# Patient Record
Sex: FEMALE | Race: WHITE | NOT HISPANIC OR LATINO | Employment: OTHER | URBAN - METROPOLITAN AREA
[De-identification: names, ages, dates, MRNs, and addresses within clinical notes are randomized per-mention and may not be internally consistent; named-entity substitution may affect disease eponyms.]

---

## 2017-06-08 ENCOUNTER — OFFICE VISIT (OUTPATIENT)
Dept: OPHTHALMOLOGY | Facility: CLINIC | Age: 62
End: 2017-06-08
Attending: OPHTHALMOLOGY
Payer: COMMERCIAL

## 2017-06-08 DIAGNOSIS — H16.9 KERATITIS: Primary | ICD-10-CM

## 2017-06-08 DIAGNOSIS — H02.59 FLOPPY EYELID SYNDROME: ICD-10-CM

## 2017-06-08 DIAGNOSIS — H04.129 DRY EYE: ICD-10-CM

## 2017-06-08 PROCEDURE — 99213 OFFICE O/P EST LOW 20 MIN: CPT | Mod: ZF

## 2017-06-08 ASSESSMENT — CONF VISUAL FIELD
OS_NORMAL: 1
OD_NORMAL: 1
METHOD: COUNTING FINGERS

## 2017-06-08 ASSESSMENT — VISUAL ACUITY
METHOD: SNELLEN - LINEAR
OD_PH_CC: 20/25
OS_CC: 20/25
OD_CC: 20/40
CORRECTION_TYPE: GLASSES

## 2017-06-08 ASSESSMENT — REFRACTION_WEARINGRX
OD_AXIS: 175
SPECS_TYPE: PAL
OS_ADD: +2.50
OS_AXIS: 046
OS_CYLINDER: +0.25
OD_CYLINDER: +0.25
OS_SPHERE: +6.25
OD_ADD: +2.50
OD_SPHERE: +6.50

## 2017-06-08 ASSESSMENT — TONOMETRY
IOP_METHOD: ICARE
OD_IOP_MMHG: 11
OS_IOP_MMHG: 14

## 2017-06-08 ASSESSMENT — EXTERNAL EXAM - RIGHT EYE: OD_EXAM: NO VESICULAR LESIONS

## 2017-06-08 ASSESSMENT — EXTERNAL EXAM - LEFT EYE: OS_EXAM: NO VESICULAR LESIONS

## 2017-06-08 NOTE — PROGRESS NOTES
Cc:   Chief Complaints and History of Present Illnesses   Patient presents with     Follow Up For     Keratitis Right eye     HPI: Heather Wilkinson is a 61 year old female who presents for 2 month followup of right eye keratitis. (see initial evaluation on 6/24/16).     Interval hx:   -vision stable  -dryness significantly improved.     POHx:  -contact lens wear   -? Herpetic keratitis OD (6/15/16)    Current Meds:   -acyclovir 400mg PO BID   -dexamethasone drops QOD OD  -erythromycin ointment HS OD  -PF AT PRN    Assessment & Plan   Heather Wilkinson is a 60 year old female with the following diagnoses:     1. Keratitis - Right Eye    2. Floppy eyelid syndrome         1. Keratitis - Right Eye  -likely herpetic keratitis previously  -likely worsened with other confounding issues:   -incomplete blink, floppy eyelids with exposure pattern OU, hx of cold sores with suspect viral keratitis recently and previously weekly CL use (>15 years)      Now resolved with Dry Eye Syndrome appearance and faint scar inferior    -has silicone plug RLL  -continue acyclovir 400mg PO BID for ppx, can consider trial off if patient desires  -dexamethasone 0.1% every other day   -continue PF AT QID and prn  -continue EES or Refresh PM at bedtime    2. Floppy eyelid syndrome  -as per exam with lids and corneal/conjunctival staining  -continue use of ointment at qHS with aggressive use of artificial tears through the day     RTC 6 months.     Kelvin Hannon MD  Ophthalmology resident, PGY-3        ~~~~~~~~~~~~~~~~~~~~~~~~~~~~~~~~~~~~~~~~~~~~~~~~~~~~~~~~~~~~~~~~    Complete documentation of historical and exam elements from today's encounter can be found in the full encounter summary report (not reduplicated in this progress note). I personally obtained the chief complaint(s) and history of present illness.  I confirmed and edited as necessary the review of systems, past medical/surgical history, family history, social history, and examination  findings as documented by others.  I examined the patient myself, and I personally reviewed the relevant tests, images, and reports as documented above. I formulated and edited as necessary the assessment and plan and discussed the findings and management plan with the patient and family.     Ld Burnett MD, MA  Director, Cornea & Anterior Segment  [facility] Department of Ophthalmology & Visual Neuroscience

## 2017-06-08 NOTE — NURSING NOTE
Chief Complaints and History of Present Illnesses   Patient presents with     Keratitis Follow Up     Keratitis      HPI    Affected eye(s):  Both   Symptoms:        Frequency:  Constant       Do you have eye pain now?:  No      Comments:  States va is the same since last visit.  But is noticing changes when it is dark  No red watery or dry  Gail Bell COT 1:29 PM June 8, 2017

## 2017-06-08 NOTE — MR AVS SNAPSHOT
After Visit Summary   6/8/2017    Heather Wilkinson    MRN: [id number]           Patient Information     Date Of Birth          1955        Visit Information        Provider Department      6/8/2017 1:15 PM Ld Burnett MD [facility]        Today's Diagnoses     Keratitis - Right Eye    -  1    Floppy eyelid syndrome - Both Eyes        Dry eye           Follow-ups after your visit        Follow-up notes from your care team     Return in about 6 months (around 12/8/2017) for general followup, cataract check.      Who to contact     Please call your clinic at [phone] to:    Ask questions about your health    Make or cancel appointments    Discuss your medicines    Learn about your test results    Speak to your doctor   If you have compliments or concerns about an experience at your clinic, or if you wish to file a complaint, please contact [facility] Patient Relations at [phone] or email us at [email or web address]         Additional Information About Your Visit        MyChart Information     Open[employer]St gives you secure access to your electronic health record. If you see a primary care provider, you can also send messages to your care team and make appointments. If you have questions, please call your primary care clinic.  If you do not have a primary care provider, please call [phone] and they will assist you.      [employer] is an electronic gateway that provides easy, online access to your medical records. With [employer], you can request a clinic appointment, read your test results, renew a prescription or communicate with your care team.     To access your existing account, please contact your [facility] or call [phone] for assistance.        Care EveryWhere ID     This is your Care EveryWhere ID. This could be used by other organizations to access your [place] medical records  [id number]         Blood  Pressure from Last 3 Encounters:   09/28/16 112/60   07/20/16 110/60   06/03/15 110/68    Weight from Last 3 Encounters:   09/28/16 70.3 kg (155 lb)   07/20/16 72.6 kg (160 lb)   06/03/15 71.2 kg (157 lb)              Today, you had the following     No orders found for display       Primary Care Provider Office Phone # Fax #    Shellie Montoya [phone]        [facility] [address] [place] [address] [address]  [facility] [address]        Thank you!     Thank you for choosing EYE CLINIC  for your care. Our goal is always to provide you with excellent care. Hearing back from our patients is one way we can continue to improve our services. Please take a few minutes to complete the written survey that you may receive in the mail after your visit with us. Thank you!             Your Updated Medication List - Protect others around you: Learn how to safely use, store and throw away your medicines at www.disposemymeds.org.          This list is accurate as of: 6/8/17 11:59 PM.  Always use your most recent med list.                   Brand Name Dispense Instructions for use    acyclovir 400 MG tablet    ZOVIRAX    60 tablet    Take 1 tablet (400 mg) by mouth 2 times daily       albuterol 108 (90 BASE) MCG/ACT Inhaler    PROAIR HFA/PROVENTIL HFA/VENTOLIN HFA     Inhale 2 puffs into the lungs every 6 hours.       ARTIFICIAL TEARS OP      Apply 1 drop to eye as needed Usually every 30 minutes.       budesonide-formoterol 160-4.5 MCG/ACT Inhaler    SYMBICORT     Inhale 2 puffs into the lungs 2 times daily       ciprofloxacin 500 MG tablet    CIPRO    14 tablet    Take 1 tablet (500 mg) by mouth 2 times daily       dexamethasone 0.1 % ophthalmic solution    DECADRON    5 mL    Place 1 drop into the right eye every other day       dexamethasone 0.1 % ophthalmic susp    DECADRON     Place 1 drop into the right eye 2 times daily       * erythromycin ophthalmic ointment    ROMYCIN     Place 1 Application into the right eye  At Bedtime       * erythromycin ophthalmic ointment    ROMYCIN    1 Tube    Place 0.25 inches into the right eye At Bedtime       phenazopyridine 100 MG tablet    PYRIDIUM    20 tablet    Take 2 tablets (200 mg) by mouth 3 times daily as needed       triamterene-hydrochlorothiazide 37.5-25 MG per capsule    DYAZIDE    180 capsule    Take 2 capsules by mouth daily       * Notice:  This list has 2 medication(s) that are the same as other medications prescribed for you. Read the directions carefully, and ask your doctor or other care provider to review them with you.

## 2017-07-12 DIAGNOSIS — R60.9 EDEMA, UNSPECIFIED TYPE: ICD-10-CM

## 2017-07-13 RX ORDER — TRIAMTERENE AND HYDROCHLOROTHIAZIDE 37.5; 25 MG/1; MG/1
CAPSULE ORAL
Qty: 180 CAPSULE | Refills: 0 | OUTPATIENT
Start: 2017-07-13

## 2017-07-13 NOTE — TELEPHONE ENCOUNTER
Pending Prescriptions:                       Disp   Refills    triamterene-hydrochlorothiazide (DYAZIDE) *180 ca*0        Sig: TAKE 2 CAPSULES BY MOUTH DAILY          Last Written Prescription Date: 7/12/17  Last Fill Quantity: 180, # refills: 0  Last Office Visit with G, [facility] or [facility] prescribing provider: 9/28/16 Problem Visit, 7/20/16 Annual    Next 5 appointments (look out 90 days)     Aug 03, 2017 11:00 AM CDT   PHYSICAL with James Hassan MD   [facility]    [address] [place] [address]   [phone]                   Potassium   Date Value Ref Range Status   05/30/2014 3.6 mmol/L Final     Creatinine   Date Value Ref Range Status   05/30/2014 0.74 mg/dL Final     BP Readings from Last 3 Encounters:   09/28/16 112/60   07/20/16 110/60   06/03/15 110/68

## 2017-10-07 DIAGNOSIS — R60.9 EDEMA, UNSPECIFIED TYPE: ICD-10-CM

## 2017-10-10 RX ORDER — TRIAMTERENE AND HYDROCHLOROTHIAZIDE 37.5; 25 MG/1; MG/1
CAPSULE ORAL
Qty: 180 CAPSULE | Refills: 0 | Status: SHIPPED | OUTPATIENT
Start: 2017-10-10 | End: 2017-10-19

## 2017-10-10 NOTE — TELEPHONE ENCOUNTER
DYAZIDE 37.5-25      Last Written Prescription Date:  7/12/17  Last Fill Quantity: 180,   # refills: 0  Last Office Visit with G primary care provider:  7/20/16-annual  Future Office visit: 10/19/17    Pt due for annual, appt scheduled,3 month supply sent for insurance purposes.

## 2017-10-12 ENCOUNTER — OFFICE VISIT (OUTPATIENT)
Dept: OPHTHALMOLOGY | Facility: CLINIC | Age: 62
End: 2017-10-12
Attending: OPHTHALMOLOGY
Payer: COMMERCIAL

## 2017-10-12 DIAGNOSIS — H02.59 FLOPPY EYELID SYNDROME: ICD-10-CM

## 2017-10-12 DIAGNOSIS — H16.9 KERATITIS: Primary | ICD-10-CM

## 2017-10-12 DIAGNOSIS — H04.129 DRY EYE: ICD-10-CM

## 2017-10-12 PROCEDURE — 99213 OFFICE O/P EST LOW 20 MIN: CPT | Mod: ZF

## 2017-10-12 RX ORDER — ACYCLOVIR 400 MG/1
400 TABLET ORAL 2 TIMES DAILY
Qty: 60 TABLET | Refills: 11 | Status: SHIPPED | OUTPATIENT
Start: 2017-10-12 | End: 2018-08-09

## 2017-10-12 ASSESSMENT — VISUAL ACUITY
OS_BAT_LOW: 20/30
OD_BAT_LOW: 20/20
OD_CC: 20/20
OD_BAT_MED: 20/25
METHOD: SNELLEN - LINEAR
OS_CC: 20/30
OD_BAT_HIGH: 20/25
OS_BAT_HIGH: 20/40
OS_CC+: -2
CORRECTION_TYPE: GLASSES
OS_BAT_MED: 20/30

## 2017-10-12 ASSESSMENT — REFRACTION_WEARINGRX
OD_SPHERE: +6.50
OS_CYLINDER: +0.25
SPECS_TYPE: PAL
OD_ADD: +2.50
OS_ADD: +2.50
OD_AXIS: 175
OS_AXIS: 046
OD_CYLINDER: +0.25
OS_SPHERE: +6.25

## 2017-10-12 ASSESSMENT — CONF VISUAL FIELD
OD_NORMAL: 1
METHOD: COUNTING FINGERS
OS_NORMAL: 1

## 2017-10-12 ASSESSMENT — EXTERNAL EXAM - LEFT EYE: OS_EXAM: NO VESICULAR LESIONS

## 2017-10-12 ASSESSMENT — EXTERNAL EXAM - RIGHT EYE: OD_EXAM: NO VESICULAR LESIONS

## 2017-10-12 ASSESSMENT — TONOMETRY
OS_IOP_MMHG: 13
OD_IOP_MMHG: 13
IOP_METHOD: TONOPEN

## 2017-10-12 NOTE — PROGRESS NOTES
Cc:   Chief Complaints and History of Present Illnesses   Patient presents with     Follow Up For     Keratitis Right eye     HPI: Heather Wilkinson is a 61 year old female who presents for 6 month followup of right eye keratitis. (see initial evaluation on 6/24/16).     Interval hx:   -vision stable  -reports difficulty with night driving, glare bothers her more   -denies haloes  -denies new flashes/floaters     POHx:  -contact lens wear   -Herpetic keratitis OD (6/15/16)    Current Meds:   -acyclovir 400mg PO BID   -dexamethasone drops every other day OD  -erythromycin ointment HS OD  -PF AT PRN    Assessment & Plan   Heather Wilkinson is a 60 year old female with the following diagnoses:     1. Keratitis - Right Eye    2. Floppy eyelid syndrome         1. Keratitis - Right Eye  -likely herpetic keratitis previously  -likely worsened with other confounding issues:   -incomplete blink, floppy eyelids with exposure pattern OU, hx of cold sores with suspect viral keratitis recently and previously weekly CL use (>15 years)      Now resolved with Dry Eye Syndrome appearance and faint scar inferior    -has silicone plug RLL  -continue acyclovir 400mg PO BID for ppx, can consider trial off if patient desires  -dexamethasone 0.1% every other day   -continue PF AT QID and prn  -continue EES or Refresh PM at bedtime    2. Floppy eyelid syndrome  -as per exam with lids and corneal/conjunctival staining  -continue use of ointment at qHS with aggressive use of artificial tears through the day     RTC 6 months.     Ld Boudreaux MD  Ophthalmology, PGY-3      ~~~~~~~~~~~~~~~~~~~~~~~~~~~~~~~~~~~~~~~~~~~~~~~~~~~~~~~~~~~~~~~~    Complete documentation of historical and exam elements from today's encounter can be found in the full encounter summary report (not reduplicated in this progress note). I personally obtained the chief complaint(s) and history of present illness.  I confirmed and edited as necessary the review of systems,  past medical/surgical history, family history, social history, and examination findings as documented by others.  I examined the patient myself, and I personally reviewed the relevant tests, images, and reports as documented above. I formulated and edited as necessary the assessment and plan and discussed the findings and management plan with the patient and family.     Ld Burnett MD, MA  Director, Cornea & Anterior Segment  [facility] Department of Ophthalmology & Visual Neuroscience

## 2017-10-12 NOTE — MR AVS SNAPSHOT
"              After Visit Summary   10/12/2017    Heather Wilkinson    MRN: [id number]           Patient Information     Date Of Birth          1955        Visit Information        Provider Department      10/12/2017 7:45 AM Ld Burnett MD [facility]        Today's Diagnoses     Keratitis - Right Eye    -  1    Floppy eyelid syndrome - Both Eyes        Dry eye           Follow-ups after your visit        Follow-up notes from your care team     Return in about 6 months (around 4/12/2018) for Follow Up.      Your next 10 appointments already scheduled     Oct 19, 2017 10:15 AM CDT   MA SCREENING DIGITAL BILATERAL with WEMA1   [facility] ([facility])    [address], [address]  [facility] [address] [phone]           Do not use any powder, lotion or deodorant under your arms or on your breast. If you do, we will ask you to remove it before your exam.  Wear comfortable, two-piece clothing.  If you have any allergies, tell your care team.  Bring any previous mammograms from other facilities or have them mailed to the breast center. Three-dimensional (3D) mammograms are available at [place] locations in [facility], [place], [place], [facility], [place], [place], and [place]. [facility] locations include [facility] in [place]. Benefits of 3D mammograms include: - Improved rate of cancer detection - Decreases your chance of having to go back for more tests, which means fewer: - \"False-positive\" results (This means that there is an abnormal area but it isn't cancer.) - Invasive testing procedures, such as a biopsy or surgery - Can provide clearer images of the breast if you have dense breast tissue. 3D mammography is an optional exam that anyone can have with a 2D mammogram. It doesn't replace or take the place of a 2D mammogram. 2D mammograms remain an effective screening test for all women.  Not all " insurance companies cover the cost of a 3D mammogram. Check with your insurance.            Oct 19, 2017 10:30 AM CDT   PHYSICAL with James Hassan MD   [facility] ([facility])    [address] [place] [id number] [phone]              Who to contact     Please call your clinic at [phone] to:    Ask questions about your health    Make or cancel appointments    Discuss your medicines    Learn about your test results    Speak to your doctor   If you have compliments or concerns about an experience at your clinic, or if you wish to file a complaint, please contact [facility] Patient Relations at [phone] or email us at [email or web address]         Additional Information About Your Visit        W[employer] Information     [email or web address] gives you secure access to your electronic health record. If you see a primary care provider, you can also send messages to your care team and make appointments. If you have questions, please call your primary care clinic.  If you do not have a primary care provider, please call [phone] and they will assist you.      [email or web address] is an electronic gateway that provides easy, online access to your medical records. With [email or web address], you can request a clinic appointment, read your test results, renew a prescription or communicate with your care team.     To access your existing account, please contact your [facility] or call [phone] for assistance.        Care EveryWhere ID     This is your Care EveryWhere ID. This could be used by other organizations to access your [place] medical records  [id number]         Blood Pressure from Last 3 Encounters:   09/28/16 112/60   07/20/16 110/60   06/03/15 110/68    Weight from Last 3 Encounters:   09/28/16 70.3 kg (155 lb)   07/20/16 72.6 kg (160 lb)   06/03/15 71.2 kg (157 lb)              Today, you had  the following     No orders found for display         Where to get your medicines      These medications were sent to [facility] - [place], [place] - [address] [facility] [address] [facility] [address] [facility] [address]  [address], [facility] [address]     Phone:  [phone]     acyclovir 400 MG tablet          Primary Care Provider Office Phone # Fax #    Shellie Montoya [phone]        [facility] [address] [place], [place] [address]  [facility] [address]        Equal Access to Services     [facility]: Hadii aad ku hadasho Soomaali, waaxda luqadaha, qaybta kaalmada adeegyada, waxay margiin hayaan tray miller . So [facility] [phone].    ATENCIÓN: Si habla español, tiene a marques disposición servicios gratuitos de asistencia lingüística. [facility] [phone].    We comply with applicable federal civil rights laws and [place] laws. We do not discriminate on the basis of race, color, national origin, age, disability, sex, sexual orientation, or gender identity.            Thank you!     Thank you for choosing [facility]  for your care. Our goal is always to provide you with excellent care. Hearing back from our patients is one way we can continue to improve our services. Please take a few minutes to complete the written survey that you may receive in the mail after your visit with us. Thank you!             Your Updated Medication List - Protect others around you: Learn how to safely use, store and throw away your medicines at www.disposemymeds.org.          This list is accurate as of: 10/12/17  8:43 AM.  Always use your most recent med list.                   Brand Name Dispense Instructions for use Diagnosis    acyclovir 400 MG tablet    ZOVIRAX    60 tablet    Take 1 tablet (400 mg) by mouth 2 times daily    Keratitis       albuterol 108 (90 BASE) MCG/ACT Inhaler    PROAIR HFA/PROVENTIL HFA/VENTOLIN HFA     Inhale 2 puffs into the lungs every 6 hours.        ARTIFICIAL TEARS OP      Apply 1  drop to eye as needed Usually every 30 minutes.        budesonide-formoterol 160-4.5 MCG/ACT Inhaler    SYMBICORT     Inhale 2 puffs into the lungs 2 times daily        ciprofloxacin 500 MG tablet    CIPRO    14 tablet    Take 1 tablet (500 mg) by mouth 2 times daily    Dysuria       dexamethasone 0.1 % ophthalmic solution    DECADRON    5 mL    Place 1 drop into the right eye every other day    Keratitis       dexamethasone 0.1 % ophthalmic susp    DECADRON     Place 1 drop into the right eye 2 times daily        * erythromycin ophthalmic ointment    ROMYCIN     Place 1 Application into the right eye At Bedtime        * erythromycin ophthalmic ointment    ROMYCIN    1 Tube    Place 0.25 inches into the right eye At Bedtime    Keratitis       phenazopyridine 100 MG tablet    PYRIDIUM    20 tablet    Take 2 tablets (200 mg) by mouth 3 times daily as needed    Dysuria       * triamterene-hydrochlorothiazide 37.5-25 MG per capsule    DYAZIDE    180 capsule    TAKE 2 CAPSULES BY MOUTH DAILY    Edema, unspecified type       * triamterene-hydrochlorothiazide 37.5-25 MG per capsule    DYAZIDE    180 capsule    TAKE 2 CAPSULES BY MOUTH EVERY DAY    Edema, unspecified type       * Notice:  This list has 4 medication(s) that are the same as other medications prescribed for you. Read the directions carefully, and ask your doctor or other care provider to review them with you.

## 2017-10-12 NOTE — NURSING NOTE
Chief Complaints and History of Present Illnesses   Patient presents with     Follow Up For     Keratitis      HPI    Affected eye(s):  Both   Symptoms:        Frequency:  Constant       Do you have eye pain now?:  No      Comments:  States va is the same since last visit  Night va has gotten worse.  No red watery or dry  Gail Bell COT 7:41 AM October 12, 2017

## 2017-10-19 ENCOUNTER — OFFICE VISIT (OUTPATIENT)
Dept: OBGYN | Facility: CLINIC | Age: 62
End: 2017-10-19
Payer: COMMERCIAL

## 2017-10-19 ENCOUNTER — RADIANT APPOINTMENT (OUTPATIENT)
Dept: MAMMOGRAPHY | Facility: CLINIC | Age: 62
End: 2017-10-19
Payer: COMMERCIAL

## 2017-10-19 VITALS
BODY MASS INDEX: 25.39 KG/M2 | DIASTOLIC BLOOD PRESSURE: 74 MMHG | SYSTOLIC BLOOD PRESSURE: 122 MMHG | WEIGHT: 158 LBS | HEIGHT: 66 IN

## 2017-10-19 DIAGNOSIS — R60.9 EDEMA, UNSPECIFIED TYPE: ICD-10-CM

## 2017-10-19 DIAGNOSIS — Z01.419 ENCOUNTER FOR GYNECOLOGICAL EXAMINATION WITHOUT ABNORMAL FINDING: Primary | ICD-10-CM

## 2017-10-19 DIAGNOSIS — Z12.31 VISIT FOR SCREENING MAMMOGRAM: ICD-10-CM

## 2017-10-19 PROCEDURE — 99396 PREV VISIT EST AGE 40-64: CPT | Performed by: OBSTETRICS & GYNECOLOGY

## 2017-10-19 PROCEDURE — G0145 SCR C/V CYTO,THINLAYER,RESCR: HCPCS | Performed by: OBSTETRICS & GYNECOLOGY

## 2017-10-19 PROCEDURE — G0202 SCR MAMMO BI INCL CAD: HCPCS | Mod: TC

## 2017-10-19 PROCEDURE — 77063 BREAST TOMOSYNTHESIS BI: CPT | Mod: TC

## 2017-10-19 PROCEDURE — 87624 HPV HI-RISK TYP POOLED RSLT: CPT | Performed by: OBSTETRICS & GYNECOLOGY

## 2017-10-19 RX ORDER — TRIAMTERENE AND HYDROCHLOROTHIAZIDE 37.5; 25 MG/1; MG/1
CAPSULE ORAL
Qty: 180 CAPSULE | Refills: 3 | Status: SHIPPED | OUTPATIENT
Start: 2017-10-19 | End: 2019-01-06

## 2017-10-19 ASSESSMENT — ANXIETY QUESTIONNAIRES
7. FEELING AFRAID AS IF SOMETHING AWFUL MIGHT HAPPEN: NOT AT ALL
2. NOT BEING ABLE TO STOP OR CONTROL WORRYING: NOT AT ALL
6. BECOMING EASILY ANNOYED OR IRRITABLE: NOT AT ALL
GAD7 TOTAL SCORE: 0
5. BEING SO RESTLESS THAT IT IS HARD TO SIT STILL: NOT AT ALL
IF YOU CHECKED OFF ANY PROBLEMS ON THIS QUESTIONNAIRE, HOW DIFFICULT HAVE THESE PROBLEMS MADE IT FOR YOU TO DO YOUR WORK, TAKE CARE OF THINGS AT HOME, OR GET ALONG WITH OTHER PEOPLE: NOT DIFFICULT AT ALL
3. WORRYING TOO MUCH ABOUT DIFFERENT THINGS: NOT AT ALL
1. FEELING NERVOUS, ANXIOUS, OR ON EDGE: NOT AT ALL

## 2017-10-19 ASSESSMENT — PATIENT HEALTH QUESTIONNAIRE - PHQ9
5. POOR APPETITE OR OVEREATING: NOT AT ALL
SUM OF ALL RESPONSES TO PHQ QUESTIONS 1-9: 0

## 2017-10-19 NOTE — PROGRESS NOTES
Heather is a 61 year old No obstetric history on file. female who presents for annual exam.     Besides routine health maintenance, she has no other health concerns today .    HPI:  The patient's PCP is Shellie Montoya MD.    Gynecologic exam.  She has no complaints.  She needs a refill on her triamterene.  She is up-to-date on her mammogram and colonoscopy.      GYNECOLOGIC HISTORY:    No LMP recorded. Patient has had a hysterectomy.  Her current contraception method is: menopause.  She  reports that she has been smoking.  She has never used smokeless tobacco.      Patient is sexually active.  STD testing offered?  Declined  Last PHQ-9 score on record =   PHQ-9 SCORE 10/19/2017   Total Score 0     Last GAD7 score on record =   ERICA-7 SCORE 10/19/2017   Total Score 0     Alcohol Score = 3    HEALTH MAINTENANCE:  Cholesterol:   Cholesterol   Date Value Ref Range Status   2016 223 (H) <200 mg/dL Final     Comment:     Desirable:       <200 mg/dl   2014 219 mg/dL Final    16   Total= 223, Triglycerides=134, HDL=60, UKV=016    Last Mammo: one year ago, Result: normal, Next Mammo: today   Pap:   Lab Results   Component Value Date    PAP NIL 2016    PAP NIL 2015 WNL   Colonoscopy:  2 years ago, Result: normal, Next Colonoscopy: 3 years.  Dexa:  2014    Health maintenance updated:  yes    HISTORY:  Obstetric History       T4      L4     SAB0   TAB0   Ectopic0   Multiple0   Live Births4       # Outcome Date GA Lbr Giuseppe/2nd Weight Sex Delivery Anes PTL Lv   4 Term            3 Term            2 Term            1 Term                   There is no problem list on file for this patient.    Past Surgical History:   Procedure Laterality Date     CYSTOSCOPY  2012    Procedure:CYSTOSCOPY; Surgeon:RADHA COMER; Location:[facility]     GYN SURGERY      csection, infertility, hyst     HYSTERECTOMY  1992     SLING TRANSVAGINAL  2012    Procedure:SLING  TRANSVAGINAL; mini arc sling  and cystoscopy; Surgeon:RADHA COMER; [facility]      Social History   Substance Use Topics     Smoking status: Current Some Day Smoker     Smokeless tobacco: Never Used     Alcohol use Yes         Negative family history of: Glaucoma, Macular Degeneration            Current Outpatient Prescriptions   Medication Sig     triamterene-hydrochlorothiazide (DYAZIDE) 37.5-25 MG per capsule TAKE 2 CAPSULES BY MOUTH EVERY DAY     acyclovir (ZOVIRAX) 400 MG tablet Take 1 tablet (400 mg) by mouth 2 times daily     triamterene-hydrochlorothiazide (DYAZIDE) 37.5-25 MG per capsule TAKE 2 CAPSULES BY MOUTH DAILY     ciprofloxacin (CIPRO) 500 MG tablet Take 1 tablet (500 mg) by mouth 2 times daily     dexamethasone (DECADRON) 0.1 % ophthalmic susp Place 1 drop into the right eye 2 times daily     erythromycin (ROMYCIN) ophthalmic ointment Place 1 Application into the right eye At Bedtime     dexamethasone (DECADRON) 0.1 % ophthalmic solution Place 1 drop into the right eye every other day     erythromycin (ROMYCIN) ophthalmic ointment Place 0.25 inches into the right eye At Bedtime     phenazopyridine (PYRIDIUM) 100 MG tablet Take 2 tablets (200 mg) by mouth 3 times daily as needed     Hypromellose (ARTIFICIAL TEARS OP) Apply 1 drop to eye as needed Usually every 30 minutes.     budesonide-formoterol (SYMBICORT) 160-4.5 MCG/ACT inhaler Inhale 2 puffs into the lungs 2 times daily     albuterol (PROVENTIL HFA: VENTOLIN HFA) 108 (90 BASE) MCG/ACT inhaler Inhale 2 puffs into the lungs every 6 hours.     [DISCONTINUED] triamterene-hydrochlorothiazide (DYAZIDE) 37.5-25 MG per capsule TAKE 2 CAPSULES BY MOUTH EVERY DAY     No current facility-administered medications for this visit.      Allergies   Allergen Reactions     Latex Anaphylaxis     Penicillins      Childhood rash     Sulfa Drugs        Past medical, surgical, social and family histories were reviewed and updated in [facility].    ROS:  "  Musculoskeletal: Joint Pain    EXAM:  /74  Ht 5' 5.75\" (1.67 m)  Wt 158 lb (71.7 kg)  Breastfeeding? No  BMI 25.7 kg/m2   BMI: Body mass index is 25.7 kg/(m^2).    PHYSICAL EXAM:  Constitutional:  Appearance: Well nourished, well developed, alert, in no acute distress  Neck:  Lymph Nodes:  No lymphadenopathy present    Thyroid:  Gland size normal, nontender, no nodules or masses present  on palpation  Chest:  Respiratory Effort:  Breathing unlabored  Cardiovascular:    Heart: Auscultation:  Regular rate, normal rhythm, no murmurs present  Breasts: Inspection of Breasts:  No lymphadenopathy present., Palpation of Breasts and Axillae:  No masses present on palpation, no breast tenderness., Axillary Lymph Nodes:  No lymphadenopathy present. and No nodularity, asymmetry or nipple discharge bilaterally.  Gastrointestinal:   Abdominal Examination:  Abdomen nontender to palpation, tone normal without rigidity or guarding, no masses present, umbilicus without lesions   Liver and Spleen:  No hepatomegaly present, liver nontender to palpation    Hernias:  No hernias present  Lymphatic: Lymph Nodes:  No other lymphadenopathy present  Skin:  General Inspection:  No rashes present, no lesions present, no areas of  discoloration    Genitalia and Groin:  No rashes present, no lesions present, no areas of  discoloration, no masses present  Neurologic/Psychiatric:    Mental Status:  Oriented X3     Pelvic Exam:  External Genitalia:     Normal appearance for age, no discharge present, no tenderness present, no inflammatory lesions present, color normal  Vagina:     Normal vaginal vault without central or paravaginal defects, no discharge present, no inflammatory lesions present, no masses present  Bladder:     Nontender to palpation  Urethra:   Urethral Body:  Urethra palpation normal, urethra structural support normal   Urethral Meatus:  No erythema or lesions present  Cervix:     Surgically absent  Uterus:  "    Surgically absent  Adnexa:     Surgically absent  Perineum:     Perineum within normal limits, no evidence of trauma, no rashes or skin lesions present  Anus:     Anus within normal limits, no hemorrhoids present  Inguinal Lymph Nodes:     No lymphadenopathy present      COUNSELING:   Reviewed preventive health counseling, as reflected in patient instructions       Regular exercise       Healthy diet/nutrition    BMI: Body mass index is 25.7 kg/(m^2).      ASSESSMENT:  61 year old female with satisfactory annual exam.    ICD-10-CM    1. Encounter for gynecological examination without abnormal finding Z01.419 Pap imaged thin layer screen with HPV - recommended age 30 - 65     HPV High Risk Types DNA Cervical   2. Edema, unspecified type R60.9 triamterene-hydrochlorothiazide (DYAZIDE) 37.5-25 MG per capsule       PLAN:  Return to clinic as needed or one year.    James Hassan MD

## 2017-10-19 NOTE — MR AVS SNAPSHOT
"              After Visit Summary   10/19/2017    Heather Wilkinson    MRN: [id number]           Patient Information     Date Of Birth          1955        Visit Information        Provider Department      10/19/2017 10:30 AM James Hassan MD [facility]        Today's Diagnoses     Encounter for gynecological examination without abnormal finding    -  1    Edema, unspecified type           Follow-ups after your visit        Who to contact     If you have questions or need follow up information about today's clinic visit or your schedule please contact [facility] directly at [phone].  Normal or non-critical lab and imaging results will be communicated to you by MyChart, letter or phone within 4 business days after the clinic has received the results. If you do not hear from us within 7 days, please contact the clinic through Preisbockhart or phone. If you have a critical or abnormal lab result, we will notify you by phone as soon as possible.  Submit refill requests through GPal or call your pharmacy and they will forward the refill request to us. Please allow 3 business days for your refill to be completed.          Additional Information About Your Visit        MyChart Information     GPal gives you secure access to your electronic health record. If you see a primary care provider, you can also send messages to your care team and make appointments. If you have questions, please call your primary care clinic.  If you do not have a primary care provider, please call [phone] and they will assist you.        Care EveryWhere ID     This is your Care EveryWhere ID. This could be used by other organizations to access your [place] medical records  [id number]        Your Vitals Were     Height Breastfeeding? BMI (Body Mass Index)             5' 5.75\" (1.67 m) No 25.7 kg/m2          Blood Pressure from Last 3 Encounters:   10/19/17 122/74   09/28/16 112/60 "   07/20/16 110/60    Weight from Last 3 Encounters:   10/19/17 158 lb (71.7 kg)   09/28/16 155 lb (70.3 kg)   07/20/16 160 lb (72.6 kg)              We Performed the Following     HPV High Risk Types DNA Cervical     Pap imaged thin layer screen with HPV - recommended age 30 - 65          Today's Medication Changes          These changes are accurate as of: 10/19/17 10:49 AM.  If you have any questions, ask your nurse or doctor.               These medicines have changed or have updated prescriptions.        Dose/Directions    * triamterene-hydrochlorothiazide 37.5-25 MG per capsule   Commonly known as:  DYAZIDE   This may have changed:  Another medication with the same name was changed. Make sure you understand how and when to take each.   Used for:  Edema, unspecified type   Changed by:  James Hassan MD        TAKE 2 CAPSULES BY MOUTH DAILY   Quantity:  180 capsule   Refills:  0       * triamterene-hydrochlorothiazide 37.5-25 MG per capsule   Commonly known as:  DYAZIDE   This may have changed:  See the new instructions.   Used for:  Edema, unspecified type   Changed by:  James Hassan MD        TAKE 2 CAPSULES BY MOUTH EVERY DAY   Quantity:  180 capsule   Refills:  3       * Notice:  This list has 2 medication(s) that are the same as other medications prescribed for you. Read the directions carefully, and ask your doctor or other care provider to review them with you.         Where to get your medicines      These medications were sent to [facility] [address] [place] [address] [address] [facility] AT [facility] [address]  [address] , [facility] [address]     Phone:  [phone]     triamterene-hydrochlorothiazide 37.5-25 MG per capsule                Primary Care Provider Office Phone # Fax #    Shellie Montoya [phone]        [facility] [address] [place] [address] [address]  [facility] [address]        Equal Access to Services     FELA BOND AH: Glenda selby  bandar Kraft, walaureda lualishaadaha, qaybta kapat tijerina, tamia potts alejandrabrock huytrena lanickcandy mike. So [facility] [phone].    ATENCIÓN: Si khloe allan, tiene a marques disposición servicios gratuitos de asistencia lingüística. Christa al [phone].    We comply with applicable federal civil rights laws and [place] laws. We do not discriminate on the basis of race, color, national origin, age, disability, sex, sexual orientation, or gender identity.            Thank you!     Thank you for choosing [facility]  for your care. Our goal is always to provide you with excellent care. Hearing back from our patients is one way we can continue to improve our services. Please take a few minutes to complete the written survey that you may receive in the mail after your visit with us. Thank you!             Your Updated Medication List - Protect others around you: Learn how to safely use, store and throw away your medicines at www.disposemymeds.org.          This list is accurate as of: 10/19/17 10:49 AM.  Always use your most recent med list.                   Brand Name Dispense Instructions for use Diagnosis    acyclovir 400 MG tablet    ZOVIRAX    60 tablet    Take 1 tablet (400 mg) by mouth 2 times daily    Keratitis       albuterol 108 (90 BASE) MCG/ACT Inhaler    PROAIR HFA/PROVENTIL HFA/VENTOLIN HFA     Inhale 2 puffs into the lungs every 6 hours.        ARTIFICIAL TEARS OP      Apply 1 drop to eye as needed Usually every 30 minutes.        budesonide-formoterol 160-4.5 MCG/ACT Inhaler    SYMBICORT     Inhale 2 puffs into the lungs 2 times daily        ciprofloxacin 500 MG tablet    CIPRO    14 tablet    Take 1 tablet (500 mg) by mouth 2 times daily    Dysuria       dexamethasone 0.1 % ophthalmic solution    DECADRON    5 mL    Place 1 drop into the right eye every other day    Keratitis       dexamethasone 0.1 % ophthalmic susp    DECADRON     Place 1 drop into the right eye 2 times daily         * erythromycin ophthalmic ointment    ROMYCIN     Place 1 Application into the right eye At Bedtime        * erythromycin ophthalmic ointment    ROMYCIN    1 Tube    Place 0.25 inches into the right eye At Bedtime    Keratitis       phenazopyridine 100 MG tablet    PYRIDIUM    20 tablet    Take 2 tablets (200 mg) by mouth 3 times daily as needed    Dysuria       * triamterene-hydrochlorothiazide 37.5-25 MG per capsule    DYAZIDE    180 capsule    TAKE 2 CAPSULES BY MOUTH DAILY    Edema, unspecified type       * triamterene-hydrochlorothiazide 37.5-25 MG per capsule    DYAZIDE    180 capsule    TAKE 2 CAPSULES BY MOUTH EVERY DAY    Edema, unspecified type       * Notice:  This list has 4 medication(s) that are the same as other medications prescribed for you. Read the directions carefully, and ask your doctor or other care provider to review them with you.

## 2017-10-20 ASSESSMENT — ANXIETY QUESTIONNAIRES: GAD7 TOTAL SCORE: 0

## 2017-10-23 LAB
COPATH REPORT: NORMAL
PAP: NORMAL

## 2017-10-25 LAB
FINAL DIAGNOSIS: NORMAL
HPV HR 12 DNA CVX QL NAA+PROBE: NEGATIVE
HPV16 DNA SPEC QL NAA+PROBE: NEGATIVE
HPV18 DNA SPEC QL NAA+PROBE: NEGATIVE
SPECIMEN DESCRIPTION: NORMAL

## 2018-01-04 DIAGNOSIS — H16.9 KERATITIS: ICD-10-CM

## 2018-01-05 RX ORDER — DEXAMETHASONE SODIUM PHOSPHATE 1 MG/ML
1 SOLUTION/ DROPS OPHTHALMIC EVERY OTHER DAY
Qty: 5 ML | Refills: 6 | Status: SHIPPED | OUTPATIENT
Start: 2018-01-05 | End: 2018-08-09

## 2018-01-05 RX ORDER — ERYTHROMYCIN 5 MG/G
0.25 OINTMENT OPHTHALMIC AT BEDTIME
Qty: 1 TUBE | Refills: 11 | Status: SHIPPED | OUTPATIENT
Start: 2018-01-05 | End: 2018-08-09

## 2018-01-05 NOTE — TELEPHONE ENCOUNTER
dexamethasone (DECADRON)   Last Written Prescription Date:  12/8/16  Last Fill Quantity: 5ml,   # refills: 6  Last Office Visit : 10/12/17  Future Office visit:  None     erythromycin (ROMYCIN) ophthalmic ointment   Last Written Prescription Date:  12/8/16  Last Fill Quantity: 1 tube,   # refills: 11      Office Visit     10/12/2017  [facility]    Page, Ld Bartlett MD   Ophthalmology    Keratitis - Right Eye +2 more   Dx    Follow Up For ; Referred by Shellie Montoya MD   Reason for visit    Progress Notes      Cc:        Chief Complaints and History of Present Illnesses   Patient presents with     Follow Up For       Keratitis Right eye      HPI: Heather Wilkinson is a 61 year old female who presents for 6 month followup of right eye keratitis. (see initial evaluation on 6/24/16).      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 Keratitis - Right Eye    2. Floppy eyelid syndrome           1. Keratitis - Right Eye  -likely herpetic keratitis previously  -likely worsened with other confounding issues:   -incomplete blink, floppy eyelids with exposure pattern OU, hx of cold sores with suspect viral keratitis recently and previously weekly CL use (>15 years)       Now resolved with Dry Eye Syndrome appearance and faint scar inferior     -has silicone plug RLL  -continue acyclovir 400mg PO BID for ppx, can consider trial off if patient desires  -dexamethasone 0.1% every other day   -continue PF AT QID and prn  -continue EES or Refresh PM at bedtime     2. Floppy eyelid syndrome  -as per exam with lids and corneal/conjunctival  staining  -continue use of ointment at qHS with aggressive use of artificial tears through the day      RTC 6 months.      Ld Boudreaux MD  Ophthalmology, PGY-3              Routing refill request to provider for review/approval because: protocol.

## 2018-08-09 ENCOUNTER — OFFICE VISIT (OUTPATIENT)
Dept: OPHTHALMOLOGY | Facility: CLINIC | Age: 63
End: 2018-08-09
Attending: OPHTHALMOLOGY
Payer: COMMERCIAL

## 2018-08-09 DIAGNOSIS — H02.59 FLOPPY EYELID SYNDROME: Primary | ICD-10-CM

## 2018-08-09 DIAGNOSIS — H04.129 DRY EYE: ICD-10-CM

## 2018-08-09 DIAGNOSIS — H16.9 KERATITIS: ICD-10-CM

## 2018-08-09 PROCEDURE — G0463 HOSPITAL OUTPT CLINIC VISIT: HCPCS | Mod: ZF

## 2018-08-09 RX ORDER — NYSTATIN 100000/ML
SUSPENSION, ORAL (FINAL DOSE FORM) ORAL
COMMUNITY
Start: 2018-06-25 | End: 2021-02-16

## 2018-08-09 RX ORDER — ERYTHROMYCIN 5 MG/G
0.25 OINTMENT OPHTHALMIC AT BEDTIME
Qty: 1 TUBE | Refills: 11 | Status: SHIPPED | OUTPATIENT
Start: 2018-08-09 | End: 2019-09-07

## 2018-08-09 RX ORDER — ASPIRIN 325 MG
325 TABLET ORAL
COMMUNITY
End: 2019-11-21

## 2018-08-09 RX ORDER — DEXAMETHASONE SODIUM PHOSPHATE 1 MG/ML
1 SOLUTION/ DROPS OPHTHALMIC EVERY OTHER DAY
Qty: 5 ML | Refills: 6 | Status: SHIPPED | OUTPATIENT
Start: 2018-08-09 | End: 2019-01-14

## 2018-08-09 RX ORDER — ACYCLOVIR 400 MG/1
400 TABLET ORAL 2 TIMES DAILY
Qty: 60 TABLET | Refills: 11 | Status: SHIPPED | OUTPATIENT
Start: 2018-08-09 | End: 2019-08-08

## 2018-08-09 RX ORDER — ACYCLOVIR 400 MG/1
TABLET ORAL
COMMUNITY
Start: 2016-12-08 | End: 2019-12-06

## 2018-08-09 RX ORDER — ALBUTEROL SULFATE 90 UG/1
2 AEROSOL, METERED RESPIRATORY (INHALATION)
COMMUNITY
Start: 2017-09-27 | End: 2018-10-24

## 2018-08-09 RX ORDER — DEXAMETHASONE SODIUM PHOSPHATE 1 MG/ML
1 SOLUTION/ DROPS OPHTHALMIC
COMMUNITY
End: 2019-11-21

## 2018-08-09 RX ORDER — METOPROLOL TARTRATE 25 MG/1
TABLET, FILM COATED ORAL
COMMUNITY
Start: 2018-08-02

## 2018-08-09 ASSESSMENT — VISUAL ACUITY
CORRECTION_TYPE: GLASSES
OS_CC: 20/20
METHOD: SNELLEN - LINEAR
OD_CC: 20/20

## 2018-08-09 ASSESSMENT — REFRACTION_WEARINGRX
OD_CYLINDER: +0.25
OS_ADD: +2.50
OD_SPHERE: +6.50
OD_AXIS: 175
OS_AXIS: 046
SPECS_TYPE: PAL
OD_ADD: +2.50
OS_SPHERE: +6.25
OS_CYLINDER: +0.25

## 2018-08-09 ASSESSMENT — CUP TO DISC RATIO
OD_RATIO: 0.3
OS_RATIO: 0.3

## 2018-08-09 ASSESSMENT — CONF VISUAL FIELD
OD_NORMAL: 1
OS_NORMAL: 1

## 2018-08-09 ASSESSMENT — TONOMETRY
OS_IOP_MMHG: 10
IOP_METHOD: ICARE
OD_IOP_MMHG: 10

## 2018-08-09 ASSESSMENT — EXTERNAL EXAM - LEFT EYE: OS_EXAM: NO VESICULAR LESIONS

## 2018-08-09 ASSESSMENT — EXTERNAL EXAM - RIGHT EYE: OD_EXAM: NO VESICULAR LESIONS

## 2018-08-09 NOTE — MR AVS SNAPSHOT
After Visit Summary   8/9/2018    Heather Wilkinson    MRN: [id number]           Patient Information     Date Of Birth          1955        Visit Information        Provider Department      8/9/2018 9:15 AM Ld Burnett MD [facility]        Today's Diagnoses     Floppy eyelid syndrome - Both Eyes    -  1    Keratitis - Right Eye        Dry eye           Follow-ups after your visit        Follow-up notes from your care team     Return in about 6 months (around 2/9/2019) for Follow Up.      Who to contact     Please call your clinic at [phone] to:    Ask questions about your health    Make or cancel appointments    Discuss your medicines    Learn about your test results    Speak to your doctor            Additional Information About Your Visit        Xoomsyshart Information     wildcraft gives you secure access to your electronic health record. If you see a primary care provider, you can also send messages to your care team and make appointments. If you have questions, please call your primary care clinic.  If you do not have a primary care provider, please call [phone] and they will assist you.      wildcraft is an electronic gateway that provides easy, online access to your medical records. With wildcraft, you can request a clinic appointment, read your test results, renew a prescription or communicate with your care team.     To access your existing account, please contact your [facility] or call [phone] for assistance.        Care EveryWhere ID     This is your Care EveryWhere ID. This could be used by other organizations to access your [place] medical records  [id number]         Blood Pressure from Last 3 Encounters:   10/19/17 122/74   09/28/16 112/60   07/20/16 110/60    Weight from Last 3 Encounters:   10/19/17 71.7 kg (158 lb)   09/28/16 70.3 kg (155 lb)   07/20/16 72.6 kg (160 lb)              Today, you had the following     No orders found  for display         Today's Medication Changes          These changes are accurate as of 8/9/18 11:59 PM.  If you have any questions, ask your nurse or doctor.               Stop taking these medicines if you haven't already. Please contact your care team if you have questions.     ciprofloxacin 500 MG tablet   Commonly known as:  CIPRO   Stopped by:  dL Burnett MD                Where to get your medicines      These medications were sent to [facility] [id number] [place] [address] [facility] OF [address]  [address] [facility], [facility] [address]     Phone:  [phone]     acyclovir 400 MG tablet    dexamethasone 0.1 % ophthalmic solution    erythromycin ophthalmic ointment                Primary Care Provider Office Phone # Fax #    Shellie Montoya [phone]        [facility] [address] [place], [place] [address]  [facility] [address]        Equal Access to Services     CHASITY BOND : Hadii aad ku hadasho Soomaali, waaxda luqadaha, qaybta kaalmada adeegyada, waxay idiin hayaan tray miller . So [facility] [phone].    ATENCIÓN: Si habla español, tiene a marques disposición servicios gratuitos de asistencia lingüística. Christa al [phone].    We comply with applicable federal civil rights laws and [place] laws. We do not discriminate on the basis of race, color, national origin, age, disability, sex, sexual orientation, or gender identity.            Thank you!     Thank you for choosing [facility]  for your care. Our goal is always to provide you with excellent care. Hearing back from our patients is one way we can continue to improve our services. Please take a few minutes to complete the written survey that you may receive in the mail after your visit with us. Thank you!             Your Updated Medication List - Protect others around you: Learn how to safely use, store and throw away your medicines at www.disposemymeds.org.          This list is accurate  as of 8/9/18 11:59 PM.  Always use your most recent med list.                   Brand Name Dispense Instructions for use Diagnosis    * acyclovir 400 MG tablet    ZOVIRAX     Take 1 tablet (400 mg) by mouth 2 times daily        * acyclovir 400 MG tablet    ZOVIRAX    60 tablet    Take 1 tablet (400 mg) by mouth 2 times daily    Keratitis       * albuterol 108 (90 Base) MCG/ACT inhaler    PROAIR HFA/PROVENTIL HFA/VENTOLIN HFA     Inhale 2 puffs into the lungs every 6 hours.        * albuterol 108 (90 Base) MCG/ACT inhaler    PROAIR HFA/PROVENTIL HFA/VENTOLIN HFA     Inhale 2 puffs into the lungs        ARTIFICIAL TEARS OP      Apply 1 drop to eye as needed Usually every 30 minutes.        aspirin 325 MG tablet      Take 325 mg by mouth        budesonide-formoterol 160-4.5 MCG/ACT Inhaler    SYMBICORT     Inhale 2 puffs into the lungs 2 times daily        * dexamethasone 0.1 % ophthalmic solution    DECADRON     1 drop        * dexamethasone 0.1 % ophthalmic solution    DECADRON    5 mL    Place 1 drop into the right eye every other day    Keratitis       dexamethasone 0.1 % ophthalmic susp    DECADRON     Place 1 drop into the right eye 2 times daily        * erythromycin ophthalmic ointment    ROMYCIN     Place 1 Application into the right eye At Bedtime        * erythromycin ophthalmic ointment    ROMYCIN    1 Tube    Place 0.25 inches into the right eye At Bedtime    Keratitis       metoprolol tartrate 25 MG tablet    LOPRESSOR          nystatin 666220 UNIT/ML suspension    MYCOSTATIN     TAKE 5MLS BY MOUTH FOUR TIMES DAILY        phenazopyridine 100 MG tablet    PYRIDIUM    20 tablet    Take 2 tablets (200 mg) by mouth 3 times daily as needed    Dysuria       ranitidine 150 MG tablet    ZANTAC     TAKE 1 TABLET(150 MG) BY MOUTH AT BEDTIME        * triamterene-hydrochlorothiazide 37.5-25 MG per capsule    DYAZIDE    180 capsule    TAKE 2 CAPSULES BY MOUTH DAILY    Edema, unspecified type       *  triamterene-hydrochlorothiazide 37.5-25 MG per capsule    DYAZIDE    180 capsule    TAKE 2 CAPSULES BY MOUTH EVERY DAY    Edema, unspecified type       * Notice:  This list has 10 medication(s) that are the same as other medications prescribed for you. Read the directions carefully, and ask your doctor or other care provider to review them with you.

## 2018-08-09 NOTE — PROGRESS NOTES
HPI: Heather Wilkinson is a 61 year old female who presents for 6 month followup of right eye keratitis. (see initial evaluation on 6/24/16).     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 Keratitis - Right Eye  -likely herpetic keratitis previously  -likely worsened with other confounding issues:  -incomplete blink, floppy eyelids with exposure pattern OU, hx of cold sores with suspect viral keratitis recently and previously weekly CL use (>15 years)      Now resolved with Dry Eye Syndrome appearance and faint scar inferior     -silicone plug in place RIGHT LOWER LID    -continue acyclovir 400 mg PO BID for ppx, can consider trial off if patient desires  -continue dexamethasone 0.1% every other day    -continue PF AT QID and as needed   -continue EES or Refresh PM at bedtime  -continue warm compress       2. Floppy eyelid syndrome  -as per exam with lids and corneal/conjunctival staining  -continue use of ointment at qHS with aggressive use of artificial tears through the day     return to clinic: 6 months       Minh Rock MD  Ophthalmology Resident, PGY-3  [facility]        ~~~~~~~~~~~~~~~~~~~~~~~~~~~~~~~~~~~~~~~~~~~~~~~~~~~~~~~~~~~~~~~~    Complete documentation of historical and exam elements from today's encounter can be found in the full encounter summary report (not reduplicated in this progress note). I personally obtained the chief complaint(s) and history of present illness.  I confirmed and edited as necessary the review of systems, past medical/surgical history, family history, social history, and examination findings as documented by others.  I examined the patient myself, and I personally reviewed  the relevant tests, images, and reports as documented above. I formulated and edited as necessary the assessment and plan and discussed the findings and management plan with the patient and family.     Ld Burnett MD, MA  Director, Cornea & Anterior Segment  [facility] Department of Ophthalmology & Visual Neuroscience

## 2018-08-09 NOTE — NURSING NOTE
Chief Complaints and History of Present Illnesses   Patient presents with     Follow Up For     Keratitis - Right Eye     HPI    Affected eye(s):  Both   Symptoms:        Duration:  6 months   Frequency:  Constant       Do you have eye pain now?:  No      Comments:  Pt. States that she is doing well.  No change in VA BE.  No c/o comfort BE.  Wendy Ortiz COT 9:23 AM August 9, 2018

## 2018-10-24 ENCOUNTER — OFFICE VISIT (OUTPATIENT)
Dept: OBGYN | Facility: CLINIC | Age: 63
End: 2018-10-24
Payer: COMMERCIAL

## 2018-10-24 ENCOUNTER — RADIANT APPOINTMENT (OUTPATIENT)
Dept: MAMMOGRAPHY | Facility: CLINIC | Age: 63
End: 2018-10-24
Payer: COMMERCIAL

## 2018-10-24 VITALS
BODY MASS INDEX: 26.26 KG/M2 | WEIGHT: 163.4 LBS | HEIGHT: 66 IN | HEART RATE: 80 BPM | SYSTOLIC BLOOD PRESSURE: 106 MMHG | DIASTOLIC BLOOD PRESSURE: 70 MMHG

## 2018-10-24 DIAGNOSIS — R30.0 DYSURIA: ICD-10-CM

## 2018-10-24 DIAGNOSIS — Z01.419 ENCOUNTER FOR GYNECOLOGICAL EXAMINATION WITHOUT ABNORMAL FINDING: Primary | ICD-10-CM

## 2018-10-24 DIAGNOSIS — R10.10 PAIN OF UPPER ABDOMEN: ICD-10-CM

## 2018-10-24 DIAGNOSIS — R60.9 EDEMA, UNSPECIFIED TYPE: ICD-10-CM

## 2018-10-24 DIAGNOSIS — Z12.31 VISIT FOR SCREENING MAMMOGRAM: ICD-10-CM

## 2018-10-24 PROCEDURE — 99396 PREV VISIT EST AGE 40-64: CPT | Performed by: OBSTETRICS & GYNECOLOGY

## 2018-10-24 PROCEDURE — 77067 SCR MAMMO BI INCL CAD: CPT | Mod: TC

## 2018-10-24 PROCEDURE — 77063 BREAST TOMOSYNTHESIS BI: CPT | Mod: TC

## 2018-10-24 RX ORDER — LORATADINE PSEUDOEPHEDRINE SULFATE 10; 240 MG/1; MG/1
TABLET, EXTENDED RELEASE ORAL
Refills: 0 | COMMUNITY
Start: 2018-10-12

## 2018-10-24 RX ORDER — PREDNISONE 20 MG/1
40 TABLET ORAL
COMMUNITY
Start: 2018-10-17 | End: 2019-11-21

## 2018-10-24 RX ORDER — CIPROFLOXACIN 500 MG/1
TABLET, FILM COATED ORAL
Refills: 0 | COMMUNITY
Start: 2017-12-14 | End: 2019-11-21

## 2018-10-24 RX ORDER — TRIAMTERENE AND HYDROCHLOROTHIAZIDE 37.5; 25 MG/1; MG/1
CAPSULE ORAL
Qty: 180 CAPSULE | Refills: 0 | Status: SHIPPED | OUTPATIENT
Start: 2018-10-24 | End: 2019-04-16

## 2018-10-24 RX ORDER — METOPROLOL SUCCINATE 25 MG/1
TABLET, EXTENDED RELEASE ORAL
Refills: 3 | COMMUNITY
Start: 2018-06-22 | End: 2020-03-11

## 2018-10-24 RX ORDER — BENZONATATE 200 MG/1
CAPSULE ORAL
Refills: 0 | COMMUNITY
Start: 2018-04-03 | End: 2019-11-21

## 2018-10-24 RX ORDER — PHENAZOPYRIDINE HYDROCHLORIDE 100 MG/1
200 TABLET, FILM COATED ORAL 3 TIMES DAILY PRN
Qty: 20 TABLET | Refills: 1 | Status: SHIPPED | OUTPATIENT
Start: 2018-10-24 | End: 2019-11-21

## 2018-10-24 ASSESSMENT — ANXIETY QUESTIONNAIRES
GAD7 TOTAL SCORE: 0
2. NOT BEING ABLE TO STOP OR CONTROL WORRYING: NOT AT ALL
IF YOU CHECKED OFF ANY PROBLEMS ON THIS QUESTIONNAIRE, HOW DIFFICULT HAVE THESE PROBLEMS MADE IT FOR YOU TO DO YOUR WORK, TAKE CARE OF THINGS AT HOME, OR GET ALONG WITH OTHER PEOPLE: NOT DIFFICULT AT ALL
6. BECOMING EASILY ANNOYED OR IRRITABLE: NOT AT ALL
5. BEING SO RESTLESS THAT IT IS HARD TO SIT STILL: NOT AT ALL
3. WORRYING TOO MUCH ABOUT DIFFERENT THINGS: NOT AT ALL
1. FEELING NERVOUS, ANXIOUS, OR ON EDGE: NOT AT ALL
7. FEELING AFRAID AS IF SOMETHING AWFUL MIGHT HAPPEN: NOT AT ALL

## 2018-10-24 ASSESSMENT — PATIENT HEALTH QUESTIONNAIRE - PHQ9
5. POOR APPETITE OR OVEREATING: NOT AT ALL
SUM OF ALL RESPONSES TO PHQ QUESTIONS 1-9: 0

## 2018-10-24 NOTE — MR AVS SNAPSHOT
After Visit Summary   10/24/2018    Heather Wilkinson    MRN: [id number]           Patient Information     Date Of Birth          1955        Visit Information        Provider Department      10/24/2018 2:45 PM James Hassan MD [facility]        Today's Diagnoses     Encounter for gynecological examination without abnormal finding    -  1    Edema, unspecified type        Dysuria        Pain of upper abdomen           Follow-ups after your visit        Future tests that were ordered for you today     Open Future Orders        Priority Expected Expires Ordered    CT Abdomen w Contrast Routine  10/25/2019 10/24/2018            Who to contact     If you have questions or need follow up information about today's clinic visit or your schedule please contact [facility] at [phone].  Normal or non-critical lab and imaging results will be communicated to you by MyChart, letter or phone within 4 business days after the clinic has received the results. If you do not hear from us within 7 days, please contact the clinic through doxohart or phone. If you have a critical or abnormal lab result, we will notify you by phone as soon as possible.  Submit refill requests through Live[employer] or call your pharmacy and they will forward the refill request to us. Please allow 3 business days for your refill to be completed.          Additional Information About Your Visit        MyChart Information     Live[employer] gives you secure access to your electronic health record. If you see a primary care provider, you can also send messages to your care team and make appointments. If you have questions, please call your primary care clinic.  If you do not have a primary care provider, please call [phone] and they will assist you.        Care EveryWhere ID     This is your Care EveryWhere ID. This could be used by other organizations to access your [facility]  "records  [id number]        Your Vitals Were     Pulse Height Breastfeeding? BMI (Body Mass Index)          80 5' 5.5\" (1.664 m) No 26.78 kg/m2         Blood Pressure from Last 3 Encounters:   10/24/18 106/70   10/19/17 122/74   09/28/16 112/60    Weight from Last 3 Encounters:   10/24/18 163 lb 6.4 oz (74.1 kg)   10/19/17 158 lb (71.7 kg)   09/28/16 155 lb (70.3 kg)                 Where to get your medicines      These medications were sent to [facility] [address] [place] [address] [address] [place] [facility] [address]  [address], [facility] [address]     Phone:  [phone]     phenazopyridine 100 MG tablet    triamterene-hydrochlorothiazide 37.5-25 MG per capsule          Primary Care Provider Office Phone # Fax #    Acosta Vila [phone]       [address] [address]        Equal Access to Services     FELA BOND AH: Hadii xuan selby hadasho Soomaali, waaxda luqadaha, qaybta kaalmada adeegyada, tamia miller . So [facility] [phone].    ATENCIÓN: Si habla español, tiene a marques disposición servicios gratuitos de asistencia lingüística. [facility] [phone].    We comply with applicable federal civil rights laws and [place] laws. We do not discriminate on the basis of race, color, national origin, age, disability, sex, sexual orientation, or gender identity.            Thank you!     Thank you for choosing [facility]  for your care. Our goal is always to provide you with excellent care. Hearing back from our patients is one way we can continue to improve our services. Please take a few minutes to complete the written survey that you may receive in the mail after your visit with us. Thank you!             Your Updated Medication List - Protect others around you: Learn how to safely use, store and throw away your medicines at www.[email or web address].org.          This list is accurate as of 10/24/18  " 3:06 PM.  Always use your most recent med list.                   Brand Name Dispense Instructions for use Diagnosis    * acyclovir 400 MG tablet    ZOVIRAX     Take 1 tablet (400 mg) by mouth 2 times daily        * acyclovir 400 MG tablet    ZOVIRAX    60 tablet    Take 1 tablet (400 mg) by mouth 2 times daily    Keratitis       albuterol 108 (90 Base) MCG/ACT inhaler    PROAIR HFA/PROVENTIL HFA/VENTOLIN HFA     Inhale 2 puffs into the lungs every 6 hours.        ARTIFICIAL TEARS OP      Apply 1 drop to eye as needed Usually every 30 minutes.        aspirin 325 MG tablet      Take 325 mg by mouth        benzonatate 200 MG capsule    TESSALON          budesonide-formoterol 160-4.5 MCG/ACT Inhaler    SYMBICORT     Inhale 2 puffs into the lungs 2 times daily        ciprofloxacin 500 MG tablet    CIPRO          CLARITIN-D 24 HOUR  MG per 24 hr tablet   Generic drug:  loratadine-pseudoePHEDrine      TK 1 T PO  D        * dexamethasone 0.1 % ophthalmic solution    DECADRON     1 drop        * dexamethasone 0.1 % ophthalmic solution    DECADRON    5 mL    Place 1 drop into the right eye every other day    Keratitis       dexamethasone 0.1 % ophthalmic susp    DECADRON     Place 1 drop into the right eye 2 times daily        DIPHENHYDRAMINE HCL PO      Take 1 tablet by mouth        * erythromycin ophthalmic ointment    ROMYCIN     Place 1 Application into the right eye At Bedtime        * erythromycin ophthalmic ointment    ROMYCIN    1 Tube    Place 0.25 inches into the right eye At Bedtime    Keratitis       metoprolol succinate 25 MG 24 hr tablet    TOPROL-XL     TK SS T PO ONCE D        metoprolol tartrate 25 MG tablet    LOPRESSOR          nystatin 303072 UNIT/ML suspension    MYCOSTATIN     TAKE 5MLS BY MOUTH FOUR TIMES DAILY        phenazopyridine 100 MG tablet    PYRIDIUM    20 tablet    Take 2 tablets (200 mg) by mouth 3 times daily as needed    Dysuria       predniSONE 20 MG tablet    DELTASONE     Take 40  mg by mouth        ranitidine 150 MG tablet    ZANTAC     TAKE 1 TABLET(150 MG) BY MOUTH AT BEDTIME        * triamterene-hydrochlorothiazide 37.5-25 MG per capsule    DYAZIDE    180 capsule    TAKE 2 CAPSULES BY MOUTH EVERY DAY    Edema, unspecified type       * triamterene-hydrochlorothiazide 37.5-25 MG per capsule    DYAZIDE    180 capsule    TAKE 2 CAPSULES BY MOUTH DAILY    Edema, unspecified type       VITAMIN D-3 PO      Take 1 tablet by mouth        * Notice:  This list has 8 medication(s) that are the same as other medications prescribed for you. Read the directions carefully, and ask your doctor or other care provider to review them with you.

## 2018-10-24 NOTE — PROGRESS NOTES
Heather is a 62 year old  female who presents for annual exam.     Besides routine health maintenance, she has no other health concerns today .    HPI:  The patient's PCP is  Acosta Vila.  Patient is seen for her annual exam.  She states that she has been gaining weight.  She is eating only thousand calories a day and is exercising regularly.  She does feel bloated.  Her upper abdomen is firmer.      GYNECOLOGIC HISTORY:    No LMP recorded. Patient has had a hysterectomy.  Her current contraception method is: hysterectomy.  She  reports that she has quit smoking. She has never used smokeless tobacco.    Patient is sexually active.  STD testing offered?  Declined  Last PHQ-9 score on record =   PHQ-9 SCORE 10/24/2018   Total Score 0     Last GAD7 score on record =   ERICA-7 SCORE 10/24/2018   Total Score 0     Alcohol Score = 3    HEALTH MAINTENANCE:  Cholesterol: 16   Total= 223, Triglycerides=134, HDL=60, UXY=325, TSH=1.60   Last Mammo: 10/19/17, Result: normal, Next Mammo: today   Pap: HPV: Negative  Lab Results   Component Value Date    PAP NIL 10/19/2017    PAP NIL 2016    PAP NIL 2015     Colonoscopy:  Per last year note 3 years ago, Result: normal, Next Colonoscopy: 2 years.  Dexa:  16    Health maintenance updated:  yes    HISTORY:  Obstetric History       T4      L4     SAB0   TAB0   Ectopic0   Multiple0   Live Births4       # Outcome Date GA Lbr Giuseppe/2nd Weight Sex Delivery Anes PTL Lv   4 Term            3 Term            2 Term            1 Term                   There is no problem list on file for this patient.    Past Surgical History:   Procedure Laterality Date     CYSTOSCOPY  2012    Procedure:CYSTOSCOPY; Surgeon:RADHA COMER; Location:[facility]     GYN SURGERY      csection, infertility, hyst     HYSTERECTOMY       HYSTERECTOMY, PAP NO LONGER INDICATED       SLING TRANSVAGINAL  2012    Procedure:SLING TRANSVAGINAL; mini arc sling  and  cystoscopy; Surgeon:RADHA COMER; Location:[facility]      Social History   Substance Use Topics     Smoking status: Former Smoker     Smokeless tobacco: Never Used     Alcohol use Yes         Negative family history of: Glaucoma, Macular Degeneration            Current Outpatient Prescriptions   Medication Sig     acyclovir (ZOVIRAX) 400 MG tablet Take 1 tablet (400 mg) by mouth 2 times daily     acyclovir (ZOVIRAX) 400 MG tablet Take 1 tablet (400 mg) by mouth 2 times daily     albuterol (PROVENTIL HFA: VENTOLIN HFA) 108 (90 BASE) MCG/ACT inhaler Inhale 2 puffs into the lungs every 6 hours.     aspirin 325 MG tablet Take 325 mg by mouth     benzonatate (TESSALON) 200 MG capsule      budesonide-formoterol (SYMBICORT) 160-4.5 MCG/ACT inhaler Inhale 2 puffs into the lungs 2 times daily     Cholecalciferol (VITAMIN D-3 PO) Take 1 tablet by mouth     ciprofloxacin (CIPRO) 500 MG tablet      CLARITIN-D 24 HOUR  MG per 24 hr tablet TK 1 T PO  D     dexamethasone (DECADRON) 0.1 % ophthalmic solution 1 drop     dexamethasone (DECADRON) 0.1 % ophthalmic solution Place 1 drop into the right eye every other day     dexamethasone (DECADRON) 0.1 % ophthalmic susp Place 1 drop into the right eye 2 times daily     DIPHENHYDRAMINE HCL PO Take 1 tablet by mouth     erythromycin (ROMYCIN) ophthalmic ointment Place 0.25 inches into the right eye At Bedtime     erythromycin (ROMYCIN) ophthalmic ointment Place 1 Application into the right eye At Bedtime     Hypromellose (ARTIFICIAL TEARS OP) Apply 1 drop to eye as needed Usually every 30 minutes.     metoprolol succinate (TOPROL-XL) 25 MG 24 hr tablet TK SS T PO ONCE D     metoprolol tartrate (LOPRESSOR) 25 MG tablet      nystatin (MYCOSTATIN) 151689 UNIT/ML suspension TAKE 5MLS BY MOUTH FOUR TIMES DAILY     phenazopyridine (PYRIDIUM) 100 MG tablet Take 2 tablets (200 mg) by mouth 3 times daily as needed     predniSONE (DELTASONE) 20 MG tablet Take 40 mg by mouth      "ranitidine (ZANTAC) 150 MG tablet TAKE 1 TABLET(150 MG) BY MOUTH AT BEDTIME     triamterene-hydrochlorothiazide (DYAZIDE) 37.5-25 MG per capsule TAKE 2 CAPSULES BY MOUTH DAILY     triamterene-hydrochlorothiazide (DYAZIDE) 37.5-25 MG per capsule TAKE 2 CAPSULES BY MOUTH EVERY DAY     [DISCONTINUED] triamterene-hydrochlorothiazide (DYAZIDE) 37.5-25 MG per capsule TAKE 2 CAPSULES BY MOUTH DAILY     No current facility-administered medications for this visit.      Allergies   Allergen Reactions     Latex Anaphylaxis     Xray Dye  [Contrast Dye] Hives     Pt states she had allergic reaction to xray day several years ago (hives) Pt has been successfully premedicated for contrast allergy without problem.      Latex      Sulfa Drugs      Penicillins Rash     Other reaction(s): Swelling, lips/tongue  Childhood rash       Past medical, surgical, social and family histories were reviewed and updated in EPIC.    ROS:   12 point review of systems negative other than symptoms noted below.  Cardiovascular: Chest Pain, Lightheadedness, Lower Extremity Swelling and Palpitations  Respiratory: Cough  Musculoskeletal: Joint Pain    EXAM:  /70 (BP Location: Right arm, Patient Position: Sitting, Cuff Size: Adult Regular)  Pulse 80  Ht 5' 5.5\" (1.664 m)  Wt 163 lb 6.4 oz (74.1 kg)  Breastfeeding? No  BMI 26.78 kg/m2   BMI: Body mass index is 26.78 kg/(m^2).    PHYSICAL EXAM:  Constitutional:  Appearance: Well nourished, well developed, alert, in no acute distress  Neck:  Lymph Nodes:  No lymphadenopathy present    Thyroid:  Gland size normal, nontender, no nodules or masses present  on palpation  Chest:  Respiratory Effort:  Breathing unlabored  Cardiovascular:    Heart: Auscultation:  Regular rate, normal rhythm, no murmurs present  Breasts: Inspection of Breasts:  No lymphadenopathy present., Palpation of Breasts and Axillae:  No masses present on palpation, no breast tenderness., Axillary Lymph Nodes:  No lymphadenopathy " present. and No nodularity, asymmetry or nipple discharge bilaterally.  Gastrointestinal:   Abdominal Examination:  Abdomen nontender to palpation, tone normal without rigidity or guarding, no masses present, umbilicus without lesions   Liver and Spleen:  No hepatomegaly present, liver nontender to palpation    Hernias:  No hernias present  Lymphatic: Lymph Nodes:  No other lymphadenopathy present  Skin:  General Inspection:  No rashes present, no lesions present, no areas of  discoloration    Genitalia and Groin:  No rashes present, no lesions present, no areas of  discoloration, no masses present  Neurologic/Psychiatric:    Mental Status:  Oriented X3     Pelvic Exam:  External Genitalia:     Normal appearance for age, no discharge present, no tenderness present, no inflammatory lesions present, color normal  Vagina:     Normal vaginal vault without central or paravaginal defects, no discharge present, no inflammatory lesions present, no masses present  Bladder:     Nontender to palpation  Urethra:   Urethral Body:  Urethra palpation normal, urethra structural support normal   Urethral Meatus:  No erythema or lesions present  Cervix:     Surgically absent  Uterus:     Surgically absent  Adnexa:     No adnexal tenderness present, no adnexal masses present  Perineum:     Perineum within normal limits, no evidence of trauma, no rashes or skin lesions present  Anus:     Anus within normal limits, no hemorrhoids present  Inguinal Lymph Nodes:     No lymphadenopathy present  Pubic Hair:     Normal pubic hair distribution for age  Genitalia and Groin:     No rashes present, no lesions present, no areas of discoloration, no masses present      COUNSELING:   Reviewed preventive health counseling, as reflected in patient instructions       Regular exercise       Healthy diet/nutrition    BMI: Body mass index is 26.78 kg/(m^2).      ASSESSMENT:  62 year old female with satisfactory annual exam.    ICD-10-CM    1. Encounter  for gynecological examination without abnormal finding Z01.419    2. Edema, unspecified type R60.9 triamterene-hydrochlorothiazide (DYAZIDE) 37.5-25 MG per capsule   3. Dysuria R30.0 phenazopyridine (PYRIDIUM) 100 MG tablet   4. Pain of upper abdomen R10.10 CT Abdomen w Contrast       PLAN:  Patient will be notified as to the CT findings.    James Hassan MD

## 2018-10-25 RX ORDER — CIPROFLOXACIN 500 MG/1
TABLET, FILM COATED ORAL
Qty: 14 TABLET | Refills: 0 | Status: SHIPPED | OUTPATIENT
Start: 2018-10-25 | End: 2019-07-14

## 2018-10-25 ASSESSMENT — ANXIETY QUESTIONNAIRES: GAD7 TOTAL SCORE: 0

## 2018-10-25 NOTE — TELEPHONE ENCOUNTER
Requested Prescriptions   Pending Prescriptions Disp Refills     ciprofloxacin (CIPRO) 500 MG tablet [Pharmacy Med Name: CIPROFLOXACIN 500MG TABLETS] 14 tablet 0     Sig: TAKE 1 TABLET(500 MG) BY MOUTH TWICE DAILY    There is no refill protocol information for this order        Last Written Prescription Date:  12/14/17  Last Fill Quantity: 0,  # refills: 0   Last office visit: No previous visit found with prescribing provider:  Last saw James Hassan on 10/24/2018   Future Office Visit:  none

## 2018-10-26 ENCOUNTER — MYC MEDICAL ADVICE (OUTPATIENT)
Dept: OBGYN | Facility: CLINIC | Age: 63
End: 2018-10-26

## 2018-10-30 ENCOUNTER — MYC MEDICAL ADVICE (OUTPATIENT)
Dept: OBGYN | Facility: CLINIC | Age: 63
End: 2018-10-30

## 2018-10-30 DIAGNOSIS — Z88.9 ALLERGY HISTORY, DRUG: Primary | ICD-10-CM

## 2018-10-30 RX ORDER — METHYLPREDNISOLONE 32 MG/1
32 TABLET ORAL DAILY
Qty: 2 TABLET | Refills: 0 | Status: SHIPPED | OUTPATIENT
Start: 2018-10-30 | End: 2019-11-21

## 2018-10-30 NOTE — TELEPHONE ENCOUNTER
Dr. Hassan would like her to take the medrol 32mg 12 hours and 2 hours prior to CT Scan.  MAURY DennisC

## 2018-11-02 ENCOUNTER — RADIANT APPOINTMENT (OUTPATIENT)
Dept: CT IMAGING | Facility: CLINIC | Age: 63
End: 2018-11-02
Attending: OBSTETRICS & GYNECOLOGY
Payer: COMMERCIAL

## 2018-11-02 DIAGNOSIS — R10.10 PAIN OF UPPER ABDOMEN: ICD-10-CM

## 2018-11-02 PROCEDURE — 74177 CT ABD & PELVIS W/CONTRAST: CPT | Performed by: RADIOLOGY

## 2018-11-02 RX ORDER — IOPAMIDOL 755 MG/ML
100 INJECTION, SOLUTION INTRAVASCULAR ONCE
Status: COMPLETED | OUTPATIENT
Start: 2018-11-02 | End: 2018-11-02

## 2018-11-02 RX ADMIN — IOPAMIDOL 100 ML: 755 INJECTION, SOLUTION INTRAVASCULAR at 09:00

## 2018-11-05 ENCOUNTER — MYC MEDICAL ADVICE (OUTPATIENT)
Dept: OBGYN | Facility: CLINIC | Age: 63
End: 2018-11-05

## 2018-11-05 NOTE — TELEPHONE ENCOUNTER
Will route to Dr. Hassan to review pt's CT scan from 11/2 (see email request 11/5) to make any necessary recommendations.

## 2018-11-05 NOTE — TELEPHONE ENCOUNTER
Discussed CT findings with the patient.  There was some dilation of her small bowel which they report is borderline.  I recommended that she follow-up with her primary care physician or a GI medicine doctor.    James Hassan MD

## 2019-01-06 DIAGNOSIS — R60.9 EDEMA, UNSPECIFIED TYPE: ICD-10-CM

## 2019-01-07 RX ORDER — TRIAMTERENE AND HYDROCHLOROTHIAZIDE 37.5; 25 MG/1; MG/1
CAPSULE ORAL
Qty: 30 CAPSULE | Refills: 0 | Status: SHIPPED | OUTPATIENT
Start: 2019-01-07 | End: 2019-04-16

## 2019-01-07 NOTE — TELEPHONE ENCOUNTER
"Requested Prescriptions   Pending Prescriptions Disp Refills     triamterene-HCTZ (DYAZIDE) 37.5-25 MG capsule [Pharmacy Med Name: TRIAMTERENE 37.5MG/ HCTZ 25MG CAPS] 180 capsule 0     Sig: TAKE 2 CAPSULES BY MOUTH EVERY DAY    Diuretics (Including Combos) Protocol Failed - 1/6/2019  3:50 AM       Failed - Normal serum creatinine on file in past 12 months    Recent Labs   Lab Test 05/30/14   CR 0.74             Failed - Normal serum potassium on file in past 12 months    Recent Labs   Lab Test 05/30/14   POTASSIUM 3.6                   Failed - Normal serum sodium on file in past 12 months    No lab results found.          Passed - Blood pressure under 140/90 in past 12 months    BP Readings from Last 3 Encounters:   10/24/18 106/70   10/19/17 122/74   09/28/16 112/60                Passed - Recent (12 mo) or future (30 days) visit within the authorizing provider's specialty    Patient had office visit in the last 12 months or has a visit in the next 30 days with authorizing provider or within the authorizing provider's specialty.  See \"Patient Info\" tab in inbasket, or \"Choose Columns\" in Meds & Orders section of the refill encounter.             Passed - Medication is active on med list       Passed - Patient is age 18 or older       Passed - No active pregancy on record       Passed - No positive pregnancy test in past 12 months          Medication is being filled for 1 time refill only due to:  pt needs an appointment for an annual exam for further refills   Alyssa Puga RN on 1/7/2019 at 7:52 AM        "

## 2019-01-14 DIAGNOSIS — H16.9 KERATITIS: ICD-10-CM

## 2019-01-14 RX ORDER — DEXAMETHASONE SODIUM PHOSPHATE 1 MG/ML
1 SOLUTION/ DROPS OPHTHALMIC EVERY OTHER DAY
Qty: 5 ML | Refills: 6 | Status: SHIPPED | OUTPATIENT
Start: 2019-01-14

## 2019-01-14 NOTE — TELEPHONE ENCOUNTER
"   dexamethasone (DECADRON) 0.1 % ophthalmic solution   Last Written Prescription Date:  8/9/18  Last Fill Quantity: 5ml,   # refills: 6  Last Office Visit : 8/9/18  Future Office visit: none    8/9/18   M Page   \" dexamethasone drops every other day right eye\"   \"continue dexamethasone 0.1% every other day\"  \"return to clinic: 6 months       Routing refill request to provider for review/approval because: provider review required.  "

## 2019-02-11 DIAGNOSIS — H16.9 KERATITIS: ICD-10-CM

## 2019-02-11 RX ORDER — ERYTHROMYCIN 5 MG/G
0.25 OINTMENT OPHTHALMIC AT BEDTIME
Qty: 1 TUBE | Refills: 11 | OUTPATIENT
Start: 2019-02-11

## 2019-04-16 ENCOUNTER — TELEPHONE (OUTPATIENT)
Dept: OBGYN | Facility: CLINIC | Age: 64
End: 2019-04-16

## 2019-04-16 DIAGNOSIS — R60.9 EDEMA, UNSPECIFIED TYPE: ICD-10-CM

## 2019-04-16 RX ORDER — TRIAMTERENE AND HYDROCHLOROTHIAZIDE 37.5; 25 MG/1; MG/1
CAPSULE ORAL
Qty: 180 CAPSULE | Refills: 1 | Status: SHIPPED | OUTPATIENT
Start: 2019-04-16 | End: 2019-10-07

## 2019-04-16 RX ORDER — TRIAMTERENE AND HYDROCHLOROTHIAZIDE 37.5; 25 MG/1; MG/1
CAPSULE ORAL
Qty: 180 CAPSULE | Refills: 0 | OUTPATIENT
Start: 2019-04-16

## 2019-04-16 NOTE — TELEPHONE ENCOUNTER
"Requested Prescriptions   Pending Prescriptions Disp Refills     triamterene-HCTZ (DYAZIDE) 37.5-25 MG capsule [Pharmacy Med Name: TRIAMTERENE 37.5MG/ HCTZ 25MG CAPS] 180 capsule 0     Sig: TAKE 2 CAPSULES BY MOUTH DAILY       Diuretics (Including Combos) Protocol Failed - 4/16/2019 12:43 PM        Failed - Normal serum creatinine on file in past 12 months     Recent Labs   Lab Test 05/30/14   CR 0.74              Failed - Normal serum potassium on file in past 12 months     Recent Labs   Lab Test 05/30/14   POTASSIUM 3.6                    Failed - Normal serum sodium on file in past 12 months     No lab results found.           Passed - Blood pressure under 140/90 in past 12 months     BP Readings from Last 3 Encounters:   10/24/18 106/70   10/19/17 122/74   09/28/16 112/60                 Passed - Recent (12 mo) or future (30 days) visit within the authorizing provider's specialty     Patient had office visit in the last 12 months or has a visit in the next 30 days with authorizing provider or within the authorizing provider's specialty.  See \"Patient Info\" tab in inbasket, or \"Choose Columns\" in Meds & Orders section of the refill encounter.              Passed - Medication is active on med list        Passed - Patient is age 18 or older        Passed - No active pregancy on record        Passed - No positive pregnancy test in past 12 months        Medication has already been refilled today  Alyssa Puga RN on 4/16/2019 at 12:47 PM      "

## 2019-04-16 NOTE — TELEPHONE ENCOUNTER
triamterene-HCTZ (DYAZIDE) 37.5-25 MG capsule    Last Written Prescription Date:  1/7/19  Last Fill Quantity: 30,   # refills: 0  Last Office Visit with [facility] primary care provider:  10/24/18  Future Office visit: none    Routing refill request to provider for review/approval because:  Refill sent un til next annual.

## 2019-08-08 DIAGNOSIS — H16.9 KERATITIS: ICD-10-CM

## 2019-08-08 RX ORDER — ACYCLOVIR 400 MG/1
400 TABLET ORAL 2 TIMES DAILY
Qty: 60 TABLET | Refills: 2 | Status: SHIPPED | OUTPATIENT
Start: 2019-08-08 | End: 2019-11-07

## 2019-08-08 NOTE — TELEPHONE ENCOUNTER
Last Clinic Visit: 8[facility]9-18 [facility]  Last clinic note:-continue acyclovir 400 mg PO BID for ppx,  Overdue lab: Cr     (Care Everywhere 7-25-18  0.95) FYI to [facility]  90 day RF sent to pharm

## 2019-09-06 DIAGNOSIS — H16.9 KERATITIS: ICD-10-CM

## 2019-09-07 RX ORDER — ERYTHROMYCIN 5 MG/G
0.25 OINTMENT OPHTHALMIC AT BEDTIME
Qty: 1 TUBE | Refills: 2 | Status: SHIPPED | OUTPATIENT
Start: 2019-09-07 | End: 2019-12-19

## 2019-09-09 ENCOUNTER — MEDICAL CORRESPONDENCE (OUTPATIENT)
Dept: HEALTH INFORMATION MANAGEMENT | Facility: CLINIC | Age: 64
End: 2019-09-09

## 2019-10-07 DIAGNOSIS — R60.9 EDEMA, UNSPECIFIED TYPE: ICD-10-CM

## 2019-10-07 RX ORDER — TRIAMTERENE AND HYDROCHLOROTHIAZIDE 37.5; 25 MG/1; MG/1
CAPSULE ORAL
Qty: 180 CAPSULE | Refills: 0 | Status: SHIPPED | OUTPATIENT
Start: 2019-10-07 | End: 2019-11-21

## 2019-10-07 NOTE — TELEPHONE ENCOUNTER
Medication is being filled for 1 time refill only due to:  Patient needs to be seen because due for annual in October.   Melva Loya RN on 10/7/2019 at 9:41 AM

## 2019-10-07 NOTE — TELEPHONE ENCOUNTER
"Requested Prescriptions   Pending Prescriptions Disp Refills     triamterene-HCTZ (DYAZIDE) 37.5-25 MG capsule [Pharmacy Med Name: TRIAMTERENE 37.5MG/ HCTZ 25MG CAPS] 180 capsule 0     Sig: TAKE 2 CAPSULES BY MOUTH DAILY       Diuretics (Including Combos) Protocol Failed - 10/7/2019  5:01 AM        Failed - Normal serum creatinine on file in past 12 months     Recent Labs   Lab Test 05/30/14   CR 0.74              Failed - Normal serum potassium on file in past 12 months     Recent Labs   Lab Test 05/30/14   POTASSIUM 3.6                    Failed - Normal serum sodium on file in past 12 months     No lab results found.           Passed - Blood pressure under 140/90 in past 12 months     BP Readings from Last 3 Encounters:   10/24/18 106/70   10/19/17 122/74   09/28/16 112/60                 Passed - Recent (12 mo) or future (30 days) visit within the authorizing provider's specialty     Patient has had an office visit with the authorizing provider or a provider within the authorizing providers department within the previous 12 mos or has a future within next 30 days. See \"Patient Info\" tab in inbasket, or \"Choose Columns\" in Meds & Orders section of the refill encounter.              Passed - Medication is active on med list        Passed - Patient is age 18 or older        Passed - No active pregancy on record        Passed - No positive pregnancy test in past 12 months        Last Written Prescription Date:  4/16/19  Last Fill Quantity: 180,  # refills: 1   Last office visit: 10/24/2018 with prescribing provider:  James Hassan   Future Office Visit:  none    "

## 2019-10-16 DIAGNOSIS — Z88.9 ALLERGY HISTORY, DRUG: ICD-10-CM

## 2019-10-16 RX ORDER — METHYLPREDNISOLONE 16 MG/1
TABLET ORAL
Qty: 4 TABLET | Refills: 0 | OUTPATIENT
Start: 2019-10-16

## 2019-10-16 NOTE — TELEPHONE ENCOUNTER
Requested Prescriptions   Pending Prescriptions Disp Refills     methylPREDNISolone (MEDROL) 16 MG tablet [Pharmacy Med Name: METHYLPREDNISOLONE 16MG TABLETS] 4 tablet 0     Sig: TAKE 2 TABLET BY MOUTH 12 HOURS AND 2 HOURS PRIOR TO CT SCAN       There is no refill protocol information for this order        Refill denied  Alyssa Puga RN on 10/16/2019 at 9:19 AM

## 2019-11-07 DIAGNOSIS — H16.9 KERATITIS: ICD-10-CM

## 2019-11-07 RX ORDER — ACYCLOVIR 400 MG/1
400 TABLET ORAL 2 TIMES DAILY
Qty: 60 TABLET | Refills: 0 | Status: SHIPPED | OUTPATIENT
Start: 2019-11-07 | End: 2019-11-21

## 2019-11-07 NOTE — TELEPHONE ENCOUNTER
2nd request for Acyclovir refill.  Last refill 8/8/19  #60 with 2 refills with note to patient to call to schedule appt.    Last appointment 8/9/18 with Dr Burnett with recommended 6 month follow up.  Per protocol Rx is to be declined.  Would you like to decline or refill?   staff - please contact Heather to schedule follow up.  Thank you

## 2019-11-21 ENCOUNTER — OFFICE VISIT (OUTPATIENT)
Dept: OBGYN | Facility: CLINIC | Age: 64
End: 2019-11-21
Payer: COMMERCIAL

## 2019-11-21 ENCOUNTER — ANCILLARY PROCEDURE (OUTPATIENT)
Dept: MAMMOGRAPHY | Facility: CLINIC | Age: 64
End: 2019-11-21
Payer: COMMERCIAL

## 2019-11-21 VITALS
WEIGHT: 174 LBS | SYSTOLIC BLOOD PRESSURE: 100 MMHG | BODY MASS INDEX: 27.31 KG/M2 | DIASTOLIC BLOOD PRESSURE: 60 MMHG | HEIGHT: 67 IN | HEART RATE: 72 BPM

## 2019-11-21 DIAGNOSIS — Z01.419 ENCOUNTER FOR GYNECOLOGICAL EXAMINATION WITHOUT ABNORMAL FINDING: Primary | ICD-10-CM

## 2019-11-21 DIAGNOSIS — N32.81 OAB (OVERACTIVE BLADDER): ICD-10-CM

## 2019-11-21 DIAGNOSIS — R60.9 EDEMA, UNSPECIFIED TYPE: ICD-10-CM

## 2019-11-21 DIAGNOSIS — Z12.31 VISIT FOR SCREENING MAMMOGRAM: ICD-10-CM

## 2019-11-21 DIAGNOSIS — R30.0 DYSURIA: ICD-10-CM

## 2019-11-21 PROCEDURE — 87624 HPV HI-RISK TYP POOLED RSLT: CPT | Performed by: OBSTETRICS & GYNECOLOGY

## 2019-11-21 PROCEDURE — 77067 SCR MAMMO BI INCL CAD: CPT | Mod: TC

## 2019-11-21 PROCEDURE — G0145 SCR C/V CYTO,THINLAYER,RESCR: HCPCS | Performed by: OBSTETRICS & GYNECOLOGY

## 2019-11-21 PROCEDURE — 77063 BREAST TOMOSYNTHESIS BI: CPT | Mod: TC

## 2019-11-21 PROCEDURE — 99396 PREV VISIT EST AGE 40-64: CPT | Performed by: OBSTETRICS & GYNECOLOGY

## 2019-11-21 RX ORDER — SOLIFENACIN SUCCINATE 10 MG/1
TABLET, FILM COATED ORAL
Qty: 90 TABLET | Refills: 1 | Status: SHIPPED | OUTPATIENT
Start: 2019-11-21 | End: 2021-02-16

## 2019-11-21 RX ORDER — TRIAMTERENE AND HYDROCHLOROTHIAZIDE 37.5; 25 MG/1; MG/1
CAPSULE ORAL
Qty: 180 CAPSULE | Refills: 0 | Status: SHIPPED | OUTPATIENT
Start: 2019-11-21 | End: 2020-04-06

## 2019-11-21 RX ORDER — SOLIFENACIN SUCCINATE 10 MG/1
10 TABLET, FILM COATED ORAL DAILY
Qty: 60 TABLET | Refills: 3 | Status: SHIPPED | OUTPATIENT
Start: 2019-11-21 | End: 2019-11-21

## 2019-11-21 RX ORDER — CIPROFLOXACIN 500 MG/1
500 TABLET, FILM COATED ORAL 2 TIMES DAILY
Qty: 14 TABLET | Refills: 0 | Status: SHIPPED | OUTPATIENT
Start: 2019-11-21 | End: 2020-12-02

## 2019-11-21 RX ORDER — PHENAZOPYRIDINE HYDROCHLORIDE 100 MG/1
200 TABLET, FILM COATED ORAL 3 TIMES DAILY PRN
Qty: 20 TABLET | Refills: 1 | Status: SHIPPED | OUTPATIENT
Start: 2019-11-21 | End: 2021-02-16

## 2019-11-21 ASSESSMENT — ANXIETY QUESTIONNAIRES
IF YOU CHECKED OFF ANY PROBLEMS ON THIS QUESTIONNAIRE, HOW DIFFICULT HAVE THESE PROBLEMS MADE IT FOR YOU TO DO YOUR WORK, TAKE CARE OF THINGS AT HOME, OR GET ALONG WITH OTHER PEOPLE: NOT DIFFICULT AT ALL
6. BECOMING EASILY ANNOYED OR IRRITABLE: NOT AT ALL
5. BEING SO RESTLESS THAT IT IS HARD TO SIT STILL: NOT AT ALL
3. WORRYING TOO MUCH ABOUT DIFFERENT THINGS: NOT AT ALL
GAD7 TOTAL SCORE: 0
2. NOT BEING ABLE TO STOP OR CONTROL WORRYING: NOT AT ALL
1. FEELING NERVOUS, ANXIOUS, OR ON EDGE: NOT AT ALL
7. FEELING AFRAID AS IF SOMETHING AWFUL MIGHT HAPPEN: NOT AT ALL

## 2019-11-21 ASSESSMENT — MIFFLIN-ST. JEOR: SCORE: 1376.89

## 2019-11-21 ASSESSMENT — PATIENT HEALTH QUESTIONNAIRE - PHQ9
SUM OF ALL RESPONSES TO PHQ QUESTIONS 1-9: 0
5. POOR APPETITE OR OVEREATING: NOT AT ALL

## 2019-11-21 NOTE — LETTER
November 27, 2019    Heather WHITE Minh  [address] [facility] [address]    Dear ,  This letter is regarding your recent Pap smear (cervical cancer screening) and Human Papillomavirus (HPV) test.  We are happy to inform you that your Pap smear result is normal. Cervical cancer is closely linked with certain types of HPV. Your results showed no evidence of high-risk HPV.  You will still need to return to the clinic every year for an annual exam and other preventive tests.  If you have additional questions regarding this result, please call our registered nurse, Debby at [phone].  Sincerely,    James Hassan MD/ronna

## 2019-11-21 NOTE — PROGRESS NOTES
Heather is a 63 year old  female who presents for annual exam.     Besides routine health maintenance, complains of continuous weight gain even with diet control and exercise. No one can give her an explanation for this after being worked up.  Also having bladder control issues s/p sling procedure in .  .  HPI:    Patient is seen for her annual exam.  She is concerned about weight gain.  She has been 18 pounds in the last 2 years.    The patient's PCP is Acosta Vila.    GYNECOLOGIC HISTORY:    No LMP recorded. Patient has had a hysterectomy.  She  reports that she has quit smoking. She has never used smokeless tobacco.  Patient is sexually active.  STD testing offered?  Declined     Last PHQ-9 score on record =   PHQ-9 SCORE 2019   PHQ-9 Total Score 0     Last GAD7 score on record =   ERICA-7 SCORE 2019   Total Score 0     Alcohol Score = 3    HEALTH MAINTENANCE:  Cholesterol:  Recent Labs   Lab Test 16  1055 14   CHOL 223* 219   HDL 60 74   * 131   TRIG 134 69     Last Mammo: 10/24/18, Result: Normal, Next Mammo: Today   Pap:   Lab Results   Component Value Date    PAP NIL, NEG-HPV 10/19/2017    PAP NIL, NEG-HPV 2016    PAP NIL 2015   Colonoscopy:  , Result: Normal, Next Colonoscopy: 10 years  Dexa:  Several years ago  Health maintenance updated:  Colonoscopy and Immunizations reviewed,    HISTORY:  OB History    Para Term  AB Living   4 4 4 0 0 4   SAB TAB Ectopic Multiple Live Births   0 0 0 0 4      # Outcome Date GA Lbr Giuseppe/2nd Weight Sex Delivery Anes PTL Lv   4 Term            3 Term            2 Term            1 Term                There is no problem list on file for this patient.    Past Surgical History:   Procedure Laterality Date     CYSTOSCOPY  2012    Procedure:CYSTOSCOPY; Surgeon:RADHA COMER; Location:[facility]     GYN SURGERY      csection, infertility, hyst     HYSTERECTOMY       HYSTERECTOMY, PAP NO LONGER  INDICATED       SLING TRANSVAGINAL  5/11/2012    Procedure:SLING TRANSVAGINAL; mini arc sling  and cystoscopy; Surgeon:RADHA COMER; Location[facility]      Social History     Tobacco Use     Smoking status: Former Smoker     Smokeless tobacco: Never Used   Substance Use Topics     Alcohol use: Yes         Negative family history of: Glaucoma, Macular Degeneration            Current Outpatient Medications   Medication Sig     acyclovir (ZOVIRAX) 400 MG tablet Take 1 tablet (400 mg) by mouth 2 times daily     albuterol (PROVENTIL HFA: VENTOLIN HFA) 108 (90 BASE) MCG/ACT inhaler Inhale 2 puffs into the lungs every 6 hours.     budesonide-formoterol (SYMBICORT) 160-4.5 MCG/ACT inhaler Inhale 2 puffs into the lungs 2 times daily     Cholecalciferol (VITAMIN D-3 PO) Take 1 tablet by mouth     ciprofloxacin (CIPRO) 500 MG tablet Take 1 tablet (500 mg) by mouth 2 times daily     CLARITIN-D 24 HOUR  MG per 24 hr tablet TK 1 T PO  D     dexamethasone (DECADRON) 0.1 % ophthalmic solution Place 1 drop into the right eye every other day     erythromycin (ROMYCIN) 5 MG/GM ophthalmic ointment Place 0.25 inches into the right eye At Bedtime Please keep 11-7-19 clinic appt for further refills     Hypromellose (ARTIFICIAL TEARS OP) Apply 1 drop to eye as needed Usually every 30 minutes.     metoprolol tartrate (LOPRESSOR) 25 MG tablet      nystatin (MYCOSTATIN) 692752 UNIT/ML suspension TAKE 5MLS BY MOUTH FOUR TIMES DAILY     phenazopyridine (PYRIDIUM) 100 MG tablet Take 2 tablets (200 mg) by mouth 3 times daily as needed     solifenacin (VESICARE) 10 MG tablet Take 1 tablet (10 mg) by mouth daily     triamterene-HCTZ (DYAZIDE) 37.5-25 MG capsule Take 2 capsules by mouth as needed for fluid retention     metoprolol succinate (TOPROL-XL) 25 MG 24 hr tablet TK SS T PO ONCE D     No current facility-administered medications for this visit.      Allergies   Allergen Reactions     Latex Anaphylaxis     Xray Dye  [Contrast Dye]  "Hives     Pt states she had allergic reaction to xray day several years ago (hives) Pt has been successfully premedicated for contrast allergy without problem.      Latex      Sulfa Drugs      Penicillins Rash     Other reaction(s): Swelling, lips/tongue  Childhood rash       Past medical, surgical, social and family histories were reviewed and updated in EPIC.    ROS:   12 point review of systems negative other than symptoms noted below or in the HPI.  Constitutional: Weight Gain  Genitourinary: Hot Flashes and Incontinence  POSITIVE for:, urgency, incontinence    EXAM:  /60   Pulse 72   Ht 1.702 m (5' 7\")   Wt 78.9 kg (174 lb)   BMI 27.25 kg/m     BMI: Body mass index is 27.25 kg/m .    PHYSICAL EXAM:  Constitutional:   Appearance: Well nourished, well developed, alert, in no acute distress  Neck:  Lymph Nodes:  No lymphadenopathy present    Thyroid:  Gland size normal, nontender, no nodules or masses present  on palpation  Chest:  Respiratory Effort:  Breathing unlabored  Cardiovascular:    Heart: Auscultation:  Regular rate, normal rhythm, no murmurs present  Breasts: Inspection of Breasts:  No lymphadenopathy present., Palpation of Breasts and Axillae:  No masses present on palpation, no breast tenderness., Axillary Lymph Nodes:  No lymphadenopathy present. and No nodularity, asymmetry or nipple discharge bilaterally.  Gastrointestinal:   Abdominal Examination:  Abdomen nontender to palpation, tone normal without rigidity or guarding, no masses present, umbilicus without lesions   Liver and Spleen:  No hepatomegaly present, liver nontender to palpation    Hernias:  No hernias present  Lymphatic: Lymph Nodes:  No other lymphadenopathy present  Skin:  General Inspection:  No rashes present, no lesions present, no areas of  discoloration  Neurologic:    Mental Status:  Oriented X3.  Normal strength and tone, sensory exam                grossly normal, mentation intact and speech normal.    Psychiatric:   " Mentation appears normal and affect normal/bright.         Pelvic Exam:  External Genitalia:     Normal appearance for age, no discharge present, no tenderness present, no inflammatory lesions present, color normal  Vagina:     Normal vaginal vault without central or paravaginal defects, no discharge present, no inflammatory lesions present, no masses present  Bladder:     Nontender to palpation  Urethra:   Urethral Body:  Urethra palpation normal, urethra structural support normal   Urethral Meatus:  No erythema or lesions present  Cervix:     Surgically absent  Uterus:     Surgically absent  Adnexa:     Surgically absent  Perineum:     Perineum within normal limits, no evidence of trauma, no rashes or skin lesions present  Anus:     Anus within normal limits, no hemorrhoids present  Inguinal Lymph Nodes:     No lymphadenopathy present    COUNSELING:   Reviewed preventive health counseling, as reflected in patient instructions       Regular exercise       Healthy diet/nutrition    BMI: Body mass index is 27.25 kg/m .  Weight management plan: Patient was referred to their PCP to discuss a diet and exercise plan.    ASSESSMENT:  63 year old female with satisfactory annual exam.    ICD-10-CM    1. Encounter for gynecological examination without abnormal finding Z01.419 Pap imaged thin layer screen with HPV - recommended age 30 - 65     HPV High Risk Types DNA Cervical   2. Dysuria R30.0 ciprofloxacin (CIPRO) 500 MG tablet     phenazopyridine (PYRIDIUM) 100 MG tablet   3. Edema, unspecified type R60.9 triamterene-HCTZ (DYAZIDE) 37.5-25 MG capsule   4. OAB (overactive bladder) N32.81 solifenacin (VESICARE) 10 MG tablet       PLAN:  Follow up in few months on OAB.    James Hassan MD

## 2019-11-22 ASSESSMENT — ANXIETY QUESTIONNAIRES: GAD7 TOTAL SCORE: 0

## 2019-11-26 LAB
COPATH REPORT: NORMAL
PAP: NORMAL

## 2019-11-27 LAB
FINAL DIAGNOSIS: NORMAL
HPV HR 12 DNA CVX QL NAA+PROBE: NEGATIVE
HPV16 DNA SPEC QL NAA+PROBE: NEGATIVE
HPV18 DNA SPEC QL NAA+PROBE: NEGATIVE
SPECIMEN DESCRIPTION: NORMAL
SPECIMEN SOURCE CVX/VAG CYTO: NORMAL

## 2019-12-06 DIAGNOSIS — H16.9 KERATITIS: Primary | ICD-10-CM

## 2019-12-06 RX ORDER — ACYCLOVIR 400 MG/1
TABLET ORAL
Qty: 60 TABLET | Refills: 0 | Status: SHIPPED | OUTPATIENT
Start: 2019-12-06 | End: 2020-12-30

## 2019-12-06 NOTE — TELEPHONE ENCOUNTER
Last Clinic Visit: 8/9/18 with Dr Burnett, [place] with Jennifer 12/19/19  Last clinic note: continue acyclovir 400 mg PO BID for ppx, can consider trial off if patient desires

## 2019-12-19 ENCOUNTER — OFFICE VISIT (OUTPATIENT)
Dept: OPHTHALMOLOGY | Facility: CLINIC | Age: 64
End: 2019-12-19
Attending: OPHTHALMOLOGY
Payer: COMMERCIAL

## 2019-12-19 DIAGNOSIS — H16.9 KERATITIS: ICD-10-CM

## 2019-12-19 DIAGNOSIS — B00.52 HERPES SIMPLEX DISCIFORM KERATITIS: Primary | ICD-10-CM

## 2019-12-19 DIAGNOSIS — H02.59 FLOPPY EYELID SYNDROME OF BOTH EYES: ICD-10-CM

## 2019-12-19 DIAGNOSIS — H04.123 DRY EYE SYNDROME OF BOTH EYES: ICD-10-CM

## 2019-12-19 PROCEDURE — G0463 HOSPITAL OUTPT CLINIC VISIT: HCPCS | Mod: ZF

## 2019-12-19 RX ORDER — DEXAMETHASONE SODIUM PHOSPHATE 1 MG/ML
1 SOLUTION/ DROPS OPHTHALMIC EVERY OTHER DAY
Qty: 5 ML | Refills: 11 | Status: CANCELLED | OUTPATIENT
Start: 2019-12-19

## 2019-12-19 RX ORDER — ERYTHROMYCIN 5 MG/G
OINTMENT OPHTHALMIC
Qty: 1 TUBE | Refills: 11 | Status: SHIPPED | OUTPATIENT
Start: 2019-12-19 | End: 2021-02-19

## 2019-12-19 RX ORDER — ACYCLOVIR 400 MG/1
400 TABLET ORAL 2 TIMES DAILY
Qty: 60 TABLET | Refills: 11 | Status: SHIPPED | OUTPATIENT
Start: 2019-12-19 | End: 2020-12-30

## 2019-12-19 ASSESSMENT — REFRACTION_WEARINGRX
OS_CYLINDER: +0.50
OD_SPHERE: +6.25
OD_CYLINDER: +0.25
OD_AXIS: 019
OS_ADD: +2.50
OS_AXIS: 040
OS_SPHERE: +6.00
OD_ADD: +2.50
SPECS_TYPE: PAL

## 2019-12-19 ASSESSMENT — VISUAL ACUITY
OS_CC: 20/25
CORRECTION_TYPE: GLASSES
METHOD: SNELLEN - LINEAR
OS_CC+: -1
OD_CC: 20/25

## 2019-12-19 ASSESSMENT — CONF VISUAL FIELD
OS_NORMAL: 1
OD_NORMAL: 1
METHOD: COUNTING FINGERS

## 2019-12-19 ASSESSMENT — TONOMETRY
IOP_METHOD: ICARE
OD_IOP_MMHG: 9
OS_IOP_MMHG: 9

## 2019-12-19 ASSESSMENT — EXTERNAL EXAM - LEFT EYE: OS_EXAM: NO VESICULAR LESIONS

## 2019-12-19 ASSESSMENT — EXTERNAL EXAM - RIGHT EYE: OD_EXAM: NO VESICULAR LESIONS

## 2019-12-19 ASSESSMENT — CUP TO DISC RATIO
OS_RATIO: 0.3
OD_RATIO: 0.3

## 2019-12-19 NOTE — PROGRESS NOTES
"HPI: Heather Wilkinson is a 61 year old female who presents for 6 month followup of right eye keratitis. (see initial evaluation on 6/24/16).     Interval hx:   -Reports vision stable. Has not had an outbreak since initiating acyclovir initially for primary treatment. She states it has also helped her oral herpetic outbreaks as well which she used to get \"all the time\" She states she has mild dry eye symptoms that are controlled with artificial tears in both eyes. No complaints today. No pain redness or discharge. No flashes or floaters. No photophobia. No rash.     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 recently and previously weekly CL use (>15 years)       -permanent silicone plug in place RIGHT LOWER LID   -continue acyclovir 400 mg PO BID for ppx (this is also an appropriate dose for oral mucocutaneous lesions, which pt would like to continue)   -stop dexamethasone 0.1% every other day     -continue PF AT QID and as needed    -continue EES or Refresh PM at bedtime/flat nightmask (pt has not been evaluated in past for sleep apnea)   - recommend using eyelid scrubs to remove makeup nightly    return to clinic: Annually, sooner if problems     Pt may try daily CLs    --  Pati Bates, DO  PGY 5, Cornea Fellow  Ophthalmology    Attending Physician Attestation:  Complete documentation of historical and exam elements from today's encounter can be found in the full " encounter summary report (not reduplicated in this progress note).  I personally obtained the chief complaint(s) and history of present illness.  I confirmed and edited as necessary the review of systems, past medical/surgical history, family history, social history, and examination findings as documented by others; and I examined the patient myself.  I personally reviewed the relevant tests, images, and reports as documented above.  I formulated and edited as necessary the assessment and plan and discussed the findings and management plan with the patient and family. - Alexander Rodriguez MD

## 2019-12-19 NOTE — LETTER
"12/19/2019       RE: Heather Wilkinson  [address] [facility] [address]     Dear Colleague,    Thank you for referring your patient, Heather Wilkinson, to the EYE CLINIC at [facility]. Please see a copy of my visit note below.    HPI: Heather Wilkinson is a 61 year old female who presents for 6 month followup of right eye keratitis. (see initial evaluation on 6/24/16).     Interval hx:   -Reports vision stable. Has not had an outbreak since initiating acyclovir initially for primary treatment. She states it has also helped her oral herpetic outbreaks as well which she used to get \"all the time\" She states she has mild dry eye symptoms that are controlled with artificial tears in both eyes. No complaints today. No pain redness or discharge. No flashes or floaters. No photophobia. No rash.     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 recently and previously weekly CL use (>15 years)       -permanent silicone plug in place RIGHT LOWER LID   -continue acyclovir 400 mg PO BID for ppx (this is also an appropriate dose for oral mucocutaneous lesions, which pt would like to continue)   -stop dexamethasone 0.1% every other day     -continue PF AT QID and as needed    -continue EES or Refresh PM at bedtime/flat nightmask (pt has not been evaluated in past for sleep apnea)   - recommend using eyelid scrubs to remove makeup nightly    return " to clinic: Annually, sooner if problems     Pt may try daily CLs        Again, thank you for allowing me to participate in the care of your patient.      Sincerely,    Alexander Rodriguez MD

## 2019-12-19 NOTE — NURSING NOTE
Chief Complaints and History of Present Illnesses   Patient presents with     Follow Up     Floppy eyelid syndrome - Both Eyes.  Corneal ulcer.     Chief Complaint(s) and History of Present Illness(es)     Follow Up     Associated symptoms: dryness and glare (BE.).  Negative for eye pain, tearing, flashes, floaters and haloes    Comments: Floppy eyelid syndrome - Both Eyes.  Corneal ulcer.              Comments     Pt states vision is stable.  Pt states glare is bad at night.  Pt is wondering is they still have to take drops and ointment and needs an Rx refill.  Pt has no pain or other concerns at this time.    ANTONIO Interiano December 19, 2019 7:46 AM

## 2020-03-02 ENCOUNTER — HEALTH MAINTENANCE LETTER (OUTPATIENT)
Age: 65
End: 2020-03-02

## 2020-03-11 ENCOUNTER — OFFICE VISIT (OUTPATIENT)
Dept: ALLERGY | Facility: OTHER | Age: 65
End: 2020-03-11
Payer: COMMERCIAL

## 2020-03-11 VITALS
WEIGHT: 175 LBS | HEIGHT: 66 IN | OXYGEN SATURATION: 97 % | TEMPERATURE: 97.8 F | HEART RATE: 78 BPM | SYSTOLIC BLOOD PRESSURE: 106 MMHG | BODY MASS INDEX: 28.12 KG/M2 | DIASTOLIC BLOOD PRESSURE: 62 MMHG

## 2020-03-11 DIAGNOSIS — J45.40 MODERATE PERSISTENT ASTHMA WITHOUT COMPLICATION: Primary | ICD-10-CM

## 2020-03-11 DIAGNOSIS — J31.0 RHINOCONJUNCTIVITIS: ICD-10-CM

## 2020-03-11 DIAGNOSIS — T63.481A ALLERGIC REACTION TO HYMENOPTERA VENOM: ICD-10-CM

## 2020-03-11 DIAGNOSIS — Z91.018 FOOD ALLERGY: ICD-10-CM

## 2020-03-11 DIAGNOSIS — H10.9 RHINOCONJUNCTIVITIS: ICD-10-CM

## 2020-03-11 LAB
BASOPHILS # BLD AUTO: 0 10E9/L (ref 0–0.2)
BASOPHILS NFR BLD AUTO: 0.7 %
DIFFERENTIAL METHOD BLD: NORMAL
EOSINOPHIL # BLD AUTO: 0.3 10E9/L (ref 0–0.7)
EOSINOPHIL NFR BLD AUTO: 5.6 %
ERYTHROCYTE [DISTWIDTH] IN BLOOD BY AUTOMATED COUNT: 13.9 % (ref 10–15)
FEF 25/75: NORMAL
FEV-1: NORMAL
FEV1/FVC: NORMAL
FVC: NORMAL
HCT VFR BLD AUTO: 41.3 % (ref 35–47)
HGB BLD-MCNC: 13.7 G/DL (ref 11.7–15.7)
LYMPHOCYTES # BLD AUTO: 1.2 10E9/L (ref 0.8–5.3)
LYMPHOCYTES NFR BLD AUTO: 22.1 %
MCH RBC QN AUTO: 30.9 PG (ref 26.5–33)
MCHC RBC AUTO-ENTMCNC: 33.2 G/DL (ref 31.5–36.5)
MCV RBC AUTO: 93 FL (ref 78–100)
MONOCYTES # BLD AUTO: 0.8 10E9/L (ref 0–1.3)
MONOCYTES NFR BLD AUTO: 14 %
NEUTROPHILS # BLD AUTO: 3.2 10E9/L (ref 1.6–8.3)
NEUTROPHILS NFR BLD AUTO: 57.6 %
PLATELET # BLD AUTO: 269 10E9/L (ref 150–450)
RBC # BLD AUTO: 4.44 10E12/L (ref 3.8–5.2)
WBC # BLD AUTO: 5.6 10E9/L (ref 4–11)

## 2020-03-11 PROCEDURE — 86003 ALLG SPEC IGE CRUDE XTRC EA: CPT | Mod: 59 | Performed by: ALLERGY & IMMUNOLOGY

## 2020-03-11 PROCEDURE — 99000 SPECIMEN HANDLING OFFICE-LAB: CPT | Performed by: ALLERGY & IMMUNOLOGY

## 2020-03-11 PROCEDURE — 94060 EVALUATION OF WHEEZING: CPT | Performed by: ALLERGY & IMMUNOLOGY

## 2020-03-11 PROCEDURE — 99204 OFFICE O/P NEW MOD 45 MIN: CPT | Mod: 25 | Performed by: ALLERGY & IMMUNOLOGY

## 2020-03-11 PROCEDURE — 82785 ASSAY OF IGE: CPT | Performed by: ALLERGY & IMMUNOLOGY

## 2020-03-11 PROCEDURE — 86005 ALLG SPEC IGE MULTIALLG SCR: CPT | Mod: 59 | Performed by: ALLERGY & IMMUNOLOGY

## 2020-03-11 PROCEDURE — 86003 ALLG SPEC IGE CRUDE XTRC EA: CPT | Mod: 90 | Performed by: ALLERGY & IMMUNOLOGY

## 2020-03-11 PROCEDURE — 36415 COLL VENOUS BLD VENIPUNCTURE: CPT | Performed by: ALLERGY & IMMUNOLOGY

## 2020-03-11 PROCEDURE — 85025 COMPLETE CBC W/AUTO DIFF WBC: CPT | Performed by: ALLERGY & IMMUNOLOGY

## 2020-03-11 RX ORDER — ALBUTEROL SULFATE 1.25 MG/3ML
2.5 SOLUTION RESPIRATORY (INHALATION) EVERY 4 HOURS PRN
Qty: 30 VIAL | Refills: 4 | Status: SHIPPED | OUTPATIENT
Start: 2020-03-11 | End: 2023-08-08

## 2020-03-11 RX ORDER — BUDESONIDE AND FORMOTEROL FUMARATE DIHYDRATE 160; 4.5 UG/1; UG/1
2 AEROSOL RESPIRATORY (INHALATION) 2 TIMES DAILY
Qty: 1 INHALER | Refills: 11 | Status: SHIPPED | OUTPATIENT
Start: 2020-03-11 | End: 2020-11-17

## 2020-03-11 RX ORDER — PREDNISONE 10 MG/1
30 TABLET ORAL 2 TIMES DAILY
Qty: 30 TABLET | Refills: 0 | Status: SHIPPED | OUTPATIENT
Start: 2020-03-11 | End: 2020-03-16

## 2020-03-11 RX ORDER — EPINEPHRINE 0.3 MG/.3ML
0.3 INJECTION SUBCUTANEOUS PRN
Qty: 2 EACH | Refills: 1 | Status: SHIPPED | OUTPATIENT
Start: 2020-03-11 | End: 2022-04-12

## 2020-03-11 RX ORDER — EPINEPHRINE 0.3 MG/.3ML
0.3 INJECTION SUBCUTANEOUS PRN
Qty: 2 EACH | Refills: 1 | Status: SHIPPED | OUTPATIENT
Start: 2020-03-11 | End: 2020-03-11

## 2020-03-11 RX ORDER — ALBUTEROL SULFATE 0.83 MG/ML
2.5 SOLUTION RESPIRATORY (INHALATION) ONCE
Status: COMPLETED | OUTPATIENT
Start: 2020-03-11 | End: 2020-03-11

## 2020-03-11 RX ORDER — MONTELUKAST SODIUM 10 MG/1
10 TABLET ORAL AT BEDTIME
Qty: 30 TABLET | Refills: 11 | Status: SHIPPED | OUTPATIENT
Start: 2020-03-11 | End: 2020-12-02

## 2020-03-11 RX ADMIN — ALBUTEROL SULFATE 2.5 MG: 0.83 SOLUTION RESPIRATORY (INHALATION) at 14:49

## 2020-03-11 ASSESSMENT — MIFFLIN-ST. JEOR: SCORE: 1352.6

## 2020-03-11 NOTE — LETTER
CLAUDIA                   FOOD ALLERGY & ANAPHYLAXIS EMERGENCY CARE PLAN  [employer] Education         Name: Heather SAGASTUME.:  [id number]    Allergy to: Venom, banana, latex, avocado.  Weight: 175 lbs 0 oz lbs.  Asthma:  Yes  (higher risk for a severe reaction)    -NOTE: Do not depend on antihistamines or inhalers (bronchodilators) to treat a severe reaction. USE EPINEPHRINE.     MEDICATIONS/DOSES  Epinephrine Brand: Auvi Q  Epinephrine Dose: 0.3 mg IM  Antihistamine Brand or Generic: Zyrtec (Cetirizine)  Antihistamine Dose: 10mg  Other (e.g., inhaler-bronchodilator if wheezing): albuterol  Glucagon 1mg as needed if anaphylaxis non responding to Auidalia TAYLOR                   FOOD ALLERGY & ANAPHYLAXIS EMERGENCY CARE PLAN   [employer] Education           EMERGENCY CONTACTS - CALL 911  DOCTOR:  Abundio Joaquin DO   PHONE: [phone]  PARENT/GUARDIAN:              PHONE:  OTHER EMERGENCY CONTACTS  NAME/RELATIONSHIP:   PHONE:   NAME/RELATIONSHIP:    PHONE:           PARENT/GUARDIAN AUTHORIZATION SIGNATURE     DATE              PHYSICIAN/H CP AUTHORIZATION SIGNATURE         DATE  FORM PROVIDED [employer] OF FO[employer]ER[employer]UC[employer] (FARE) ([email or web address]) 2014

## 2020-03-11 NOTE — PROGRESS NOTES
Heather Wilkinson is a 64 year old White female with previous medical history significant for asthma, allergic rhinitis, food allergy, venom allergy. Heather Wilkinson is being seen today for evaluation of allergies to food, asthma and seasonal allergies.     Patient has had asthma since childhood.  She has been on Symbicort 160/4.5 mcg 2 puffs inhaled twice daily.  Asthma is typically well controlled on Symbicort.  Triggers for asthma include cold air, URI, exercise, allergies, cats, dogs, mowing grass and raking leaves.  Previously she was on Advair.  Better controlled on Symbicort and Advair.  She has been on Singulair in the past.  She is no longer using.  She has been on allergy shots but discontinued secondary to systemic reactions.  She has allergic rhinoconjunctivitis and was most recently tested 10 to 12 years ago.  Perennial symptoms to get worse spring through fall.  Rhinorrhea, congestion, ocular watering, and ocular itching and postnasal drainage.  She will use diphenhydramine and Claritin-D.  She has been on Flonase.  She did not find Flonase to be beneficial.    Patient reports that with avocado she will have tingling of her lips.  With bananas she will develop lip swelling, vomiting and tightness in throat.  With shellfish she will develop throat swelling.  She reports that with latex exposure she will have cutaneous rash development.  No systemic symptoms with latex.  With insect stings and in childhood she has developed shortness of breath, wheezing, tightness in throat, angioedema and hives.    ENVIRONMENTAL HISTORY: [place] The home is heated with a forced air. They do have central air conditioning. The patient's bedroom is furnished with carpeting in bedroom.  Pets inside the house include 1 dog(s). There is no history of cockroach or mice infestation. There is/are 0 smokers in the house.  The house does not have a damp basement.          ACT Total  Scores 3/11/2020   ACT TOTAL SCORE (Goal Greater than or Equal to 20) 21   In the past 12 months, how many times did you visit the emergency room for your asthma without being admitted to the hospital? 0   In the past 12 months, how many times were you hospitalized overnight because of your asthma? 0         Past Medical History:   Diagnosis Date     Allergic rhinitis      Asthma      Atrial fibrillation (H)      Cough      Fluid retention      Tachycardia      Family History   Problem Relation Age of Onset     Glaucoma No family hx of      Macular Degeneration No family hx of      Past Surgical History:   Procedure Laterality Date     CYSTOSCOPY  5/11/2012    Procedure:CYSTOSCOPY; Surgeon:RADHA COMER; Location:[facility]     GYN SURGERY      csection, infertility, hyst     HYSTERECTOMY  1992     HYSTERECTOMY, PAP NO LONGER INDICATED       SLING TRANSVAGINAL  5/11/2012    Procedure:SLING TRANSVAGINAL; mini arc sling  and cystoscopy; Surgeon:RADHA COMER; Location:[facility]         Patient supplied answers from flow sheet for:  Allergy and Asthma Intake Questionnaire.  Allergy and Asthma Questionnaire Social History  Family Doctor: : Dr Vila  Did He or She suggest you see us?: No  Clinic:: [facility]  Check the reasons for your appointment: Asthma or cough  Tell us about your home: House, Dogs, Bedroom [place]pet[place], Air conditioning, Room air   Job:: -[employer]    Allergy Testing  Have you been Tested for Allergies? : Yes  If yes, when?: 2012  At what clinic?: Dr Ld Kearns       Asthma or Cough  Check any symptoms you have:: Wheeze, Shortness of breath, Productive cough (cough with mucus)  Have you been diagnosed with asthma? : Yes  Age: : 6  Did you have symptoms as a child?: Yes  Age symptoms started: : 3  Have you ever stayed in the hospital for asthma?: Yes  How many times: : 12  Have you ever gone to the ER for  asthma?: Yes  How many times:: 4  Have you used prednisone (a steroid pill) for asthma in the past 12 months?: Yes  How many times:: 99  Do you have heartburn?: No  Did symptoms cause you to miss school or work in the past year?: No  How many days:: 0  Have you used inhalers for symptoms?: Yes  Names:: Albuterol;Symbicort;Pro air;Advair  When do asthma or cough symptoms occur? : All year    Did you have symptoms as a child?: Yes        REVIEW OF SYSTEMS:  General: negative for weight gain. negative for weight loss. negative for changes in sleep.   Ears: negative for fullness. negative for hearing loss. negative for dizziness.   Nose: negative for snoring.negative for changes in smell. negative for drainage.   Eyes: negative for eye watering. negative for eye itching. negative for vision changes. negative for eye redness.  Throat: negative for hoarseness. negative for sore throat. negative for trouble swallowing.   Lungs: positive  for shortness of breath.positive  for wheezing. positive  for sputum production.   Cardiovascular: negative for chest pain. negative for swelling of ankles. negative for fast or irregular heartbeat.   Gastrointestinal: negative for nausea. negative for heartburn. negative for acid reflux.   Musculoskeletal: negative for joint pain. negative for joint stiffness. negative for joint swelling.   Neurologic: negative for seizures. negative for fainting. negative for weakness.   Psychiatric: negative for changes in mood. negative for anxiety.   Endocrine: negative for cold intolerance. negative for heat intolerance. negative for tremors.   Lymphatic: negative for lower extremity swelling. negative for lymph node swelling.   Hematologic: negative for easy bruising. negative for easy bleeding.  Integumentary: negative for rash. negative for scaling. negative for nail changes.       Current Outpatient Medications:      acyclovir (ZOVIRAX) 400 MG tablet, Take 1 tablet (400 mg) by mouth 2 times  daily, Disp: 60 tablet, Rfl: 11     acyclovir (ZOVIRAX) 400 MG tablet, Take 1 tablet (400 mg) by mouth 2 times daily  Please keep appointment on 12/19/19, Disp: 60 tablet, Rfl: 0     albuterol (ACCUNEB) 1.25 MG/3ML neb solution, Take 2 vials (2.5 mg) by nebulization every 4 hours as needed for shortness of breath / dyspnea or wheezing, Disp: 30 vial, Rfl: 4     albuterol (PROVENTIL HFA: VENTOLIN HFA) 108 (90 BASE) MCG/ACT inhaler, Inhale 2 puffs into the lungs every 6 hours., Disp: , Rfl:      budesonide-formoterol (SYMBICORT) 160-4.5 MCG/ACT Inhaler, Inhale 2 puffs into the lungs 2 times daily, Disp: 1 Inhaler, Rfl: 11     budesonide-formoterol (SYMBICORT) 160-4.5 MCG/ACT inhaler, Inhale 2 puffs into the lungs 2 times daily, Disp: , Rfl:      Cholecalciferol (VITAMIN D-3 PO), Take 1 tablet by mouth, Disp: , Rfl:      ciprofloxacin (CIPRO) 500 MG tablet, Take 1 tablet (500 mg) by mouth 2 times daily, Disp: 14 tablet, Rfl: 0     CLARITIN-D 24 HOUR  MG per 24 hr tablet, TK 1 T PO  D, Disp: , Rfl: 0     dexamethasone (DECADRON) 0.1 % ophthalmic solution, Place 1 drop into the right eye every other day, Disp: 5 mL, Rfl: 6     EPINEPHrine (AUVI-Q) 0.3 MG/0.3ML injection 2-pack, Inject 0.3 mLs (0.3 mg) into the muscle as needed for anaphylaxis, Disp: 2 each, Rfl: 1     erythromycin (ROMYCIN) 5 MG/GM ophthalmic ointment, 1 application to each eye at bedtime, Disp: 1 Tube, Rfl: 11     glucagon 1 MG kit, Inject 1mg as needed for anaphylaxis that is non-responding to epinephrine., Disp: 1 mg, Rfl: 0     Hypromellose (ARTIFICIAL TEARS OP), Apply 1 drop to eye as needed Usually every 30 minutes., Disp: , Rfl:      metoprolol tartrate (LOPRESSOR) 25 MG tablet, , Disp: , Rfl:      montelukast (SINGULAIR) 10 MG tablet, Take 1 tablet (10 mg) by mouth At Bedtime, Disp: 30 tablet, Rfl: 11     nystatin (MYCOSTATIN) 839856 UNIT/ML suspension, TAKE 5MLS BY MOUTH FOUR TIMES DAILY, Disp: , Rfl:      phenazopyridine (PYRIDIUM) 100 MG  tablet, Take 2 tablets (200 mg) by mouth 3 times daily as needed, Disp: 20 tablet, Rfl: 1     predniSONE (DELTASONE) 10 MG tablet, Take 3 tablets (30 mg) by mouth 2 times daily for 5 days, Disp: 30 tablet, Rfl: 0     solifenacin (VESICARE) 10 MG tablet, TAKE 1 TABLET BY MOUTH DAILY, Disp: 90 tablet, Rfl: 1     triamterene-HCTZ (DYAZIDE) 37.5-25 MG capsule, Take 2 capsules by mouth as needed for fluid retention, Disp: 180 capsule, Rfl: 0  No current facility-administered medications for this visit.   Immunization History   Administered Date(s) Administered     DTaP, Unspecified 09/05/2014     Pneumococcal 23 valent 10/16/2013     TD (ADULT, 7+) 04/01/2003     Allergies   Allergen Reactions     Latex Anaphylaxis     Xray Dye  [Contrast Dye] Hives     Pt states she had allergic reaction to xray day several years ago (hives) Pt has been successfully premedicated for contrast allergy without problem.      Latex      Sulfa Drugs      Penicillins Rash     Other reaction(s): Swelling, lips/tongue  Childhood rash         EXAM:   Constitutional:  Appears well-developed and well-nourished. No distress.   HEENT:   Head: Normocephalic.   No cobblestoning of posterior oropharynx.   Nasal tissue pink and normal appearing.  No rhinorrhea noted.    Eyes: Conjunctivae are non-erythematous   No maxillary or frontal sinus tenderness to palpation.   Cardiovascular: Normal rate, regular rhythm and normal heart sounds. Exam reveals no gallop and no friction rub.   No murmur heard.  Respiratory: Effort normal and breath sounds normal. No respiratory distress. No wheezes. No rales.   Musculoskeletal: Normal range of motion.   Neuro: Oriented to person, place, and time.  Skin: Skin is warm and dry. No rash noted.   Psychiatric: Normal mood and affect.     Nursing note and vitals reviewed.      WORKUP:   Spirometry-pre  FVC % pred:103  FEV1 % pred:74  FEV1/FVC % act:55    Mild obstruction    Spirometry-post  FVC % pred:89  FEV1 %  pred:72  FEV1/FVC % act:63    No significant change in FEV1.  Patient likely at baseline.          ASSESSMENT/PLAN:  Problem List Items Addressed This Visit        Respiratory    Moderate persistent asthma without complication - Primary     Moderate persistent asthma.  Well-controlled on Symbicort 160/4.5 mcg 2 puff inhaled twice daily.  Flares with URI, exercise, allergies, cats, dogs, cold air, mowing grass, raking leaves.  Previously on Advair.  Symbicort resulted in better control.  Additionally she was on Singulair.    Spirometry with mild obstruction.  No significant improvement postbronchodilator.  This is likely her baseline FEV1.  ACT 21    -Asthma action plan provided and reviewed with the patient.  -Symbicort 160/4.5 mcg 2 puff inhaled twice daily.  -Singulair 10 mg by mouth daily.  -Albuterol 2-4 puffs inhaled (use a spacer unless using a Proair Respiclick device) every 4 hours as needed for chest tightness, wheezing, shortness of breath and/or coughing.   -Total IgE.  -CBC.  -Repeating allergy evaluation as noted.           Relevant Medications    albuterol (PROVENTIL) neb solution 2.5 mg (Completed)    budesonide-formoterol (SYMBICORT) 160-4.5 MCG/ACT Inhaler    montelukast (SINGULAIR) 10 MG tablet    predniSONE (DELTASONE) 10 MG tablet    albuterol (ACCUNEB) 1.25 MG/3ML neb solution    Other Relevant Orders    Spirometry, Breathing Capacity (Completed)    Allergen honeybee IgE (Completed)    Allergen paper wasp IgE (Completed)    Allergen yellow hornet IgE (Completed)    Allergen whiteface hornet IgE (Completed)    Allergen common wasp IgE (Completed)    CBC with platelets differential (Completed)    IgE (Completed)    Allergen Feather mix (Completed)       Immune    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  She reports positive testing in the past.  No longer caring injectable epinephrine.    - Serum IgE for venom.  -Avoid venom insect stings.  -  An anaphylaxis action plan was given and reviewed with patient and family.   - Injectable epinephrine use was reviewed and demonstrated. The patient will need to carry injectable epinephrine.   - Injectable epinephrine script provided.            Relevant Medications    albuterol (PROVENTIL) neb solution 2.5 mg (Completed)    budesonide-formoterol (SYMBICORT) 160-4.5 MCG/ACT Inhaler    montelukast (SINGULAIR) 10 MG tablet    albuterol (ACCUNEB) 1.25 MG/3ML neb solution       Infectious/Inflammatory    Rhinoconjunctivitis     History of allergic rhinoconjunctivitis.  Has been on allergen immunotherapy in the past.  Not recently.  Stopped immunotherapy secondary to systemic reactions.  She reports that she was allergic to everything.  I do not have these results.    -Serum IgE for environmental allergens.  -Claritin-D as needed.  - Singulair 10 mg by mouth daily.  -She had been on Flonase in the past and found not to be beneficial.         Relevant Medications    montelukast (SINGULAIR) 10 MG tablet    Other Relevant Orders    Allergen cat epithellium IgE (Completed)    Allergen dog epithelium IgE (Completed)    Allergen lauren IgE (Completed)    Allergen D pteronyssinus IgE (Completed)    Allergen D farinae IgE (Completed)    Allergen alternaria alternata IgE (Completed)    Allergen aspergillus fumigatus IgE (Completed)    Allergen cladosporium herbarum IgE (Completed)    Allergen Epicoccum purpurascens IgE (Completed)    Allergen penicillium notatum IgE (Completed)    Allergen hima white IgE (Completed)    Allergen cottonwood IgE (Completed)    Allergen elm IgE (Completed)    Allergen maple box elder IgE (Completed)    Allergen oak white IgE (Completed)    Allergen silver  birch IgE (Completed)    Allergen Yolo Tree (Completed)    Allergen giant ragweed IgE (Completed)    Allergen English plantain IgE (Completed)    Allergen ragweed short IgE (Completed)    Allergen Mugwort IgE (Completed)    Allergen Sheep Sorrel  IgE (Completed)    Allergen Feather mix (Completed)       Other    Food allergy     Symptoms with avocado, banana, latex.    -Serum IgE for avocado, banana and latex.  - Anaphylaxis action plan was provided.  - Prescription for epinephrine was provided.  -She is on beta-blocker.  Discussed with patient that beta-blocker could interfere with epinephrine.  Prescription for glucagon was provided.  Instructed when and how to use.         Relevant Medications    albuterol (PROVENTIL) neb solution 2.5 mg (Completed)    budesonide-formoterol (SYMBICORT) 160-4.5 MCG/ACT Inhaler    montelukast (SINGULAIR) 10 MG tablet    glucagon 1 MG kit    EPINEPHrine (AUVI-Q) 0.3 MG/0.3ML injection 2-pack    albuterol (ACCUNEB) 1.25 MG/3ML neb solution    Other Relevant Orders    Allergen banana IgE (Completed)    Allergen latex IgE (Completed)    Allergen Avocado IgE (Completed)    Allergen lobster IgE (Completed)    Allergen crab IgE (Completed)    Allergen shrimp IgE (Completed)          Chart documentation with Dragon Voice recognition Software. Although reviewed after completion, some words and grammatical errors may remain.    Abundio Joaquin DO FAAAAI  Allergy/Immunology  [place], [place]

## 2020-03-11 NOTE — ASSESSMENT & PLAN NOTE
History of allergic rhinoconjunctivitis.  Has been on allergen immunotherapy in the past.  Not recently.  Stopped immunotherapy secondary to systemic reactions.  She reports that she was allergic to everything.  I do not have these results.    -Serum IgE for environmental allergens.  -Claritin-D as needed.  - Singulair 10 mg by mouth daily.  -She had been on Flonase in the past and found not to be beneficial.

## 2020-03-11 NOTE — LETTER
My Asthma Action Plan    Name: Heather Wilkinson   YOB: 1955  Date: 3/11/2020   My doctor: Abundio Joaquin, DO   My clinic: [facility]        My Control Medicine: Budesonide + formoterol (Symbicort HFA) -  160/4.5 mcg 2 puffs twice daily  Montelukast (Singulair) -  10 mg daily  My Rescue Medicine: Albuterol (Proair/Ventolin/Proventil HFA) 2-4 puffs EVERY 4 HOURS as needed. Use a spacer if recommended by your provider.  My Oral Steroid Medicine: Prednisone 30mg by mouth twice daily foro 5 days.  My Asthma Severity:   Moderate Persistent  Know your asthma triggers: smoke, upper respiratory infections, pollens, animal dander, exercise or sports and cold air               GREEN ZONE   Good Control    I feel good    No cough or wheeze    Can work, sleep and play without asthma symptoms       Take your asthma control medicine every day.     1. If exercise triggers your asthma, take your rescue medication    15 minutes before exercise or sports, and    During exercise if you have asthma symptoms  2. Spacer to use with inhaler: If you have a spacer, make sure to use it with your inhaler             YELLOW ZONE Getting Worse  I have ANY of these:    I do not feel good    Cough or wheeze    Chest feels tight    Wake up at night   1. Keep taking your Green Zone medications  2. Start taking your rescue medicine:    every 20 minutes for up to 1 hour. Then every 4 hours for 24-48 hours.  3. If you stay in the Yellow Zone for more than 12-24 hours, contact your doctor.  4. If you do not return to the Green Zone in 12-24 hours or you get worse, start taking your oral steroid medicine if prescribed by your provider.           RED ZONE Medical Alert - Get Help  I have ANY of these:    I feel awful    Medicine is not helping    Breathing getting harder    Trouble walking or talking    Nose opens wide to breathe       1. Take your rescue medicine NOW  2. If your provider has prescribed an oral steroid  medicine, start taking it NOW  3. Call your doctor NOW  4. If you are still in the Red Zone after 20 minutes and you have not reached your doctor:    Take your rescue medicine again and    Call 911 or go to the emergency room right away    See your regular doctor within 2 weeks of an Emergency Room or Urgent Care visit for follow-up treatment.          Annual Reminders:  Meet with Asthma Educator,  Flu Shot in the Fall, consider Pneumonia Vaccination for patients with asthma (aged 19 and older).    Pharmacy: [id number] [id number] AT [facility] [address]    Electronically signed by Abundio Joaquin, DO   Date: 03/11/20                      Asthma Triggers  How To Control Things That Make Your Asthma Worse    Triggers are things that make your asthma worse.  Look at the list below to help you find your triggers and what you can do about them.  You can help prevent asthma flare-ups by staying away from your triggers.      Trigger                                                          What you can do   Cigarette Smoke  Tobacco smoke can make asthma worse. Do not allow smoking in your home, car or around you.  Be sure no one smokes at a child s day care or school.  If you smoke, ask your health care provider for ways to help you quit.  Ask family members to quit too.  Ask your health care provider for a referral to Quit Plan to help you quit smoking, or call 4-856-993-PLAN.     Colds, Flu, Bronchitis  These are common triggers of asthma. Wash your hands often.  Don t touch your eyes, nose or mouth.  Get a flu shot every year.     Dust Mites  These are tiny bugs that live in cloth or carpet. They are too small to see. Wash sheets and blankets in hot water every week.   Encase pillows and mattress in dust mite proof covers.  Avoid having carpet if you can. If you have carpet, vacuum weekly.   Use a dust mask and HEPA vacuum.   Pollen and Outdoor Mold  Some people are allergic  to trees, grass, or weed pollen, or molds. Try to keep your windows closed.  Limit time out doors when pollen count is high.   Ask you health care provider about taking medicine during allergy season.     Animal Dander  Some people are allergic to skin flakes, urine or saliva from pets with fur or feathers. Keep pets with fur or feathers out of your home.    If you can t keep the pet outdoors, then keep the pet out of your bedroom.  Keep the bedroom door closed.  Keep pets off cloth furniture and away from stuffed toys.     Mice, Rats, and Cockroaches   Some people are allergic to the waste from these pests.   Cover food and garbage.  Clean up spills and food crumbs.  Store grease in the refrigerator.   Keep food out of the bedroom.   Indoor Mold  This can be a trigger if your home has high moisture. Fix leaking faucets, pipes, or other sources of water.   Clean moldy surfaces.  Dehumidify basement if it is damp and smelly.   Smoke, Strong Odors, and Sprays  These can reduce air quality. Stay away from strong odors and sprays, such as perfume, powder, hair spray, paints, smoke incense, paint, cleaning products, candles and new carpet.   Exercise or Sports  Some people with asthma have this trigger. Be active!  Ask your doctor about taking medicine before sports or exercise to prevent symptoms.    Warm up for 5-10 minutes before and after sports or exercise.     Other Triggers of Asthma  Cold air:  Cover your nose and mouth with a scarf.  Sometimes laughing or crying can be a trigger.  Some medicines and food can trigger asthma.

## 2020-03-11 NOTE — LETTER
3/11/2020         RE: Heather Wilkinson  [address] [address] [facility] [place] [address]        Dear Colleague,    Thank you for referring your patient, Heather Wilkinson, to the [facility]. Please see a copy of my visit note below.    Heather Wilkinson is a 64 year old White female with previous medical history significant for asthma, allergic rhinitis, food allergy, venom allergy. Heather Wilkinson is being seen today for evaluation of allergies to food, asthma and seasonal allergies.     Patient has had asthma since childhood.  She has been on Symbicort 160/4.5 mcg 2 puffs inhaled twice daily.  Asthma is typically well controlled on Symbicort.  Triggers for asthma include cold air, URI, exercise, allergies, cats, dogs, mowing grass and raking leaves.  Previously she was on Advair.  Better controlled on Symbicort and Advair.  She has been on Singulair in the past.  She is no longer using.  She has been on allergy shots but discontinued secondary to systemic reactions.  She has allergic rhinoconjunctivitis and was most recently tested 10 to 12 years ago.  Perennial symptoms to get worse spring through fall.  Rhinorrhea, congestion, ocular watering, and ocular itching and postnasal drainage.  She will use diphenhydramine and Claritin-D.  She has been on Flonase.  She did not find Flonase to be beneficial.    Patient reports that with avocado she will have tingling of her lips.  With bananas she will develop lip swelling, vomiting and tightness in throat.  With shellfish she will develop throat swelling.  She reports that with latex exposure she will have cutaneous rash development.  No systemic symptoms with latex.  With insect stings and in childhood she has developed shortness of breath, wheezing, tightness in throat, angioedema and hives.    ENVIRONMENTAL HISTORY: [place] The home is heated with a forced air. They do have central air conditioning. The  patient's bedroom is furnished with carpeting in bedroom.  Pets inside the house include 1 dog(s). There is no history of cockroach or mice infestation. There is/are 0 smokers in the house.  The house does not have a damp basement.          ACT Total Scores 3/11/2020   ACT TOTAL SCORE (Goal Greater than or Equal to 20) 21   In the past 12 months, how many times did you visit the emergency room for your asthma without being admitted to the hospital? 0   In the past 12 months, how many times were you hospitalized overnight because of your asthma? 0         Past Medical History:   Diagnosis Date     Allergic rhinitis      Asthma      Atrial fibrillation (H)      Cough      Fluid retention      Tachycardia      Family History   Problem Relation Age of Onset     Glaucoma No family hx of      Macular Degeneration No family hx of      Past Surgical History:   Procedure Laterality Date     CYSTOSCOPY  5/11/2012    Procedure:CYSTOSCOPY; Surgeon:RADHA COMER; Location:[facility]     GYN SURGERY      csection, infertility, hyst     HYSTERECTOMY  1992     HYSTERECTOMY, PAP NO LONGER INDICATED       SLING TRANSVAGINAL  5/11/2012    Procedure:SLING TRANSVAGINAL; mini arc sling  and cystoscopy; Surgeon:RADHA COMER; Location:[facility]         Patient supplied answers from flow sheet for:  Allergy and Asthma Intake Questionnaire.  Allergy and Asthma Questionnaire Social History  Family Doctor: : Dr Vila  Did He or She suggest you see us?: No  Clinic:: [facility]  Check the reasons for your appointment: Asthma or cough  Tell us about your home: House, Dogs, Bedroom Carpet[place], Air conditioning, Room air   Job:: -[employer]    Allergy Testing  Have you been Tested for Allergies? : Yes  If yes, when?: 2012  At what clinic?: Dr Ld Kearns       Asthma or Cough  Check any symptoms you have:: Wheeze, Shortness of breath, Productive cough  (cough with mucus)  Have you been diagnosed with asthma? : Yes  Age: : 6  Did you have symptoms as a child?: Yes  Age symptoms started: : 3  Have you ever stayed in the hospital for asthma?: Yes  How many times: : 12  Have you ever gone to the ER for asthma?: Yes  How many times:: 4  Have you used prednisone (a steroid pill) for asthma in the past 12 months?: Yes  How many times:: 99  Do you have heartburn?: No  Did symptoms cause you to miss school or work in the past year?: No  How many days:: 0  Have you used inhalers for symptoms?: Yes  Names:: Albuterol;Symbicort;Pro air;Advair  When do asthma or cough symptoms occur? : All year    Did you have symptoms as a child?: Yes        REVIEW OF SYSTEMS:  General: negative for weight gain. negative for weight loss. negative for changes in sleep.   Ears: negative for fullness. negative for hearing loss. negative for dizziness.   Nose: negative for snoring.negative for changes in smell. negative for drainage.   Eyes: negative for eye watering. negative for eye itching. negative for vision changes. negative for eye redness.  Throat: negative for hoarseness. negative for sore throat. negative for trouble swallowing.   Lungs: positive  for shortness of breath.positive  for wheezing. positive  for sputum production.   Cardiovascular: negative for chest pain. negative for swelling of ankles. negative for fast or irregular heartbeat.   Gastrointestinal: negative for nausea. negative for heartburn. negative for acid reflux.   Musculoskeletal: negative for joint pain. negative for joint stiffness. negative for joint swelling.   Neurologic: negative for seizures. negative for fainting. negative for weakness.   Psychiatric: negative for changes in mood. negative for anxiety.   Endocrine: negative for cold intolerance. negative for heat intolerance. negative for tremors.   Lymphatic: negative for lower extremity swelling. negative for lymph node swelling.   Hematologic: negative for  easy bruising. negative for easy bleeding.  Integumentary: negative for rash. negative for scaling. negative for nail changes.       Current Outpatient Medications:      acyclovir (ZOVIRAX) 400 MG tablet, Take 1 tablet (400 mg) by mouth 2 times daily, Disp: 60 tablet, Rfl: 11     acyclovir (ZOVIRAX) 400 MG tablet, Take 1 tablet (400 mg) by mouth 2 times daily  Please keep appointment on 12/19/19, Disp: 60 tablet, Rfl: 0     albuterol (ACCUNEB) 1.25 MG/3ML neb solution, Take 2 vials (2.5 mg) by nebulization every 4 hours as needed for shortness of breath / dyspnea or wheezing, Disp: 30 vial, Rfl: 4     albuterol (PROVENTIL HFA: VENTOLIN HFA) 108 (90 BASE) MCG/ACT inhaler, Inhale 2 puffs into the lungs every 6 hours., Disp: , Rfl:      budesonide-formoterol (SYMBICORT) 160-4.5 MCG/ACT Inhaler, Inhale 2 puffs into the lungs 2 times daily, Disp: 1 Inhaler, Rfl: 11     budesonide-formoterol (SYMBICORT) 160-4.5 MCG/ACT inhaler, Inhale 2 puffs into the lungs 2 times daily, Disp: , Rfl:      Cholecalciferol (VITAMIN D-3 PO), Take 1 tablet by mouth, Disp: , Rfl:      ciprofloxacin (CIPRO) 500 MG tablet, Take 1 tablet (500 mg) by mouth 2 times daily, Disp: 14 tablet, Rfl: 0     CLARITIN-D 24 HOUR  MG per 24 hr tablet, TK 1 T PO  D, Disp: , Rfl: 0     dexamethasone (DECADRON) 0.1 % ophthalmic solution, Place 1 drop into the right eye every other day, Disp: 5 mL, Rfl: 6     EPINEPHrine (AUVI-Q) 0.3 MG/0.3ML injection 2-pack, Inject 0.3 mLs (0.3 mg) into the muscle as needed for anaphylaxis, Disp: 2 each, Rfl: 1     erythromycin (ROMYCIN) 5 MG/GM ophthalmic ointment, 1 application to each eye at bedtime, Disp: 1 Tube, Rfl: 11     glucagon 1 MG kit, Inject 1mg as needed for anaphylaxis that is non-responding to epinephrine., Disp: 1 mg, Rfl: 0     Hypromellose (ARTIFICIAL TEARS OP), Apply 1 drop to eye as needed Usually every 30 minutes., Disp: , Rfl:      metoprolol tartrate (LOPRESSOR) 25 MG tablet, , Disp: , Rfl:       montelukast (SINGULAIR) 10 MG tablet, Take 1 tablet (10 mg) by mouth At Bedtime, Disp: 30 tablet, Rfl: 11     nystatin (MYCOSTATIN) 724099 UNIT/ML suspension, TAKE 5MLS BY MOUTH FOUR TIMES DAILY, Disp: , Rfl:      phenazopyridine (PYRIDIUM) 100 MG tablet, Take 2 tablets (200 mg) by mouth 3 times daily as needed, Disp: 20 tablet, Rfl: 1     predniSONE (DELTASONE) 10 MG tablet, Take 3 tablets (30 mg) by mouth 2 times daily for 5 days, Disp: 30 tablet, Rfl: 0     solifenacin (VESICARE) 10 MG tablet, TAKE 1 TABLET BY MOUTH DAILY, Disp: 90 tablet, Rfl: 1     triamterene-HCTZ (DYAZIDE) 37.5-25 MG capsule, Take 2 capsules by mouth as needed for fluid retention, Disp: 180 capsule, Rfl: 0  No current facility-administered medications for this visit.   Immunization History   Administered Date(s) Administered     DTaP, Unspecified 09/05/2014     Pneumococcal 23 valent 10/16/2013     TD (ADULT, 7+) 04/01/2003     Allergies   Allergen Reactions     Latex Anaphylaxis     Xray Dye  [Contrast Dye] Hives     Pt states she had allergic reaction to xray day several years ago (hives) Pt has been successfully premedicated for contrast allergy without problem.      Latex      Sulfa Drugs      Penicillins Rash     Other reaction(s): Swelling, lips/tongue  Childhood rash         EXAM:   Constitutional:  Appears well-developed and well-nourished. No distress.   HEENT:   Head: Normocephalic.   No cobblestoning of posterior oropharynx.   Nasal tissue pink and normal appearing.  No rhinorrhea noted.    Eyes: Conjunctivae are non-erythematous   No maxillary or frontal sinus tenderness to palpation.   Cardiovascular: Normal rate, regular rhythm and normal heart sounds. Exam reveals no gallop and no friction rub.   No murmur heard.  Respiratory: Effort normal and breath sounds normal. No respiratory distress. No wheezes. No rales.   Musculoskeletal: Normal range of motion.   Neuro: Oriented to person, place, and time.  Skin: Skin is warm and dry.  No rash noted.   Psychiatric: Normal mood and affect.     Nursing note and vitals reviewed.      WORKUP:   Spirometry-pre  FVC % pred:103  FEV1 % pred:74  FEV1/FVC % act:55    Mild obstruction    Spirometry-post  FVC % pred:89  FEV1 % pred:72  FEV1/FVC % act:63    No significant change in FEV1.  Patient likely at baseline.          ASSESSMENT/PLAN:  Problem List Items Addressed This Visit        Respiratory    Moderate persistent asthma without complication - Primary     Moderate persistent asthma.  Well-controlled on Symbicort 160/4.5 mcg 2 puff inhaled twice daily.  Flares with URI, exercise, allergies, cats, dogs, cold air, mowing grass, raking leaves.  Previously on Advair.  Symbicort resulted in better control.  Additionally she was on Singulair.    Spirometry with mild obstruction.  No significant improvement postbronchodilator.  This is likely her baseline FEV1.  ACT 21    -Asthma action plan provided and reviewed with the patient.  -Symbicort 160/4.5 mcg 2 puff inhaled twice daily.  -Singulair 10 mg by mouth daily.  -Albuterol 2-4 puffs inhaled (use a spacer unless using a Proair Respiclick device) every 4 hours as needed for chest tightness, wheezing, shortness of breath and/or coughing.   -Total IgE.  -CBC.  -Repeating allergy evaluation as noted.           Relevant Medications    albuterol (PROVENTIL) neb solution 2.5 mg (Completed)    budesonide-formoterol (SYMBICORT) 160-4.5 MCG/ACT Inhaler    montelukast (SINGULAIR) 10 MG tablet    predniSONE (DELTASONE) 10 MG tablet    albuterol (ACCUNEB) 1.25 MG/3ML neb solution    Other Relevant Orders    Spirometry, Breathing Capacity (Completed)    Allergen honeybee IgE (Completed)    Allergen paper wasp IgE (Completed)    Allergen yellow hornet IgE (Completed)    Allergen whiteface hornet IgE (Completed)    Allergen common wasp IgE (Completed)    CBC with platelets differential (Completed)    IgE (Completed)    Allergen Feather mix (Completed)       Immune     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  She reports positive testing in the past.  No longer caring injectable epinephrine.    - Serum IgE for venom.  -Avoid venom insect stings.  - An anaphylaxis action plan was given and reviewed with patient and family.   - Injectable epinephrine use was reviewed and demonstrated. The patient will need to carry injectable epinephrine.   - Injectable epinephrine script provided.            Relevant Medications    albuterol (PROVENTIL) neb solution 2.5 mg (Completed)    budesonide-formoterol (SYMBICORT) 160-4.5 MCG/ACT Inhaler    montelukast (SINGULAIR) 10 MG tablet    albuterol (ACCUNEB) 1.25 MG/3ML neb solution       Infectious/Inflammatory    Rhinoconjunctivitis     History of allergic rhinoconjunctivitis.  Has been on allergen immunotherapy in the past.  Not recently.  Stopped immunotherapy secondary to systemic reactions.  She reports that she was allergic to everything.  I do not have these results.    -Serum IgE for environmental allergens.  -Claritin-D as needed.  - Singulair 10 mg by mouth daily.  -She had been on Flonase in the past and found not to be beneficial.         Relevant Medications    montelukast (SINGULAIR) 10 MG tablet    Other Relevant Orders    Allergen cat epithellium IgE (Completed)    Allergen dog epithelium IgE (Completed)    Allergen lauren IgE (Completed)    Allergen D pteronyssinus IgE (Completed)    Allergen D farinae IgE (Completed)    Allergen alternaria alternata IgE (Completed)    Allergen aspergillus fumigatus IgE (Completed)    Allergen cladosporium herbarum IgE (Completed)    Allergen Epicoccum purpurascens IgE (Completed)    Allergen penicillium notatum IgE (Completed)    Allergen hima white IgE (Completed)    Allergen cottonwood IgE (Completed)    Allergen elm IgE (Completed)    Allergen maple box elder IgE (Completed)    Allergen oak white IgE (Completed)    Allergen silver  birch  IgE (Completed)    Allergen Lamoni Tree (Completed)    Allergen giant ragweed IgE (Completed)    Allergen English plantain IgE (Completed)    Allergen ragweed short IgE (Completed)    Allergen Mugwort IgE (Completed)    Allergen Sheep Sorrel IgE (Completed)    Allergen Feather mix (Completed)       Other    Food allergy     Symptoms with avocado, banana, latex.    -Serum IgE for avocado, banana and latex.  - Anaphylaxis action plan was provided.  - Prescription for epinephrine was provided.  -She is on beta-blocker.  Discussed with patient that beta-blocker could interfere with epinephrine.  Prescription for glucagon was provided.  Instructed when and how to use.         Relevant Medications    albuterol (PROVENTIL) neb solution 2.5 mg (Completed)    budesonide-formoterol (SYMBICORT) 160-4.5 MCG/ACT Inhaler    montelukast (SINGULAIR) 10 MG tablet    glucagon 1 MG kit    EPINEPHrine (AUVI-Q) 0.3 MG/0.3ML injection 2-pack    albuterol (ACCUNEB) 1.25 MG/3ML neb solution    Other Relevant Orders    Allergen banana IgE (Completed)    Allergen latex IgE (Completed)    Allergen Avocado IgE (Completed)    Allergen lobster IgE (Completed)    Allergen crab IgE (Completed)    Allergen shrimp IgE (Completed)          Chart documentation with Dragon Voice recognition Software. Although reviewed after completion, some words and grammatical errors may remain.    Abundio Joaquin DO FAAAAI  Allergy/Immunology  [place], [place]      Again, thank you for allowing me to participate in the care of your patient.        Sincerely,        Abundio Joaquin DO

## 2020-03-11 NOTE — ASSESSMENT & PLAN NOTE
Moderate persistent asthma.  Well-controlled on Symbicort 160/4.5 mcg 2 puff inhaled twice daily.  Flares with URI, exercise, allergies, cats, dogs, cold air, mowing grass, raking leaves.  Previously on Advair.  Symbicort resulted in better control.  Additionally she was on Singulair.    Spirometry with mild obstruction.  No significant improvement postbronchodilator.  This is likely her baseline FEV1.  ACT 21    -Asthma action plan provided and reviewed with the patient.  -Symbicort 160/4.5 mcg 2 puff inhaled twice daily.  -Singulair 10 mg by mouth daily.  -Albuterol 2-4 puffs inhaled (use a spacer unless using a Proair Respiclick device) every 4 hours as needed for chest tightness, wheezing, shortness of breath and/or coughing.   -Total IgE.  -CBC.  -Repeating allergy evaluation as noted.

## 2020-03-11 NOTE — PATIENT INSTRUCTIONS
Allergy Staff Appt Hours Shot Hours Locations    Physician     Abundio Joaquin DO       Support Staff     MAURY Yanes, [facility]  Tuesday:        [place] 7-4:20     Wednesday:        [place]: 7-5 Thursday:                    [place] 7-6:40     Friday:  [place]  7-2:40   [place]        Thursday: 1-5:50        Friday: 7-10:50     [place]        Tuesday: 7- 3:20        Wednesday: 7-4:20     [place] Monday: 7-4:20        Tuesday: 1-6:20         [facility]  [address] [place] [address]  Appt Line: [phone]  Allergy RN:  [phone]    [facility]  [address] [place] [address]  Appt Line: [phone]  Allergy RN:  [phone]       Important Scheduling Information  Aspirin Desensitization: Appt will last 2 clinic days. Please call the Allergy RN line for your clinic to schedule. Discontinue antihistamines 7 days prior to the appointment.     Food Challenges: Appt will last 3-4 hours. Please call the Allergy RN line for your clinic to schedule. Discontinue antihistamines 7 days prior to the appointment.     Penicillin Testing: Appt will last 2-3 hours. Please call the Allergy RN line for your clinic to schedule. Discontinue antihistamines 7 days prior to the appointment.     Skin Testing: Appt will about 40 minutes. Call the appointment line for your clinic to schedule. Discontinue antihistamines 7 days prior to the appointment.     Venom Testing: Appt will last 2-3 hours. Please call the Allergy RN line for your clinic to schedule. Discontinue antihistamines 7 days prior to the appointment.     Thank you for trusting us with your Allergy, Asthma, and Immunology care. Please feel free to contact us with any questions or concerns you may have.      - Symbicort 160/4.5mcg 2 puffs inhaled twice daily with a spacer.   - Singulair 10mg by mouth daily at night.   - Claritin-D as needed.   - Discuss with cardiology/primary about stopping metoprolol.   - Blood work  today.   - See anaphylaxis action plan.   - See asthma action plan.

## 2020-03-11 NOTE — ASSESSMENT & PLAN NOTE
Symptoms with avocado, banana, latex.    -Serum IgE for avocado, banana and latex.  - Anaphylaxis action plan was provided.  - Prescription for epinephrine was provided.  -She is on beta-blocker.  Discussed with patient that beta-blocker could interfere with epinephrine.  Prescription for glucagon was provided.  Instructed when and how to use.

## 2020-03-11 NOTE — ASSESSMENT & PLAN NOTE
Anaphylaxis secondary to  stinging insect years ago.  She reports positive testing in the past.  No longer caring injectable epinephrine.    - Serum IgE for venom.  -Avoid venom insect stings.  - An anaphylaxis action plan was given and reviewed with patient and family.   - Injectable epinephrine use was reviewed and demonstrated. The patient will need to carry injectable epinephrine.   - Injectable epinephrine script provided.

## 2020-03-11 NOTE — NURSING NOTE
The following nebulizer treatment was given:     MEDICATION: Albuterol Sulfate 2.5 mg  : [employer]  LOT #: [id number]  EXPIRATION DATE:  09/30/2021  NDC # 7253-2253-43      Mallika Boyer MA

## 2020-03-12 ENCOUNTER — MYC MEDICAL ADVICE (OUTPATIENT)
Dept: ALLERGY | Facility: OTHER | Age: 65
End: 2020-03-12

## 2020-03-12 ASSESSMENT — ASTHMA QUESTIONNAIRES: ACT_TOTALSCORE: 21

## 2020-03-12 NOTE — TELEPHONE ENCOUNTER
Waiting for lab results still. Pt message sent that we will be in contact with her once we have all the results. Please see patient message regarding CBC, any concerns with the results.      Mallika Boyer MA

## 2020-03-13 LAB
A ALTERNATA IGE QN: <0.1 KU(A)/L
A FUMIGATUS IGE QN: 0.23 KU(A)/L
BANANA IGE QN: <0.1 KU(A)/L
C HERBARUM IGE QN: <0.1 KU(A)/L
CAT DANDER IGG QN: 4.44 KU(A)/L
COMMON RAGWEED IGE QN: 0.29 KU(A)/L
COTTONWOOD IGE QN: <0.1 KU(A)/L
CRAB IGE QN: <0.1 KU(A)/L
D FARINAE IGE QN: <0.1 KU(A)/L
D PTERONYSS IGE QN: <0.1 KU(A)/L
DOG DANDER+EPITH IGE QN: 52.3 KU(A)/L
E PURPURASCENS IGE QN: <0.1 KU(A)/L
ENGL PLANTAIN IGE QN: <0.1 KU(A)/L
GIANT RAGWEED IGE QN: 0.19 KU(A)/L
HONEY BEE IGE QN: <0.1 KU(A)/L
IGE SERPL-ACNC: 330 KIU/L (ref 0–114)
LOBSTER IGE QN: <0.1 KU(A)/L
LTX IGE QN: 1.09 KU(A)/L
MAPLE IGE QN: <0.1 KU(A)/L
MUGWORT IGE QN: <0.1 KU(A)/L
P NOTATUM IGE QN: 0.14 KU(A)/L
PAPER WASP IGE QN: <0.1 KU(A)/L
SHEEP SORREL IGE QN: <0.1 KU(A)/L
SHRIMP IGE QN: <0.1 KU(A)/L
SILVER BIRCH IGE QN: <0.1 KU(A)/L
TIMOTHY IGE QN: 1.09 KU(A)/L
WHITE ASH IGE QN: <0.1 KU(A)/L
WHITE ELM IGE QN: <0.1 KU(A)/L
WHITE OAK IGE QN: <0.1 KU(A)/L
WHITEFACED HORNET IGE QN: <0.1 KU(A)/L
YELLOW HORNET IGE QN: <0.1 KU(A)/L
YELLOW JACKET IGE QN: <0.1 KU(A)/L

## 2020-03-14 LAB
AVOCADO IGE QN: 0.13 KU/L
CALIF WALNUT IGE QN: <0.1 KU/L
DEPRECATED MISC ALLERGEN IGE RAST QL: NORMAL
EPID ALLERG MIX73 IGE QN: NEGATIVE KU/L

## 2020-03-14 NOTE — RESULT ENCOUNTER NOTE
Testing positive for dog, grass, molds, weeds, cat and latex. All else negative. See avoidance measures noted below. Continue to avoid latex. Avocado and banana test were normal. We could do skin testing with fresh avocado and banana. Continue to carry epipen for latex and avoid avocado and banana for now. This easily could be oral allergy syndrome with banana and latex which is cross reactivity with pollen as opposed to true food allergy. Additionally venom skin testing negative. You may have outgrown. Additionally would need skin testing before cleared from venom allergy. This is on backorder. We will add to list and arrange when we can. I will have nurse call to discuss but will not be until Tuesday. Thanks.     Dr. Joaquin

## 2020-03-17 ENCOUNTER — TELEPHONE (OUTPATIENT)
Dept: ALLERGY | Facility: OTHER | Age: 65
End: 2020-03-17

## 2020-03-17 ENCOUNTER — MYC MEDICAL ADVICE (OUTPATIENT)
Dept: ALLERGY | Facility: OTHER | Age: 65
End: 2020-03-17

## 2020-03-17 DIAGNOSIS — J45.40 MODERATE PERSISTENT ASTHMA WITHOUT COMPLICATION: Primary | ICD-10-CM

## 2020-03-17 NOTE — TELEPHONE ENCOUNTER
Central Prior Authorization Team   Phone: [phone]      Prior Authorization Not Needed per Insurance    03/17/2020  Medication: budesonide-formoterol (SYMBICORT) 160-4.5 MCG/ACT Inhaler - NOT NEEDED  Insurance Company: [employer] - Phone [phone] Fax [phone]  Expected CoPay:      Pharmacy Filling the Rx: [id number] [place], [place] - [address] AT [facility] [address]  Pharmacy Notified: Yes  Patient Notified: Yes (**Instructed pharmacy to notify patient when script is ready to /ship.**)    Pharmacy states pt was trying to receive BRAND with a coupon card but took the generic instead since it was covered.

## 2020-03-17 NOTE — TELEPHONE ENCOUNTER
Spoke with patient.  She would like new prescription for neb machine as hers is old and not working well.  Please print, thank you.    Allergy team: No need to call patient back, just mail rx.    Kerry Ibarra RN

## 2020-04-04 DIAGNOSIS — R60.9 EDEMA, UNSPECIFIED TYPE: ICD-10-CM

## 2020-04-06 RX ORDER — TRIAMTERENE AND HYDROCHLOROTHIAZIDE 37.5; 25 MG/1; MG/1
CAPSULE ORAL
Qty: 180 CAPSULE | Refills: 0 | Status: SHIPPED | OUTPATIENT
Start: 2020-04-06 | End: 2020-07-01

## 2020-04-06 NOTE — TELEPHONE ENCOUNTER
"Requested Prescriptions   Pending Prescriptions Disp Refills     triamterene-HCTZ (DYAZIDE) 37.5-25 MG capsule [Pharmacy Med Name: TRIAMTERENE 37.5MG/ HCTZ 25MG CAPS] 180 capsule 0     Sig: TAKE 2 CAPSULE BY MOUTH AS NEEDED FOR FLUID RETENTION       Diuretics (Including Combos) Protocol Failed - 4/4/2020 10:13 AM        Failed - Normal serum creatinine on file in past 12 months     Recent Labs   Lab Test 05/30/14   CR 0.74              Failed - Normal serum potassium on file in past 12 months     Recent Labs   Lab Test 05/30/14   POTASSIUM 3.6                    Failed - Normal serum sodium on file in past 12 months     No lab results found.           Passed - Blood pressure under 140/90 in past 12 months     BP Readings from Last 3 Encounters:   03/11/20 106/62   11/21/19 100/60   10/24/18 106/70                 Passed - Recent (12 mo) or future (30 days) visit within the authorizing provider's specialty     Patient has had an office visit with the authorizing provider or a provider within the authorizing providers department within the previous 12 mos or has a future within next 30 days. See \"Patient Info\" tab in inbasket, or \"Choose Columns\" in Meds & Orders section of the refill encounter.              Passed - Medication is active on med list        Passed - Patient is age 18 or older        Passed - No active pregancy on record        Passed - No positive pregnancy test in past 12 months         Refill sent  Alyssa Puga RN on 4/6/2020 at 4:26 AM      "

## 2020-06-29 ENCOUNTER — MYC MEDICAL ADVICE (OUTPATIENT)
Dept: OBGYN | Facility: CLINIC | Age: 65
End: 2020-06-29

## 2020-06-29 DIAGNOSIS — N95.1 SYMPTOMATIC MENOPAUSAL OR FEMALE CLIMACTERIC STATES: Primary | ICD-10-CM

## 2020-06-30 ENCOUNTER — MYC REFILL (OUTPATIENT)
Dept: OBGYN | Facility: CLINIC | Age: 65
End: 2020-06-30

## 2020-06-30 DIAGNOSIS — R60.9 EDEMA, UNSPECIFIED TYPE: ICD-10-CM

## 2020-07-01 DIAGNOSIS — R60.9 EDEMA, UNSPECIFIED TYPE: ICD-10-CM

## 2020-07-01 RX ORDER — TRIAMTERENE AND HYDROCHLOROTHIAZIDE 37.5; 25 MG/1; MG/1
CAPSULE ORAL
Qty: 180 CAPSULE | Refills: 0 | OUTPATIENT
Start: 2020-07-01

## 2020-07-01 RX ORDER — TRIAMTERENE AND HYDROCHLOROTHIAZIDE 37.5; 25 MG/1; MG/1
CAPSULE ORAL
Qty: 180 CAPSULE | Refills: 0 | Status: SHIPPED | OUTPATIENT
Start: 2020-07-01 | End: 2020-10-01

## 2020-07-01 NOTE — TELEPHONE ENCOUNTER
"Requested Prescriptions   Pending Prescriptions Disp Refills     triamterene-HCTZ (DYAZIDE) 37.5-25 MG capsule [Pharmacy Med Name: TRIAMTERENE 37.5MG/ HCTZ 25MG CAPS] 180 capsule 0     Sig: TAKE 2 CAPSULES BY MOUTH AS NEEDED FOR FLUID RETENTION       Diuretics (Including Combos) Protocol Failed - 7/1/2020  6:25 AM        Failed - Normal serum creatinine on file in past 12 months     Recent Labs   Lab Test 05/30/14   CR 0.74              Failed - Normal serum potassium on file in past 12 months     Recent Labs   Lab Test 05/30/14   POTASSIUM 3.6                    Failed - Normal serum sodium on file in past 12 months     No lab results found.           Passed - Blood pressure under 140/90 in past 12 months     BP Readings from Last 3 Encounters:   03/11/20 106/62   11/21/19 100/60   10/24/18 106/70                 Passed - Recent (12 mo) or future (30 days) visit within the authorizing provider's specialty     Patient has had an office visit with the authorizing provider or a provider within the authorizing providers department within the previous 12 mos or has a future within next 30 days. See \"Patient Info\" tab in inbasket, or \"Choose Columns\" in Meds & Orders section of the refill encounter.              Passed - Medication is active on med list        Passed - Patient is age 18 or older        Passed - No active pregancy on record        Passed - No positive pregnancy test in past 12 months         Refill sent  Alyssa Puga RN on 7/1/2020 at 8:24 AM      "

## 2020-07-01 NOTE — TELEPHONE ENCOUNTER
"Requested Prescriptions   Pending Prescriptions Disp Refills     triamterene-HCTZ (DYAZIDE) 37.5-25 MG capsule 180 capsule 0     Sig: TAKE 2 CAPSULE BY MOUTH AS NEEDED FOR FLUID RETENTION       Diuretics (Including Combos) Protocol Failed - 6/30/2020  6:59 PM        Failed - Normal serum creatinine on file in past 12 months     Recent Labs   Lab Test 05/30/14   CR 0.74              Failed - Normal serum potassium on file in past 12 months     Recent Labs   Lab Test 05/30/14   POTASSIUM 3.6                    Failed - Normal serum sodium on file in past 12 months     No lab results found.           Passed - Blood pressure under 140/90 in past 12 months     BP Readings from Last 3 Encounters:   03/11/20 106/62   11/21/19 100/60   10/24/18 106/70                 Passed - Recent (12 mo) or future (30 days) visit within the authorizing provider's specialty     Patient has had an office visit with the authorizing provider or a provider within the authorizing providers department within the previous 12 mos or has a future within next 30 days. See \"Patient Info\" tab in inbasket, or \"Choose Columns\" in Meds & Orders section of the refill encounter.              Passed - Medication is active on med list        Passed - Patient is age 18 or older        Passed - No active pregancy on record        Passed - No positive pregnancy test in past 12 months           Duplicate  Alyssa Puga RN on 7/1/2020 at 8:26 AM    "

## 2020-07-02 RX ORDER — ESTRADIOL 0.03 MG/D
1 FILM, EXTENDED RELEASE TRANSDERMAL
Qty: 24 PATCH | Refills: 1 | Status: SHIPPED | OUTPATIENT
Start: 2020-07-02 | End: 2020-12-07

## 2020-07-02 NOTE — TELEPHONE ENCOUNTER
Talked with patient she did not have a blood clot, it was a baker's cyst behind her knee and was treated.  Since stopping patch 2 years ago, she is gaining weight and has changed nothing as far as diet and exercise.  Wants to go back on patch again.  She has had a hysterectomy.  Discussed risks and benefits of HRT, patient understands and would like to proceed.  MAURY DennisC

## 2020-07-29 ENCOUNTER — VIRTUAL VISIT (OUTPATIENT)
Dept: ALLERGY | Facility: CLINIC | Age: 65
End: 2020-07-29
Payer: COMMERCIAL

## 2020-07-29 ENCOUNTER — MYC MEDICAL ADVICE (OUTPATIENT)
Dept: ALLERGY | Facility: OTHER | Age: 65
End: 2020-07-29

## 2020-07-29 VITALS — BODY MASS INDEX: 28.68 KG/M2 | HEIGHT: 66 IN

## 2020-07-29 DIAGNOSIS — J45.40 MODERATE PERSISTENT ASTHMA WITHOUT COMPLICATION: Primary | ICD-10-CM

## 2020-07-29 DIAGNOSIS — J30.1 SEASONAL ALLERGIC RHINITIS DUE TO POLLEN: ICD-10-CM

## 2020-07-29 DIAGNOSIS — J30.81 ALLERGIC RHINITIS DUE TO ANIMAL DANDER: ICD-10-CM

## 2020-07-29 DIAGNOSIS — B37.0 THRUSH: ICD-10-CM

## 2020-07-29 DIAGNOSIS — J30.89 ALLERGIC RHINITIS DUE TO MOLD: ICD-10-CM

## 2020-07-29 PROCEDURE — 99213 OFFICE O/P EST LOW 20 MIN: CPT | Mod: TEL | Performed by: ALLERGY & IMMUNOLOGY

## 2020-07-29 RX ORDER — BUDESONIDE AND FORMOTEROL FUMARATE DIHYDRATE 80; 4.5 UG/1; UG/1
2 AEROSOL RESPIRATORY (INHALATION) 2 TIMES DAILY
Qty: 1 INHALER | Refills: 3 | Status: SHIPPED | OUTPATIENT
Start: 2020-07-29 | End: 2020-12-02

## 2020-07-29 RX ORDER — FLUCONAZOLE 100 MG/1
100 TABLET ORAL DAILY
Qty: 15 TABLET | Refills: 0 | Status: SHIPPED | OUTPATIENT
Start: 2020-07-29 | End: 2020-08-13

## 2020-07-29 NOTE — LETTER
"    7/29/2020         RE: Heather Wilkinson  [address] [facility] [address]        Dear Colleague,    Thank you for referring your patient, Heather Wilkinson, to the [facility]. Please see a copy of my visit note below.    Haether Wilkinson is a 64 year old female who is being evaluated via a billable telephone visit.      The patient has been notified of following:     \"This telephone visit will be conducted via a call between you and your physician/provider. We have found that certain health care needs can be provided without the need for a physical exam.  This service lets us provide the care you need with a short phone conversation.  If a prescription is necessary, we can snd it directly to your pharmacy.  If lab work is needed, we can place an order for that and you can then stop by our lab to have the test done at a later time.     If during the course of the call the physician/provider feels a telephone visit is not appropriate, you will not be charged for this service.\"   Consent has been obtained for this service by 1 care team member: yes.     I have reviewed and updated the patient's Past Medical History, Social History, Family History and Medication List.    Treated thrush in June of 2020. This was helpful. Having white plaques on back of tongue, checks and hoarseness. This started on Saturday. She gets thrush several times yearly. Asthma has been under control. Asthma typically flares in the fall. Shortness of breath with wearing a mask up the steps. On Symbicort 160/4.5mcg 2 puffs twice daily.  Allergies controlled on Claritin-D. Stopped Singulair. She felt like made her tired.     Allergy testing positive for aspergillus, cat, dog, molds, grass, ragweed.     ACT Total Scores 7/29/2020   ACT TOTAL SCORE (Goal Greater than or Equal to 20) 23   In the past 12 months, how many times did you visit the emergency room for your asthma without being admitted to the hospital? 0   In the " past 12 months, how many times were you hospitalized overnight because of your asthma? 0           Past Medical History:   Diagnosis Date     Allergic rhinitis      Asthma      Atrial fibrillation (H)      Cough      Fluid retention      Tachycardia      Family History   Problem Relation Age of Onset     Glaucoma No family hx of      Macular Degeneration No family hx of      Past Surgical History:   Procedure Laterality Date     CYSTOSCOPY  5/11/2012    Procedure:CYSTOSCOPY; Surgeon:RADHA COMER; Location:[facility]     GYN SURGERY      csection, infertility, hyst     HYSTERECTOMY  1992     HYSTERECTOMY, PAP NO LONGER INDICATED       SLING TRANSVAGINAL  5/11/2012    Procedure:SLING TRANSVAGINAL; mini arc sling  and cystoscopy; Surgeon:RADHA COMER; Location:[facility]       REVIEW OF SYSTEMS:  General: negative for weight gain. negative for weight loss. negative for changes in sleep.   Ears: negative for fullness. negative for hearing loss. negative for dizziness.   Nose: negative for snoring.negative for changes in smell. negative for drainage.   Eyes: negative for eye watering. negative for eye itching. negative for vision changes. negative for eye redness.  Throat: negative for hoarseness. negative for sore throat. negative for trouble swallowing.   Lungs: negative for shortness of breath.negative for wheezing. negative for sputum production.   Cardiovascular: negative for chest pain. negative for swelling of ankles. negative for fast or irregular heartbeat.   Gastrointestinal: negative for nausea. negative for heartburn. negative for acid reflux.   Musculoskeletal: negative for joint pain. negative for joint stiffness. negative for joint swelling.   Neurologic: negative for seizures. negative for fainting. negative for weakness.   Psychiatric: negative for changes in mood. negative for anxiety.   Endocrine: negative for cold intolerance. negative for heat intolerance. negative for tremors.   Lymphatic:  negative for lower extremity swelling. negative for lymph node swelling.   Hematologic: negative for easy bruising. negative for easy bleeding.  Integumentary: negative for rash. negative for scaling. negative for nail changes.       Current Outpatient Medications:      acyclovir (ZOVIRAX) 400 MG tablet, Take 1 tablet (400 mg) by mouth 2 times daily, Disp: 60 tablet, Rfl: 11     acyclovir (ZOVIRAX) 400 MG tablet, Take 1 tablet (400 mg) by mouth 2 times daily  Please keep appointment on 12/19/19, Disp: 60 tablet, Rfl: 0     albuterol (ACCUNEB) 1.25 MG/3ML neb solution, Take 2 vials (2.5 mg) by nebulization every 4 hours as needed for shortness of breath / dyspnea or wheezing, Disp: 30 vial, Rfl: 4     albuterol (PROVENTIL HFA: VENTOLIN HFA) 108 (90 BASE) MCG/ACT inhaler, Inhale 2 puffs into the lungs every 6 hours., Disp: , Rfl:      budesonide-formoterol (SYMBICORT) 160-4.5 MCG/ACT Inhaler, Inhale 2 puffs into the lungs 2 times daily, Disp: 1 Inhaler, Rfl: 11     budesonide-formoterol (SYMBICORT) 160-4.5 MCG/ACT inhaler, Inhale 2 puffs into the lungs 2 times daily, Disp: , Rfl:      budesonide-formoterol (SYMBICORT) 80-4.5 MCG/ACT Inhaler, Inhale 2 puffs into the lungs 2 times daily, Disp: 1 Inhaler, Rfl: 3     Cholecalciferol (VITAMIN D-3 PO), Take 1 tablet by mouth, Disp: , Rfl:      ciprofloxacin (CIPRO) 500 MG tablet, Take 1 tablet (500 mg) by mouth 2 times daily, Disp: 14 tablet, Rfl: 0     CLARITIN-D 24 HOUR  MG per 24 hr tablet, TK 1 T PO  D, Disp: , Rfl: 0     dexamethasone (DECADRON) 0.1 % ophthalmic solution, Place 1 drop into the right eye every other day, Disp: 5 mL, Rfl: 6     EPINEPHrine (AUVI-Q) 0.3 MG/0.3ML injection 2-pack, Inject 0.3 mLs (0.3 mg) into the muscle as needed for anaphylaxis, Disp: 2 each, Rfl: 1     erythromycin (ROMYCIN) 5 MG/GM ophthalmic ointment, 1 application to each eye at bedtime, Disp: 1 Tube, Rfl: 11     estradiol (VIVELLE-DOT) 0.025 MG/24HR bi-weekly patch, Place 1  patch onto the skin twice a week, Disp: 24 patch, Rfl: 1     fluconazole (DIFLUCAN) 100 MG tablet, Take 1 tablet (100 mg) by mouth daily for 15 days, Disp: 15 tablet, Rfl: 0     glucagon 1 MG kit, Inject 1mg as needed for anaphylaxis that is non-responding to epinephrine., Disp: 1 mg, Rfl: 0     Hypromellose (ARTIFICIAL TEARS OP), Apply 1 drop to eye as needed Usually every 30 minutes., Disp: , Rfl:      metoprolol tartrate (LOPRESSOR) 25 MG tablet, , Disp: , Rfl:      montelukast (SINGULAIR) 10 MG tablet, Take 1 tablet (10 mg) by mouth At Bedtime, Disp: 30 tablet, Rfl: 11     nystatin (MYCOSTATIN) 133007 UNIT/ML suspension, TAKE 5MLS BY MOUTH FOUR TIMES DAILY, Disp: , Rfl:      order for DME, Equipment being ordered: Nebulizer, Disp: 1 Box, Rfl: 0     phenazopyridine (PYRIDIUM) 100 MG tablet, Take 2 tablets (200 mg) by mouth 3 times daily as needed, Disp: 20 tablet, Rfl: 1     solifenacin (VESICARE) 10 MG tablet, TAKE 1 TABLET BY MOUTH DAILY, Disp: 90 tablet, Rfl: 1     triamterene-HCTZ (DYAZIDE) 37.5-25 MG capsule, TAKE 2 CAPSULES BY MOUTH AS NEEDED FOR FLUID RETENTION, Disp: 180 capsule, Rfl: 0  Immunization History   Administered Date(s) Administered     DTaP, Unspecified 09/05/2014     Pneumococcal 23 valent 10/16/2013     TD (ADULT, 7+) 04/01/2003     Allergies   Allergen Reactions     Latex Anaphylaxis     Xray Dye  [Contrast Dye] Hives     Pt states she had allergic reaction to xray day several years ago (hives) Pt has been successfully premedicated for contrast allergy without problem.      Latex      Sulfa Drugs      Penicillins Rash     Other reaction(s): Swelling, lips/tongue  Childhood rash         ASSESSMENT/PLAN:  Problem List Items Addressed This Visit        Respiratory    Moderate persistent asthma without complication - Primary     Moderate persistent asthma.  Well-controlled on Symbicort 160/4.5 mcg 2 puff inhaled twice daily.  Flares with URI, exercise, allergies, cats, dogs, cold air, mowing  grass, raking leaves.  Previously on Advair.  Symbicort resulted in better control.  Additionally she was on Singulair.       ACT 23     -Asthma action plan provided and reviewed with the patient.  -Decrease Symbicort to 80/4.5mcg 2 puff bid. Well controlled asthma and getting recurrent thrush.   -Stop singulair 10 mg by mouth daily. Caused fatigue.   -Albuterol 2-4 puffs inhaled (use a spacer unless using a Proair Respiclick device) every 4 hours as needed for chest tightness, wheezing, shortness of breath and/or coughing.   -Total IgE elevated.   -absolute eosinophil count of 300  -Allergy testing positive for aspergillus, cat, dog, molds, grass, ragweed.          Relevant Medications    budesonide-formoterol (SYMBICORT) 80-4.5 MCG/ACT Inhaler    Seasonal allergic rhinitis due to pollen     History of allergic rhinoconjunctivitis.  Has been on allergen immunotherapy in the past.  Not recently.  Stopped immunotherapy secondary to systemic reactions.    Allergy testing positive for aspergillus, cat, dog, molds, grass, ragweed.      -Claritin-D as needed. Well controlled.            Relevant Medications    budesonide-formoterol (SYMBICORT) 80-4.5 MCG/ACT Inhaler    Allergic rhinitis due to mold    Relevant Medications    budesonide-formoterol (SYMBICORT) 80-4.5 MCG/ACT Inhaler    Allergic rhinitis due to animal dander    Relevant Medications    budesonide-formoterol (SYMBICORT) 80-4.5 MCG/ACT Inhaler       Infectious/Inflammatory    Thrush     Recurrent thrush. On inhaled steroid. Reducing dose.     -Diflucan 100mg daily for 15 days.          Relevant Medications    budesonide-formoterol (SYMBICORT) 80-4.5 MCG/ACT Inhaler    fluconazole (DIFLUCAN) 100 MG tablet        Return in 3 months.     Chart documentation with Dragon Voice recognition Software. Although reviewed after completion, some words and grammatical errors may remain.    I have reviewed the note as documented above.  This accurately captures the  substance of my conversation with the patient.    Phone call contact time  Call Started at 1441  Call Ended at 1452    Abundio Joaquin DO [facility]  Allergy/Immunology  [facility] [place]      Again, thank you for allowing me to participate in the care of your patient.        Sincerely,        Abundio Joaquin DO

## 2020-07-29 NOTE — ASSESSMENT & PLAN NOTE
Moderate persistent asthma.  Well-controlled on Symbicort 160/4.5 mcg 2 puff inhaled twice daily.  Flares with URI, exercise, allergies, cats, dogs, cold air, mowing grass, raking leaves.  Previously on Advair.  Symbicort resulted in better control.  Additionally she was on Singulair.       ACT 23     -Asthma action plan provided and reviewed with the patient.  -Decrease Symbicort to 80/4.5mcg 2 puff bid. Well controlled asthma and getting recurrent thrush.   -Stop singulair 10 mg by mouth daily. Caused fatigue.   -Albuterol 2-4 puffs inhaled (use a spacer unless using a Proair Respiclick device) every 4 hours as needed for chest tightness, wheezing, shortness of breath and/or coughing.   -Total IgE elevated.   -absolute eosinophil count of 300  -Allergy testing positive for aspergillus, cat, dog, molds, grass, ragweed.

## 2020-07-29 NOTE — PATIENT INSTRUCTIONS
Allergy Staff Appt Hours Shot Hours Locations    Physician     Abundio Joaquin DO       Support Staff     MAURY Yanes, KENN  Tuesday:        [place] 7-4:20     Wednesday:        [place]: 7-5     Thursday:                    [place] 7-6:40     Friday:  [place]  7-2:40   [place]        Thursday: 1-5:50        Friday: 7-10:50     [place]        Tuesday: 7- 3:20        Wednesday: 7-4:20     [place] Monday: 7-4:20        Tuesday: 1-6:20         [facility]  [address] [place] [address]  Appt Line: [phone]  Allergy RN:  [phone]    [facility]  [address] [place] [address]  Appt Line: [phone]  Allergy RN:  [phone]       Important Scheduling Information  Aspirin Desensitization: Appt will last 2 clinic days. Please call the Allergy RN line for your clinic to schedule. Discontinue antihistamines 7 days prior to the appointment.     Food Challenges: Appt will last 3-4 hours. Please call the Allergy RN line for your clinic to schedule. Discontinue antihistamines 7 days prior to the appointment.     Penicillin Testing: Appt will last 2-3 hours. Please call the Allergy RN line for your clinic to schedule. Discontinue antihistamines 7 days prior to the appointment.     Skin Testing: Appt will about 40 minutes. Call the appointment line for your clinic to schedule. Discontinue antihistamines 7 days prior to the appointment.     Venom Testing: Appt will last 2-3 hours. Please call the Allergy RN line for your clinic to schedule. Discontinue antihistamines 7 days prior to the appointment.     Thank you for trusting us with your Allergy, Asthma, and Immunology care. Please feel free to contact us with any questions or concerns you may have.

## 2020-07-29 NOTE — ASSESSMENT & PLAN NOTE
History of allergic rhinoconjunctivitis.  Has been on allergen immunotherapy in the past.  Not recently.  Stopped immunotherapy secondary to systemic reactions.    Allergy testing positive for aspergillus, cat, dog, molds, grass, ragweed.      -Claritin-D as needed. Well controlled.

## 2020-07-29 NOTE — PROGRESS NOTES
"Heather Wilkinson is a 64 year old female who is being evaluated via a billable telephone visit.      The patient has been notified of following:     \"This telephone visit will be conducted via a call between you and your physician/provider. We have found that certain health care needs can be provided without the need for a physical exam.  This service lets us provide the care you need with a short phone conversation.  If a prescription is necessary, we can snd it directly to your pharmacy.  If lab work is needed, we can place an order for that and you can then stop by our lab to have the test done at a later time.     If during the course of the call the physician/provider feels a telephone visit is not appropriate, you will not be charged for this service.\"   Consent has been obtained for this service by 1 care team member: yes.     I have reviewed and updated the patient's Past Medical History, Social History, Family History and Medication List.    Treated thrush in June of 2020. This was helpful. Having white plaques on back of tongue, checks and hoarseness. This started on Saturday. She gets thrush several times yearly. Asthma has been under control. Asthma typically flares in the fall. Shortness of breath with wearing a mask up the steps. On Symbicort 160/4.5mcg 2 puffs twice daily.  Allergies controlled on Claritin-D. Stopped Singulair. She felt like made her tired.     Allergy testing positive for aspergillus, cat, dog, molds, grass, ragweed.     ACT Total Scores 7/29/2020   ACT TOTAL SCORE (Goal Greater than or Equal to 20) 23   In the past 12 months, how many times did you visit the emergency room for your asthma without being admitted to the hospital? 0   In the past 12 months, how many times were you hospitalized overnight because of your asthma? 0           Past Medical History:   Diagnosis Date     Allergic rhinitis      Asthma      Atrial fibrillation (H)      Cough      Fluid retention      " Tachycardia      Family History   Problem Relation Age of Onset     Glaucoma No family hx of      Macular Degeneration No family hx of      Past Surgical History:   Procedure Laterality Date     CYSTOSCOPY  5/11/2012    Procedure:CYSTOSCOPY; Surgeon:RADHA COMER; Location:[facility]     GYN SURGERY      csection, infertility, hyst     HYSTERECTOMY  1992     HYSTERECTOMY, PAP NO LONGER INDICATED       SLING TRANSVAGINAL  5/11/2012    Procedure:SLING TRANSVAGINAL; mini arc sling  and cystoscopy; Surgeon:RADHA COMER; Location:[facility]       REVIEW OF SYSTEMS:  General: negative for weight gain. negative for weight loss. negative for changes in sleep.   Ears: negative for fullness. negative for hearing loss. negative for dizziness.   Nose: negative for snoring.negative for changes in smell. negative for drainage.   Eyes: negative for eye watering. negative for eye itching. negative for vision changes. negative for eye redness.  Throat: negative for hoarseness. negative for sore throat. negative for trouble swallowing.   Lungs: negative for shortness of breath.negative for wheezing. negative for sputum production.   Cardiovascular: negative for chest pain. negative for swelling of ankles. negative for fast or irregular heartbeat.   Gastrointestinal: negative for nausea. negative for heartburn. negative for acid reflux.   Musculoskeletal: negative for joint pain. negative for joint stiffness. negative for joint swelling.   Neurologic: negative for seizures. negative for fainting. negative for weakness.   Psychiatric: negative for changes in mood. negative for anxiety.   Endocrine: negative for cold intolerance. negative for heat intolerance. negative for tremors.   Lymphatic: negative for lower extremity swelling. negative for lymph node swelling.   Hematologic: negative for easy bruising. negative for easy bleeding.  Integumentary: negative for rash. negative for scaling. negative for nail changes.        Current Outpatient Medications:      acyclovir (ZOVIRAX) 400 MG tablet, Take 1 tablet (400 mg) by mouth 2 times daily, Disp: 60 tablet, Rfl: 11     acyclovir (ZOVIRAX) 400 MG tablet, Take 1 tablet (400 mg) by mouth 2 times daily  Please keep appointment on 12/19/19, Disp: 60 tablet, Rfl: 0     albuterol (ACCUNEB) 1.25 MG/3ML neb solution, Take 2 vials (2.5 mg) by nebulization every 4 hours as needed for shortness of breath / dyspnea or wheezing, Disp: 30 vial, Rfl: 4     albuterol (PROVENTIL HFA: VENTOLIN HFA) 108 (90 BASE) MCG/ACT inhaler, Inhale 2 puffs into the lungs every 6 hours., Disp: , Rfl:      budesonide-formoterol (SYMBICORT) 160-4.5 MCG/ACT Inhaler, Inhale 2 puffs into the lungs 2 times daily, Disp: 1 Inhaler, Rfl: 11     budesonide-formoterol (SYMBICORT) 160-4.5 MCG/ACT inhaler, Inhale 2 puffs into the lungs 2 times daily, Disp: , Rfl:      budesonide-formoterol (SYMBICORT) 80-4.5 MCG/ACT Inhaler, Inhale 2 puffs into the lungs 2 times daily, Disp: 1 Inhaler, Rfl: 3     Cholecalciferol (VITAMIN D-3 PO), Take 1 tablet by mouth, Disp: , Rfl:      ciprofloxacin (CIPRO) 500 MG tablet, Take 1 tablet (500 mg) by mouth 2 times daily, Disp: 14 tablet, Rfl: 0     CLARITIN-D 24 HOUR  MG per 24 hr tablet, TK 1 T PO  D, Disp: , Rfl: 0     dexamethasone (DECADRON) 0.1 % ophthalmic solution, Place 1 drop into the right eye every other day, Disp: 5 mL, Rfl: 6     EPINEPHrine (AUVI-Q) 0.3 MG/0.3ML injection 2-pack, Inject 0.3 mLs (0.3 mg) into the muscle as needed for anaphylaxis, Disp: 2 each, Rfl: 1     erythromycin (ROMYCIN) 5 MG/GM ophthalmic ointment, 1 application to each eye at bedtime, Disp: 1 Tube, Rfl: 11     estradiol (VIVELLE-DOT) 0.025 MG/24HR bi-weekly patch, Place 1 patch onto the skin twice a week, Disp: 24 patch, Rfl: 1     fluconazole (DIFLUCAN) 100 MG tablet, Take 1 tablet (100 mg) by mouth daily for 15 days, Disp: 15 tablet, Rfl: 0     glucagon 1 MG kit, Inject 1mg as needed for  anaphylaxis that is non-responding to epinephrine., Disp: 1 mg, Rfl: 0     Hypromellose (ARTIFICIAL TEARS OP), Apply 1 drop to eye as needed Usually every 30 minutes., Disp: , Rfl:      metoprolol tartrate (LOPRESSOR) 25 MG tablet, , Disp: , Rfl:      montelukast (SINGULAIR) 10 MG tablet, Take 1 tablet (10 mg) by mouth At Bedtime, Disp: 30 tablet, Rfl: 11     nystatin (MYCOSTATIN) 879061 UNIT/ML suspension, TAKE 5MLS BY MOUTH FOUR TIMES DAILY, Disp: , Rfl:      order for DME, Equipment being ordered: Nebulizer, Disp: 1 Box, Rfl: 0     phenazopyridine (PYRIDIUM) 100 MG tablet, Take 2 tablets (200 mg) by mouth 3 times daily as needed, Disp: 20 tablet, Rfl: 1     solifenacin (VESICARE) 10 MG tablet, TAKE 1 TABLET BY MOUTH DAILY, Disp: 90 tablet, Rfl: 1     triamterene-HCTZ (DYAZIDE) 37.5-25 MG capsule, TAKE 2 CAPSULES BY MOUTH AS NEEDED FOR FLUID RETENTION, Disp: 180 capsule, Rfl: 0  Immunization History   Administered Date(s) Administered     DTaP, Unspecified 09/05/2014     Pneumococcal 23 valent 10/16/2013     TD (ADULT, 7+) 04/01/2003     Allergies   Allergen Reactions     Latex Anaphylaxis     Xray Dye  [Contrast Dye] Hives     Pt states she had allergic reaction to xray day several years ago (hives) Pt has been successfully premedicated for contrast allergy without problem.      Latex      Sulfa Drugs      Penicillins Rash     Other reaction(s): Swelling, lips/tongue  Childhood rash         ASSESSMENT/PLAN:  Problem List Items Addressed This Visit        Respiratory    Moderate persistent asthma without complication - Primary     Moderate persistent asthma.  Well-controlled on Symbicort 160/4.5 mcg 2 puff inhaled twice daily.  Flares with URI, exercise, allergies, cats, dogs, cold air, mowing grass, raking leaves.  Previously on Advair.  Symbicort resulted in better control.  Additionally she was on Singulair.       ACT 23     -Asthma action plan provided and reviewed with the patient.  -Decrease Symbicort to  80/4.5mcg 2 puff bid. Well controlled asthma and getting recurrent thrush.   -Stop singulair 10 mg by mouth daily. Caused fatigue.   -Albuterol 2-4 puffs inhaled (use a spacer unless using a Proair Respiclick device) every 4 hours as needed for chest tightness, wheezing, shortness of breath and/or coughing.   -Total IgE elevated.   -absolute eosinophil count of 300  -Allergy testing positive for aspergillus, cat, dog, molds, grass, ragweed.          Relevant Medications    budesonide-formoterol (SYMBICORT) 80-4.5 MCG/ACT Inhaler    Seasonal allergic rhinitis due to pollen     History of allergic rhinoconjunctivitis.  Has been on allergen immunotherapy in the past.  Not recently.  Stopped immunotherapy secondary to systemic reactions.    Allergy testing positive for aspergillus, cat, dog, molds, grass, ragweed.      -Claritin-D as needed. Well controlled.            Relevant Medications    budesonide-formoterol (SYMBICORT) 80-4.5 MCG/ACT Inhaler    Allergic rhinitis due to mold    Relevant Medications    budesonide-formoterol (SYMBICORT) 80-4.5 MCG/ACT Inhaler    Allergic rhinitis due to animal dander    Relevant Medications    budesonide-formoterol (SYMBICORT) 80-4.5 MCG/ACT Inhaler       Infectious/Inflammatory    Thrush     Recurrent thrush. On inhaled steroid. Reducing dose.     -Diflucan 100mg daily for 15 days.          Relevant Medications    budesonide-formoterol (SYMBICORT) 80-4.5 MCG/ACT Inhaler    fluconazole (DIFLUCAN) 100 MG tablet        Return in 3 months.     Chart documentation with Dragon Voice recognition Software. Although reviewed after completion, some words and grammatical errors may remain.    I have reviewed the note as documented above.  This accurately captures the substance of my conversation with the patient.    Phone call contact time  Call Started at 1441  Call Ended at 1452    DO JORDI RoasrioAAI  Allergy/Immunology  [place], [place]

## 2020-07-30 ASSESSMENT — ASTHMA QUESTIONNAIRES: ACT_TOTALSCORE: 23

## 2020-09-08 RX ORDER — FLUCONAZOLE 100 MG/1
100 TABLET ORAL DAILY
Qty: 15 TABLET | Refills: 0 | OUTPATIENT
Start: 2020-09-08 | End: 2020-09-23

## 2020-09-08 NOTE — TELEPHONE ENCOUNTER
"Requested Prescriptions   Pending Prescriptions Disp Refills     fluconazole (DIFLUCAN) 100 MG tablet 15 tablet 0     Sig: Take 1 tablet (100 mg) by mouth daily for 15 days       Antifungal Agents Failed - 9/8/2020  2:16 PM        Failed - Not Fluconazole or Terconazole      If oral Fluconazole or Terconazole, may refill if indicated in progress notes.           Failed - Medication is active on med list        Passed - Recent (12 mo) or future (30 days) visit within the authorizing provider's specialty     Patient has had an office visit with the authorizing provider or a provider within the authorizing providers department within the previous 12 mos or has a future within next 30 days. See \"Patient Info\" tab in inbasket, or \"Choose Columns\" in Meds & Orders section of the refill encounter.                 Routing refill request to provider for review/approval because:  Drug not active on patient's medication list      Norma Nunez RN    "

## 2020-10-01 DIAGNOSIS — R60.9 EDEMA, UNSPECIFIED TYPE: ICD-10-CM

## 2020-10-01 RX ORDER — TRIAMTERENE AND HYDROCHLOROTHIAZIDE 37.5; 25 MG/1; MG/1
CAPSULE ORAL
Qty: 180 CAPSULE | Refills: 0 | Status: SHIPPED | OUTPATIENT
Start: 2020-10-01 | End: 2020-12-30

## 2020-10-14 ENCOUNTER — MYC MEDICAL ADVICE (OUTPATIENT)
Dept: OBGYN | Facility: CLINIC | Age: 65
End: 2020-10-14

## 2020-11-09 DIAGNOSIS — B37.0 THRUSH: ICD-10-CM

## 2020-11-09 RX ORDER — FLUCONAZOLE 100 MG/1
100 TABLET ORAL DAILY
Qty: 15 TABLET | Refills: 0 | OUTPATIENT
Start: 2020-11-09

## 2020-11-10 DIAGNOSIS — B37.0 THRUSH: ICD-10-CM

## 2020-11-10 RX ORDER — FLUCONAZOLE 100 MG/1
100 TABLET ORAL DAILY
Qty: 15 TABLET | Refills: 0 | OUTPATIENT
Start: 2020-11-10

## 2020-11-10 NOTE — TELEPHONE ENCOUNTER
Pending Prescriptions:                       Disp   Refills    fluconazole (DIFLUCAN) 100 MG tablet      15 tab*0            Sig: Take 1 tablet (100 mg) by mouth daily    Routing refill request to provider for review/approval because:  Drug not on the [facility] refill protocol     Kerry Ibarra RN

## 2020-11-16 ENCOUNTER — MYC MEDICAL ADVICE (OUTPATIENT)
Dept: ALLERGY | Facility: CLINIC | Age: 65
End: 2020-11-16

## 2020-11-16 DIAGNOSIS — J45.40 MODERATE PERSISTENT ASTHMA WITHOUT COMPLICATION: ICD-10-CM

## 2020-11-17 RX ORDER — BUDESONIDE AND FORMOTEROL FUMARATE DIHYDRATE 160; 4.5 UG/1; UG/1
2 AEROSOL RESPIRATORY (INHALATION) 2 TIMES DAILY
Qty: 1 INHALER | Refills: 3 | Status: SHIPPED | OUTPATIENT
Start: 2020-11-17 | End: 2021-04-06

## 2020-11-17 NOTE — TELEPHONE ENCOUNTER
Forwarding Tekmi message to Dr. Joaquin, thanks.  Please send increased dose of Symbicort and rx for thrush if appropriate.  Thanks.    Kerry Ibarra RN

## 2020-12-02 ENCOUNTER — VIRTUAL VISIT (OUTPATIENT)
Dept: ALLERGY | Facility: OTHER | Age: 65
End: 2020-12-02
Payer: COMMERCIAL

## 2020-12-02 VITALS — HEIGHT: 66 IN | BODY MASS INDEX: 27.32 KG/M2 | WEIGHT: 170 LBS

## 2020-12-02 DIAGNOSIS — B37.0 THRUSH: ICD-10-CM

## 2020-12-02 DIAGNOSIS — J45.40 MODERATE PERSISTENT ASTHMA WITHOUT COMPLICATION: Primary | ICD-10-CM

## 2020-12-02 DIAGNOSIS — J30.81 ALLERGIC RHINITIS DUE TO ANIMAL DANDER: ICD-10-CM

## 2020-12-02 DIAGNOSIS — J30.1 SEASONAL ALLERGIC RHINITIS DUE TO POLLEN: ICD-10-CM

## 2020-12-02 DIAGNOSIS — J30.89 ALLERGIC RHINITIS DUE TO MOLD: ICD-10-CM

## 2020-12-02 PROCEDURE — 99213 OFFICE O/P EST LOW 20 MIN: CPT | Mod: TEL | Performed by: ALLERGY & IMMUNOLOGY

## 2020-12-02 RX ORDER — FLUCONAZOLE 100 MG/1
100 TABLET ORAL DAILY
Qty: 15 TABLET | Refills: 0 | Status: SHIPPED | OUTPATIENT
Start: 2020-12-02 | End: 2020-12-17

## 2020-12-02 RX ORDER — BUDESONIDE AND FORMOTEROL FUMARATE DIHYDRATE 80; 4.5 UG/1; UG/1
2 AEROSOL RESPIRATORY (INHALATION) 2 TIMES DAILY
Qty: 1 INHALER | Refills: 3 | Status: SHIPPED | OUTPATIENT
Start: 2020-12-02 | End: 2021-01-26

## 2020-12-02 ASSESSMENT — MIFFLIN-ST. JEOR: SCORE: 1329.92

## 2020-12-02 NOTE — LETTER
"    12/2/2020         RE: Heather Wilkinson  [address] [place] [address]        Dear Colleague,    Thank you for referring your patient, Heather Wilkinson, to the [facility]. Please see a copy of my visit note below.    Heather Wilkinson is a 64 year old female who is being evaluated via a billable telephone visit.      The patient has been notified of following:     \"This telephone visit will be conducted via a call between you and your physician/provider. We have found that certain health care needs can be provided without the need for a physical exam.  This service lets us provide the care you need with a short phone conversation.  If a prescription is necessary, we can send it directly to your pharmacy.  If lab work is needed, we can place an order for that and you can then stop by our lab to have the test done at a later time.     If during the course of the call the physician/provider feels a telephone visit is not appropriate, you will not be charged for this service.\"   Consent has been obtained for this service by 1 care team member: yes.     I have reviewed and updated the patient's Past Medical History, Social History, Family History and Medication List.    Thrush is back. She has had issues with recurrent thrush. Treated with Nystatin liquid that she had since spring. This was helpful. Tends to get thrush in early winter. Asthma was been well controlled. The last 2 days she had wheezing in the morning. Wheezing lasted under 1 minute and resolved. On Symbicort 160/4.5mcg 2 puffs twice daily. Otherwise asthma has been controlled. Possibly minimal shortness of breath with climbing up steps. She climbs 27 steps to her office. She had previously tried tapering Symbicort from 160 to 80mcg and had increased symptoms. Went back to 160mcg and symptoms resolved. No use of Spiriva.     Having congestion. No other symptoms. Claritin-D is nearly 100% effective.     Allergy testing " positive for aspergillus, cat, dog, molds, grass, ragweed.        ACT Total Scores 12/2/2020   ACT TOTAL SCORE (Goal Greater than or Equal to 20) 21   In the past 12 months, how many times did you visit the emergency room for your asthma without being admitted to the hospital? 0   In the past 12 months, how many times were you hospitalized overnight because of your asthma? 0            Past Medical History:   Diagnosis Date     Allergic rhinitis      Asthma      Atrial fibrillation (H)      Cough      Fluid retention      Tachycardia      Family History   Problem Relation Age of Onset     Glaucoma No family hx of      Macular Degeneration No family hx of      Past Surgical History:   Procedure Laterality Date     CYSTOSCOPY  5/11/2012    Procedure:CYSTOSCOPY; Surgeon:RADHA COMER; Location:[facility]     GYN SURGERY      csection, infertility, hyst     HYSTERECTOMY  1992     HYSTERECTOMY, PAP NO LONGER INDICATED       SLING TRANSVAGINAL  5/11/2012    Procedure:SLING TRANSVAGINAL; mini arc sling  and cystoscopy; Surgeon:RADHA COMER; Location:[facility]       REVIEW OF SYSTEMS:  General: negative for weight gain. negative for weight loss. negative for changes in sleep.   Ears: negative for fullness. negative for hearing loss. negative for dizziness.   Nose: negative for snoring.negative for changes in smell. negative for drainage.   Eyes: negative for eye watering. negative for eye itching. negative for vision changes. negative for eye redness.  Throat: negative for hoarseness. negative for sore throat. negative for trouble swallowing.   Lungs: negative for shortness of breath.positive  for wheezing. negative for sputum production.   Cardiovascular: negative for chest pain. negative for swelling of ankles. negative for fast or irregular heartbeat.   Gastrointestinal: negative for nausea. negative for heartburn. negative for acid reflux.   Musculoskeletal: negative for joint pain. negative for joint stiffness.  negative for joint swelling.   Neurologic: negative for seizures. negative for fainting. negative for weakness.   Psychiatric: negative for changes in mood. negative for anxiety.   Endocrine: negative for cold intolerance. negative for heat intolerance. negative for tremors.   Lymphatic: negative for lower extremity swelling. negative for lymph node swelling.   Hematologic: negative for easy bruising. negative for easy bleeding.  Integumentary: negative for rash. negative for scaling. negative for nail changes.       Current Outpatient Medications:      acyclovir (ZOVIRAX) 400 MG tablet, Take 1 tablet (400 mg) by mouth 2 times daily, Disp: 60 tablet, Rfl: 11     acyclovir (ZOVIRAX) 400 MG tablet, Take 1 tablet (400 mg) by mouth 2 times daily  Please keep appointment on 12/19/19, Disp: 60 tablet, Rfl: 0     albuterol (ACCUNEB) 1.25 MG/3ML neb solution, Take 2 vials (2.5 mg) by nebulization every 4 hours as needed for shortness of breath / dyspnea or wheezing, Disp: 30 vial, Rfl: 4     albuterol (PROVENTIL HFA: VENTOLIN HFA) 108 (90 BASE) MCG/ACT inhaler, Inhale 2 puffs into the lungs every 6 hours., Disp: , Rfl:      budesonide-formoterol (SYMBICORT) 160-4.5 MCG/ACT Inhaler, Inhale 2 puffs into the lungs 2 times daily, Disp: 1 Inhaler, Rfl: 3     budesonide-formoterol (SYMBICORT) 80-4.5 MCG/ACT Inhaler, Inhale 2 puffs into the lungs 2 times daily, Disp: 1 Inhaler, Rfl: 3     Cholecalciferol (VITAMIN D-3 PO), Take 1 tablet by mouth, Disp: , Rfl:      CLARITIN-D 24 HOUR  MG per 24 hr tablet, TK 1 T PO  D, Disp: , Rfl: 0     dexamethasone (DECADRON) 0.1 % ophthalmic solution, Place 1 drop into the right eye every other day, Disp: 5 mL, Rfl: 6     EPINEPHrine (AUVI-Q) 0.3 MG/0.3ML injection 2-pack, Inject 0.3 mLs (0.3 mg) into the muscle as needed for anaphylaxis, Disp: 2 each, Rfl: 1     erythromycin (ROMYCIN) 5 MG/GM ophthalmic ointment, 1 application to each eye at bedtime, Disp: 1 Tube, Rfl: 11     estradiol  (VIVELLE-DOT) 0.025 MG/24HR bi-weekly patch, Place 1 patch onto the skin twice a week, Disp: 24 patch, Rfl: 1     fluconazole (DIFLUCAN) 100 MG tablet, Take 1 tablet (100 mg) by mouth daily for 15 days, Disp: 15 tablet, Rfl: 0     glucagon 1 MG kit, Inject 1mg as needed for anaphylaxis that is non-responding to epinephrine., Disp: 1 mg, Rfl: 0     Hypromellose (ARTIFICIAL TEARS OP), Apply 1 drop to eye as needed Usually every 30 minutes., Disp: , Rfl:      metoprolol tartrate (LOPRESSOR) 25 MG tablet, , Disp: , Rfl:      nystatin (MYCOSTATIN) 624473 UNIT/ML suspension, TAKE 5MLS BY MOUTH FOUR TIMES DAILY, Disp: , Rfl:      order for DME, Equipment being ordered: Nebulizer, Disp: 1 Box, Rfl: 0     phenazopyridine (PYRIDIUM) 100 MG tablet, Take 2 tablets (200 mg) by mouth 3 times daily as needed, Disp: 20 tablet, Rfl: 1     solifenacin (VESICARE) 10 MG tablet, TAKE 1 TABLET BY MOUTH DAILY, Disp: 90 tablet, Rfl: 1     tiotropium (SPIRIVA RESPIMAT) 2.5 MCG/ACT inhaler, Inhale 2 puffs into the lungs daily, Disp: 1 Inhaler, Rfl: 3     triamterene-HCTZ (DYAZIDE) 37.5-25 MG capsule, TAKE 2 CAPSULES BY MOUTH AS NEEDED FOR FLUID RETENTION, Disp: 180 capsule, Rfl: 0  Immunization History   Administered Date(s) Administered     DTaP, Unspecified 09/05/2014     Pneumococcal 23 valent 10/16/2013     TD (ADULT, 7+) 04/01/2003     Allergies   Allergen Reactions     Latex Anaphylaxis     Xray Dye  [Contrast Dye] Hives     Pt states she had allergic reaction to xray day several years ago (hives) Pt has been successfully premedicated for contrast allergy without problem.      Latex      Sulfa Drugs      Penicillins Rash     Other reaction(s): Swelling, lips/tongue  Childhood rash       ASSESSMENT/PLAN:  Problem List Items Addressed This Visit        Respiratory    Moderate persistent asthma without complication - Primary     Moderate persistent asthma.  Well-controlled on Symbicort 160/4.5 mcg 2 puff inhaled twice daily.  Tried to  taper Symbicort in the past and had increased symptoms.  Recurrent thrush.  Flares with URI, exercise, allergies, cats, dogs, cold air, mowing grass, raking leaves.  Previously on Advair.  Symbicort resulted in better control.  Additionally she was on Singulair.  Not beneficial and discontinued.          -Asthma action plan provided and reviewed with the patient.  -Decrease Symbicort to 80/4.5mcg 2 puff bid. Well controlled asthma and getting recurrent thrush.   -Start Spiriva 2.5 mcg 2 puff inhaled daily.   -Albuterol 2-4 puffs inhaled (use a spacer unless using a Proair Respiclick device) every 4 hours as needed for chest tightness, wheezing, shortness of breath and/or coughing.   -Total IgE elevated.   -absolute eosinophil count of 300  -Allergy testing positive for aspergillus, cat, dog, molds, grass, ragweed.          Relevant Medications    tiotropium (SPIRIVA RESPIMAT) 2.5 MCG/ACT inhaler    budesonide-formoterol (SYMBICORT) 80-4.5 MCG/ACT Inhaler    Seasonal allergic rhinitis due to pollen     History of allergic rhinoconjunctivitis.  Has been on allergen immunotherapy in the past.  Not recently.  Stopped immunotherapy secondary to systemic reactions.  Claritin-D is used as needed for nasal symptoms.  Significantly beneficial.     Allergy testing positive for aspergillus, cat, dog, molds, grass, ragweed.      -Claritin-D as needed. Well controlled.          Relevant Medications    tiotropium (SPIRIVA RESPIMAT) 2.5 MCG/ACT inhaler    budesonide-formoterol (SYMBICORT) 80-4.5 MCG/ACT Inhaler    Allergic rhinitis due to mold    Relevant Medications    tiotropium (SPIRIVA RESPIMAT) 2.5 MCG/ACT inhaler    budesonide-formoterol (SYMBICORT) 80-4.5 MCG/ACT Inhaler    Allergic rhinitis due to animal dander    Relevant Medications    tiotropium (SPIRIVA RESPIMAT) 2.5 MCG/ACT inhaler    budesonide-formoterol (SYMBICORT) 80-4.5 MCG/ACT Inhaler       Infectious/Inflammatory    Thrush     Recurrent thrush.  Currently on  Symbicort 160/4.5 mcg and using 2 puffs inhaled twice daily.  We will treat again with Diflucan.  Attempting to decrease dose of Symbicort to 80/4.5 mcg twice daily.  If she continues to have issues with thrush would consider prophylactic clotrimazole.         Relevant Medications    tiotropium (SPIRIVA RESPIMAT) 2.5 MCG/ACT inhaler    fluconazole (DIFLUCAN) 100 MG tablet    budesonide-formoterol (SYMBICORT) 80-4.5 MCG/ACT Inhaler        Return in 6 weeks.  Chart documentation with Dragon Voice recognition Software. Although reviewed after completion, some words and grammatical errors may remain.    I have reviewed the note as documented above.  This accurately captures the substance of my conversation with the patient.    Phone call contact time  Call Started at 1606  Call Ended at 1618    DO JORDI RosarioAAI  Allergy/Immunology  [facility] and [place], [place]        Again, thank you for allowing me to participate in the care of your patient.        Sincerely,        Abundio Joaquin DO

## 2020-12-02 NOTE — PROGRESS NOTES
"Heather Wilkinson is a 64 year old female who is being evaluated via a billable telephone visit.      The patient has been notified of following:     \"This telephone visit will be conducted via a call between you and your physician/provider. We have found that certain health care needs can be provided without the need for a physical exam.  This service lets us provide the care you need with a short phone conversation.  If a prescription is necessary, we can send it directly to your pharmacy.  If lab work is needed, we can place an order for that and you can then stop by our lab to have the test done at a later time.     If during the course of the call the physician/provider feels a telephone visit is not appropriate, you will not be charged for this service.\"   Consent has been obtained for this service by 1 care team member: yes.     I have reviewed and updated the patient's Past Medical History, Social History, Family History and Medication List.    Thrush is back. She has had issues with recurrent thrush. Treated with Nystatin liquid that she had since spring. This was helpful. Tends to get thrush in early winter. Asthma was been well controlled. The last 2 days she had wheezing in the morning. Wheezing lasted under 1 minute and resolved. On Symbicort 160/4.5mcg 2 puffs twice daily. Otherwise asthma has been controlled. Possibly minimal shortness of breath with climbing up steps. She climbs 27 steps to her office. She had previously tried tapering Symbicort from 160 to 80mcg and had increased symptoms. Went back to 160mcg and symptoms resolved. No use of Spiriva.     Having congestion. No other symptoms. Claritin-D is nearly 100% effective.     Allergy testing positive for aspergillus, cat, dog, molds, grass, ragweed.        ACT Total Scores 12/2/2020   ACT TOTAL SCORE (Goal Greater than or Equal to 20) 21   In the past 12 months, how many times did you visit the emergency room for your asthma without being " admitted to the hospital? 0   In the past 12 months, how many times were you hospitalized overnight because of your asthma? 0            Past Medical History:   Diagnosis Date     Allergic rhinitis      Asthma      Atrial fibrillation (H)      Cough      Fluid retention      Tachycardia      Family History   Problem Relation Age of Onset     Glaucoma No family hx of      Macular Degeneration No family hx of      Past Surgical History:   Procedure Laterality Date     CYSTOSCOPY  5/11/2012    Procedure:CYSTOSCOPY; Surgeon:RADHA COMER; Location:[facility] [place]     GYN SURGERY      csection, infertility, hyst     HYSTERECTOMY  1992     HYSTERECTOMY, PAP NO LONGER INDICATED       SLING TRANSVAGINAL  5/11/2012    Procedure:SLING TRANSVAGINAL; mini arc sling  and cystoscopy; Surgeon:RADHA COMER; Location:[facility] [place]       REVIEW OF SYSTEMS:  General: negative for weight gain. negative for weight loss. negative for changes in sleep.   Ears: negative for fullness. negative for hearing loss. negative for dizziness.   Nose: negative for snoring.negative for changes in smell. negative for drainage.   Eyes: negative for eye watering. negative for eye itching. negative for vision changes. negative for eye redness.  Throat: negative for hoarseness. negative for sore throat. negative for trouble swallowing.   Lungs: negative for shortness of breath.positive  for wheezing. negative for sputum production.   Cardiovascular: negative for chest pain. negative for swelling of ankles. negative for fast or irregular heartbeat.   Gastrointestinal: negative for nausea. negative for heartburn. negative for acid reflux.   Musculoskeletal: negative for joint pain. negative for joint stiffness. negative for joint swelling.   Neurologic: negative for seizures. negative for fainting. negative for weakness.   Psychiatric: negative for changes in mood. negative for anxiety.   Endocrine: negative for cold intolerance. negative for heat  intolerance. negative for tremors.   Lymphatic: negative for lower extremity swelling. negative for lymph node swelling.   Hematologic: negative for easy bruising. negative for easy bleeding.  Integumentary: negative for rash. negative for scaling. negative for nail changes.       Current Outpatient Medications:      acyclovir (ZOVIRAX) 400 MG tablet, Take 1 tablet (400 mg) by mouth 2 times daily, Disp: 60 tablet, Rfl: 11     acyclovir (ZOVIRAX) 400 MG tablet, Take 1 tablet (400 mg) by mouth 2 times daily  Please keep appointment on 12/19/19, Disp: 60 tablet, Rfl: 0     albuterol (ACCUNEB) 1.25 MG/3ML neb solution, Take 2 vials (2.5 mg) by nebulization every 4 hours as needed for shortness of breath / dyspnea or wheezing, Disp: 30 vial, Rfl: 4     albuterol (PROVENTIL HFA: VENTOLIN HFA) 108 (90 BASE) MCG/ACT inhaler, Inhale 2 puffs into the lungs every 6 hours., Disp: , Rfl:      budesonide-formoterol (SYMBICORT) 160-4.5 MCG/ACT Inhaler, Inhale 2 puffs into the lungs 2 times daily, Disp: 1 Inhaler, Rfl: 3     budesonide-formoterol (SYMBICORT) 80-4.5 MCG/ACT Inhaler, Inhale 2 puffs into the lungs 2 times daily, Disp: 1 Inhaler, Rfl: 3     Cholecalciferol (VITAMIN D-3 PO), Take 1 tablet by mouth, Disp: , Rfl:      CLARITIN-D 24 HOUR  MG per 24 hr tablet, TK 1 T PO  D, Disp: , Rfl: 0     dexamethasone (DECADRON) 0.1 % ophthalmic solution, Place 1 drop into the right eye every other day, Disp: 5 mL, Rfl: 6     EPINEPHrine (AUVI-Q) 0.3 MG/0.3ML injection 2-pack, Inject 0.3 mLs (0.3 mg) into the muscle as needed for anaphylaxis, Disp: 2 each, Rfl: 1     erythromycin (ROMYCIN) 5 MG/GM ophthalmic ointment, 1 application to each eye at bedtime, Disp: 1 Tube, Rfl: 11     estradiol (VIVELLE-DOT) 0.025 MG/24HR bi-weekly patch, Place 1 patch onto the skin twice a week, Disp: 24 patch, Rfl: 1     fluconazole (DIFLUCAN) 100 MG tablet, Take 1 tablet (100 mg) by mouth daily for 15 days, Disp: 15 tablet, Rfl: 0     glucagon 1  MG kit, Inject 1mg as needed for anaphylaxis that is non-responding to epinephrine., Disp: 1 mg, Rfl: 0     Hypromellose (ARTIFICIAL TEARS OP), Apply 1 drop to eye as needed Usually every 30 minutes., Disp: , Rfl:      metoprolol tartrate (LOPRESSOR) 25 MG tablet, , Disp: , Rfl:      nystatin (MYCOSTATIN) 544572 UNIT/ML suspension, TAKE 5MLS BY MOUTH FOUR TIMES DAILY, Disp: , Rfl:      order for DME, Equipment being ordered: Nebulizer, Disp: 1 Box, Rfl: 0     phenazopyridine (PYRIDIUM) 100 MG tablet, Take 2 tablets (200 mg) by mouth 3 times daily as needed, Disp: 20 tablet, Rfl: 1     solifenacin (VESICARE) 10 MG tablet, TAKE 1 TABLET BY MOUTH DAILY, Disp: 90 tablet, Rfl: 1     tiotropium (SPIRIVA RESPIMAT) 2.5 MCG/ACT inhaler, Inhale 2 puffs into the lungs daily, Disp: 1 Inhaler, Rfl: 3     triamterene-HCTZ (DYAZIDE) 37.5-25 MG capsule, TAKE 2 CAPSULES BY MOUTH AS NEEDED FOR FLUID RETENTION, Disp: 180 capsule, Rfl: 0  Immunization History   Administered Date(s) Administered     DTaP, Unspecified 09/05/2014     Pneumococcal 23 valent 10/16/2013     TD (ADULT, 7+) 04/01/2003     Allergies   Allergen Reactions     Latex Anaphylaxis     Xray Dye  [Contrast Dye] Hives     Pt states she had allergic reaction to xray day several years ago (hives) Pt has been successfully premedicated for contrast allergy without problem.      Latex      Sulfa Drugs      Penicillins Rash     Other reaction(s): Swelling, lips/tongue  Childhood rash       ASSESSMENT/PLAN:  Problem List Items Addressed This Visit        Respiratory    Moderate persistent asthma without complication - Primary     Moderate persistent asthma.  Well-controlled on Symbicort 160/4.5 mcg 2 puff inhaled twice daily.  Tried to taper Symbicort in the past and had increased symptoms.  Recurrent thrush.  Flares with URI, exercise, allergies, cats, dogs, cold air, mowing grass, raking leaves.  Previously on Advair.  Symbicort resulted in better control.  Additionally she  was on Singulair.  Not beneficial and discontinued.          -Asthma action plan provided and reviewed with the patient.  -Decrease Symbicort to 80/4.5mcg 2 puff bid. Well controlled asthma and getting recurrent thrush.   -Start Spiriva 2.5 mcg 2 puff inhaled daily.   -Albuterol 2-4 puffs inhaled (use a spacer unless using a Proair Respiclick device) every 4 hours as needed for chest tightness, wheezing, shortness of breath and/or coughing.   -Total IgE elevated.   -absolute eosinophil count of 300  -Allergy testing positive for aspergillus, cat, dog, molds, grass, ragweed.          Relevant Medications    tiotropium (SPIRIVA RESPIMAT) 2.5 MCG/ACT inhaler    budesonide-formoterol (SYMBICORT) 80-4.5 MCG/ACT Inhaler    Seasonal allergic rhinitis due to pollen     History of allergic rhinoconjunctivitis.  Has been on allergen immunotherapy in the past.  Not recently.  Stopped immunotherapy secondary to systemic reactions.  Claritin-D is used as needed for nasal symptoms.  Significantly beneficial.     Allergy testing positive for aspergillus, cat, dog, molds, grass, ragweed.      -Claritin-D as needed. Well controlled.          Relevant Medications    tiotropium (SPIRIVA RESPIMAT) 2.5 MCG/ACT inhaler    budesonide-formoterol (SYMBICORT) 80-4.5 MCG/ACT Inhaler    Allergic rhinitis due to mold    Relevant Medications    tiotropium (SPIRIVA RESPIMAT) 2.5 MCG/ACT inhaler    budesonide-formoterol (SYMBICORT) 80-4.5 MCG/ACT Inhaler    Allergic rhinitis due to animal dander    Relevant Medications    tiotropium (SPIRIVA RESPIMAT) 2.5 MCG/ACT inhaler    budesonide-formoterol (SYMBICORT) 80-4.5 MCG/ACT Inhaler       Infectious/Inflammatory    Thrush     Recurrent thrush.  Currently on Symbicort 160/4.5 mcg and using 2 puffs inhaled twice daily.  We will treat again with Diflucan.  Attempting to decrease dose of Symbicort to 80/4.5 mcg twice daily.  If she continues to have issues with thrush would consider prophylactic  clotrimazole.         Relevant Medications    tiotropium (SPIRIVA RESPIMAT) 2.5 MCG/ACT inhaler    fluconazole (DIFLUCAN) 100 MG tablet    budesonide-formoterol (SYMBICORT) 80-4.5 MCG/ACT Inhaler        Return in 6 weeks.  Chart documentation with Dragon Voice recognition Software. Although reviewed after completion, some words and grammatical errors may remain.    I have reviewed the note as documented above.  This accurately captures the substance of my conversation with the patient.    Phone call contact time  Call Started at 1606  Call Ended at 1618    Abundio Joaquin DO FAAAAI  Allergy/Immunology  [place], [place]

## 2020-12-02 NOTE — ASSESSMENT & PLAN NOTE
Recurrent thrush.  Currently on Symbicort 160/4.5 mcg and using 2 puffs inhaled twice daily.  We will treat again with Diflucan.  Attempting to decrease dose of Symbicort to 80/4.5 mcg twice daily.  If she continues to have issues with thrush would consider prophylactic clotrimazole.

## 2020-12-02 NOTE — PATIENT INSTRUCTIONS
Allergy Staff Appt Hours Shot Hours Locations    Physician     Abundio Joaquin DO       Support Staff     MAURY Yanes CMA  Tuesday:        [place] 7-5 Wednesday:        [place]: 7-5 Thursday:                    [place] 7-6     Friday:  [place]  7-2   [place]        Thursday: 8-5:20        Friday: 7-12     [place]        Tuesday: 7- 3:20 Wednesday: 7-4:20     [place] Monday: 7-2:20 Tuesday: 9-5:20         [facility]  [address] [place] [address]  Appt Line: [phone]  Allergy RN:  [phone]    [facility]  [address] [place] [address]  Appt Line: [phone]  Allergy RN:  [phone]       Important Scheduling Information  Aspirin Desensitization: Appt will last 2 clinic days. Please call the Allergy RN line for your clinic to schedule. Discontinue antihistamines 7 days prior to the appointment.     Food Challenges: Appt will last 3-4 hours. Please call the Allergy RN line for your clinic to schedule. Discontinue antihistamines 7 days prior to the appointment.     Penicillin Testing: Appt will last 2-3 hours. Please call the Allergy RN line for your clinic to schedule. Discontinue antihistamines 7 days prior to the appointment.     Skin Testing: Appt will about 40 minutes. Call the appointment line for your clinic to schedule. Discontinue antihistamines 7 days prior to the appointment.     Venom Testing: Appt will last 2-3 hours. Please call the Allergy RN line for your clinic to schedule. Discontinue antihistamines 7 days prior to the appointment.     Thank you for trusting us with your Allergy, Asthma, and Immunology care. Please feel free to contact us with any questions or concerns you may have.

## 2020-12-02 NOTE — ASSESSMENT & PLAN NOTE
Moderate persistent asthma.  Well-controlled on Symbicort 160/4.5 mcg 2 puff inhaled twice daily.  Tried to taper Symbicort in the past and had increased symptoms.  Recurrent thrush.  Flares with URI, exercise, allergies, cats, dogs, cold air, mowing grass, raking leaves.  Previously on Advair.  Symbicort resulted in better control.  Additionally she was on Singulair.  Not beneficial and discontinued.          -Asthma action plan provided and reviewed with the patient.  -Decrease Symbicort to 80/4.5mcg 2 puff bid. Well controlled asthma and getting recurrent thrush.   -Start Spiriva 2.5 mcg 2 puff inhaled daily.   -Albuterol 2-4 puffs inhaled (use a spacer unless using a Proair Respiclick device) every 4 hours as needed for chest tightness, wheezing, shortness of breath and/or coughing.   -Total IgE elevated.   -absolute eosinophil count of 300  -Allergy testing positive for aspergillus, cat, dog, molds, grass, ragweed.

## 2020-12-03 ENCOUNTER — TELEPHONE (OUTPATIENT)
Dept: ALLERGY | Facility: OTHER | Age: 65
End: 2020-12-03

## 2020-12-03 ASSESSMENT — ASTHMA QUESTIONNAIRES: ACT_TOTALSCORE: 21

## 2020-12-03 NOTE — TELEPHONE ENCOUNTER
PA Initiation    Medication: tiotropium (SPIRIVA RESPIMAT) 2.5 MCG/ACT inhaler  Insurance Company: [employer] - Phone [phone] Fax [phone]  Pharmacy Filling the Rx: [id number] - [place], [place] - [address] AT [facility] [address]  Filling Pharmacy Phone: [phone]  Filling Pharmacy Fax: [phone]  Start Date: 12/3/2020

## 2020-12-03 NOTE — TELEPHONE ENCOUNTER
Prior Authorization Retail Medication Request    Medication/Dose: tiotropium (SPIRIVA RESPIMAT) 2.5 MCG/ACT inhaler  ICD code (if different than what is on RX):    Previously Tried and Failed:    Rationale:      Insurance Name:    Insurance ID:        Pharmacy Information (if different than what is on RX)  Name:    Phone:

## 2020-12-04 NOTE — TELEPHONE ENCOUNTER
Prior Authorization Approval    Authorization Effective Date: 11/4/2020  Authorization Expiration Date: 12/4/2023  Medication: tiotropium (SPIRIVA RESPIMAT) 2.5 MCG/ACT inhaler-APPROVED  Approved Dose/Quantity:   Reference #: [id number]   Insurance Company: [employer] - Phone [phone] Fax [phone]  Expected CoPay:       CoPay Card Available:      Foundation Assistance Needed:    Which Pharmacy is filling the prescription (Not needed for infusion/clinic administered): [id number] [place], [place] - [address] AT [facility] [address] [facility] [address]  Pharmacy Notified: Yes  Patient Notified: No    Pharmacy will notify patient when medication is ready.

## 2020-12-07 DIAGNOSIS — N95.1 SYMPTOMATIC MENOPAUSAL OR FEMALE CLIMACTERIC STATES: ICD-10-CM

## 2020-12-07 RX ORDER — ESTRADIOL 0.03 MG/D
FILM, EXTENDED RELEASE TRANSDERMAL
Qty: 8 PATCH | Refills: 0 | Status: SHIPPED | OUTPATIENT
Start: 2020-12-07 | End: 2020-12-08

## 2020-12-07 NOTE — TELEPHONE ENCOUNTER
"Requested Prescriptions   Pending Prescriptions Disp Refills     estradiol (VIVELLE-DOT) 0.025 MG/24HR bi-weekly patch [Pharmacy Med Name: ESTRADIOL 0.025MG PATCH (TWICE WK)] 24 patch 1     Sig: APPLY 1 PATCH EXTERNALLY TO THE SKIN 2 TIMES A WEEK       Hormone Replacement Therapy Passed - 12/7/2020 12:25 PM        Passed - Blood pressure under 140/90 in past 12 months     BP Readings from Last 3 Encounters:   03/11/20 106/62   11/21/19 100/60   10/24/18 106/70                 Passed - Recent (12 mo) or future (30 days) visit within the authorizing provider's specialty     Patient has had an office visit with the authorizing provider or a provider within the authorizing providers department within the previous 12 mos or has a future within next 30 days. See \"Patient Info\" tab in inbasket, or \"Choose Columns\" in Meds & Orders section of the refill encounter.              Passed - Patient has mammogram in past 2 years on file if age 50-75        Passed - Medication is active on med list        Passed - Patient is 18 years of age or older        Passed - No active pregnancy on record        Passed - No positive pregnancy test on record in past 12 months           Last Written Prescription Date:  7/2/20  Last Fill Quantity: 24 patch,  # refills: 1   Last office visit: 11/21/2019 with prescribing provider:  Dr Hassan   Future Office Visit:   Next 5 appointments (look out 90 days)    Dec 15, 2020  2:30 PM  PHYSICAL with OMID Littlejohn CNP  [facility] [place] ([facility] [address] [place] [id number] [phone]                   "

## 2020-12-08 RX ORDER — ESTRADIOL 0.03 MG/D
1 FILM, EXTENDED RELEASE TRANSDERMAL
Qty: 24 PATCH | Refills: 0 | Status: SHIPPED | OUTPATIENT
Start: 2020-12-10 | End: 2021-02-16

## 2020-12-08 NOTE — TELEPHONE ENCOUNTER
"Requested Prescriptions   Pending Prescriptions Disp Refills     estradiol (VIVELLE-DOT) 0.025 MG/24HR bi-weekly patch [Pharmacy Med Name: ESTRADIOL 0.025MG PATCH (TWICE WK)] 24 patch      Sig: APPLY 1 PATCH EXTERNALLY TO THE SKIN 2 TIMES A WEEK       Hormone Replacement Therapy Passed - 12/7/2020  3:51 PM        Passed - Blood pressure under 140/90 in past 12 months     BP Readings from Last 3 Encounters:   03/11/20 106/62   11/21/19 100/60   10/24/18 106/70                 Passed - Recent (12 mo) or future (30 days) visit within the authorizing provider's specialty     Patient has had an office visit with the authorizing provider or a provider within the authorizing providers department within the previous 12 mos or has a future within next 30 days. See \"Patient Info\" tab in inbasket, or \"Choose Columns\" in Meds & Orders section of the refill encounter.              Passed - Patient has mammogram in past 2 years on file if age 50-75        Passed - Medication is active on med list        Passed - Patient is 18 years of age or older        Passed - No active pregnancy on record        Passed - No positive pregnancy test on record in past 12 months           Last Written Prescription Date:  12/07/2020  Last Fill Quantity: 8,  # refills: 0   Last office visit: 11/21/2019 with prescribing provider:  Toma Simmons   Future Office Visit:   Next 5 appointments (look out 90 days)    Dec 15, 2020  2:30 PM  PHYSICAL with Nydia Simmons, OMID [facility] [place] ([facility] [place]) [address] [place] [id number] [phone]                 "

## 2020-12-08 NOTE — TELEPHONE ENCOUNTER
Medication is being filled for 1 time refill only due to:  Patient needs to be seen because it has been more than one year since last visit.  Appointment scheduled      Melva Loya RN on 12/8/2020 at 10:23 AM

## 2020-12-14 ENCOUNTER — HEALTH MAINTENANCE LETTER (OUTPATIENT)
Age: 65
End: 2020-12-14

## 2020-12-30 DIAGNOSIS — B00.52 HERPES SIMPLEX DISCIFORM KERATITIS: ICD-10-CM

## 2020-12-30 DIAGNOSIS — H04.123 DRY EYE SYNDROME OF BOTH EYES: ICD-10-CM

## 2020-12-30 DIAGNOSIS — R60.9 EDEMA, UNSPECIFIED TYPE: ICD-10-CM

## 2020-12-30 RX ORDER — TRIAMTERENE AND HYDROCHLOROTHIAZIDE 37.5; 25 MG/1; MG/1
CAPSULE ORAL
Qty: 180 CAPSULE | Refills: 0 | Status: SHIPPED | OUTPATIENT
Start: 2020-12-30 | End: 2021-02-16

## 2020-12-30 RX ORDER — ACYCLOVIR 400 MG/1
400 TABLET ORAL 2 TIMES DAILY
Qty: 60 TABLET | Refills: 0 | Status: SHIPPED | OUTPATIENT
Start: 2020-12-30 | End: 2021-01-31

## 2020-12-30 NOTE — TELEPHONE ENCOUNTER
"Requested Prescriptions   Pending Prescriptions Disp Refills     triamterene-HCTZ (DYAZIDE) 37.5-25 MG capsule 180 capsule 0       Diuretics (Including Combos) Protocol Failed - 12/30/2020  9:21 AM        Failed - Normal serum creatinine on file in past 12 months     Recent Labs   Lab Test 05/30/14   CR 0.74              Failed - Normal serum potassium on file in past 12 months     Recent Labs   Lab Test 05/30/14   POTASSIUM 3.6                    Failed - Normal serum sodium on file in past 12 months     No lab results found.           Passed - Blood pressure under 140/90 in past 12 months     BP Readings from Last 3 Encounters:   03/11/20 106/62   11/21/19 100/60   10/24/18 106/70                 Passed - Recent (12 mo) or future (30 days) visit within the authorizing provider's specialty     Patient has had an office visit with the authorizing provider or a provider within the authorizing providers department within the previous 12 mos or has a future within next 30 days. See \"Patient Info\" tab in inbasket, or \"Choose Columns\" in Meds & Orders section of the refill encounter.              Passed - Medication is active on med list        Passed - Patient is age 18 or older        Passed - No active pregancy on record        Passed - No positive pregnancy test in past 12 months           Last Written Prescription Date:  10/1/20  Last Fill Quantity: 180,  # refills: 0   Last office visit: 11/21/2019 with provider: Dr James Hassan  Future Office Visit:   Next 5 appointments (look out 90 days)    Jan 21, 2021 10:30 AM  PHYSICAL with OMID Littlejohn CNP  [facility] [place] ([facility] [place]) [address] [id number] [phone]                 "

## 2020-12-30 NOTE — TELEPHONE ENCOUNTER
Pt has f/u appt mid January     Rx for acyclovir sent without refill-- able to review continuing at visit    Omar Mason RN 2:10 PM 12/30/20

## 2020-12-30 NOTE — TELEPHONE ENCOUNTER
Medication is being filled for 1 time refill only due to:  Patient needs to be seen because it has been more than one year since last visit.  Appointment scheduled  Melva Loya RN on 12/30/2020 at 9:31 AM

## 2021-01-24 ENCOUNTER — MYC MEDICAL ADVICE (OUTPATIENT)
Dept: ALLERGY | Facility: OTHER | Age: 66
End: 2021-01-24

## 2021-01-26 ENCOUNTER — VIRTUAL VISIT (OUTPATIENT)
Dept: ALLERGY | Facility: OTHER | Age: 66
End: 2021-01-26
Payer: COMMERCIAL

## 2021-01-26 VITALS — WEIGHT: 175 LBS | HEIGHT: 66 IN | BODY MASS INDEX: 28.12 KG/M2

## 2021-01-26 DIAGNOSIS — J30.1 SEASONAL ALLERGIC RHINITIS DUE TO POLLEN: ICD-10-CM

## 2021-01-26 DIAGNOSIS — J30.89 ALLERGIC RHINITIS DUE TO MOLD: ICD-10-CM

## 2021-01-26 DIAGNOSIS — J01.90 ACUTE NON-RECURRENT SINUSITIS, UNSPECIFIED LOCATION: Primary | ICD-10-CM

## 2021-01-26 DIAGNOSIS — J30.81 ALLERGIC RHINITIS DUE TO ANIMAL DANDER: ICD-10-CM

## 2021-01-26 DIAGNOSIS — J45.41 MODERATE PERSISTENT ASTHMA WITH ACUTE EXACERBATION: ICD-10-CM

## 2021-01-26 PROCEDURE — 99214 OFFICE O/P EST MOD 30 MIN: CPT | Mod: GT | Performed by: ALLERGY & IMMUNOLOGY

## 2021-01-26 RX ORDER — FLUTICASONE PROPIONATE 50 MCG
2 SPRAY, SUSPENSION (ML) NASAL DAILY
Qty: 16 G | Refills: 4 | Status: SHIPPED | OUTPATIENT
Start: 2021-01-26 | End: 2021-01-27

## 2021-01-26 RX ORDER — PREDNISONE 10 MG/1
30 TABLET ORAL 2 TIMES DAILY
Qty: 18 TABLET | Refills: 0 | Status: SHIPPED | OUTPATIENT
Start: 2021-01-26 | End: 2021-01-29

## 2021-01-26 RX ORDER — DOXYCYCLINE HYCLATE 100 MG
100 TABLET ORAL 2 TIMES DAILY
Qty: 20 TABLET | Refills: 0 | Status: SHIPPED | OUTPATIENT
Start: 2021-01-26 | End: 2021-02-05

## 2021-01-26 ASSESSMENT — MIFFLIN-ST. JEOR: SCORE: 1347.6

## 2021-01-26 NOTE — ASSESSMENT & PLAN NOTE
History of allergic rhinoconjunctivitis.  Has been on allergen immunotherapy in the past.  Not recently.  Stopped immunotherapy secondary to systemic reactions.  Claritin-D is used as needed for nasal symptoms.  Significantly beneficial.     Allergy testing positive for aspergillus, cat, dog, molds, grass, ragweed.      -Claritin-D as needed.

## 2021-01-26 NOTE — PROGRESS NOTES
"Heather Wilkinson is a 65 year old female who is being evaluated via a billable video visit.      The patient has been notified of following:      \"This video visit will be conducted via a call between you and your physician/provider. We have found that certain health care needs can be provided without the need for an in-person physical exam.  This service lets us provide the care you need with a video conversation.  If a prescription is necessary we can send it directly to your pharmacy.  If lab work is needed we can place an order for that and you can then stop by our lab to have the test done at a later time.     If during the course of the call the physician/provider feels a video visit is not appropriate, you will not be charged for this service.\"    Patient has given verbal consent for Video visit? Yes     Patient would like the video invitation sent by: [phone]     I have reviewed and updated the patient's Past Medical History, Social History, Family History and Medication List.    Returns for follow up visit. Wanted to start on Spiriva but this was declined. On Symbicort 160/4.5mcg 2 puffs twice daily. On Diflucan now for thrush. Did 15 days of Diflucan.     Start in sinuses last week. Symptoms started Thursday with colored mucus, sinus pressure, congestion, post nasal drip. Claritin-D is effective. Afrin used twice daily for 4 days. Not using nasal steroid. Previously used.     Having wheezing, shortness of breath. Started on prednisone on Friday night. Started 30mg PO bid for 5 days. Helpful for chest symptoms. Some wheezing remains. Still with cough. Using albuterol 3 times daily. Effective when used.     Past Medical History:   Diagnosis Date     Allergic rhinitis      Asthma      Atrial fibrillation (H)      Cough      Fluid retention      Tachycardia      Family History   Problem Relation Age of Onset     Glaucoma No family hx of      Macular Degeneration No family hx of      Past Surgical " History:   Procedure Laterality Date     CYSTOSCOPY  5/11/2012    Procedure:CYSTOSCOPY; Surgeon:RADHA COMER; Location:[facility]     GYN SURGERY      csection, infertility, hyst     HYSTERECTOMY  1992     HYSTERECTOMY, PAP NO LONGER INDICATED       SLING TRANSVAGINAL  5/11/2012    Procedure:SLING TRANSVAGINAL; mini arc sling  and cystoscopy; Surgeon:RADHA COMER; Location:[facility]       REVIEW OF SYSTEMS:  General: negative for weight gain. negative for weight loss. negative for changes in sleep.   Ears: negative for fullness. negative for hearing loss. negative for dizziness.   Nose: negative for snoring.negative for changes in smell. negative for drainage.   Eyes: negative for eye watering. negative for eye itching. negative for vision changes. negative for eye redness.  Throat: negative for hoarseness. negative for sore throat. negative for trouble swallowing.   Lungs: negative for shortness of breath.positive  for wheezing. positive  for sputum production.   Cardiovascular: negative for chest pain. negative for swelling of ankles. negative for fast or irregular heartbeat.   Gastrointestinal: negative for nausea. negative for heartburn. negative for acid reflux.   Musculoskeletal: negative for joint pain. negative for joint stiffness. negative for joint swelling.   Neurologic: negative for seizures. negative for fainting. negative for weakness.   Psychiatric: negative for changes in mood. negative for anxiety.   Endocrine: negative for cold intolerance. negative for heat intolerance. negative for tremors.   Lymphatic: negative for lower extremity swelling. negative for lymph node swelling.   Hematologic: negative for easy bruising. negative for easy bleeding.  Integumentary: negative for rash. negative for scaling. negative for nail changes.       Current Outpatient Medications:      acyclovir (ZOVIRAX) 400 MG tablet, Take 1 tablet (400 mg) by mouth 2 times daily, Disp: 60 tablet, Rfl: 0     albuterol  (ACCUNEB) 1.25 MG/3ML neb solution, Take 2 vials (2.5 mg) by nebulization every 4 hours as needed for shortness of breath / dyspnea or wheezing, Disp: 30 vial, Rfl: 4     albuterol (PROVENTIL HFA: VENTOLIN HFA) 108 (90 BASE) MCG/ACT inhaler, Inhale 2 puffs into the lungs every 6 hours., Disp: , Rfl:      budesonide-formoterol (SYMBICORT) 160-4.5 MCG/ACT Inhaler, Inhale 2 puffs into the lungs 2 times daily, Disp: 1 Inhaler, Rfl: 3     Cholecalciferol (VITAMIN D-3 PO), Take 1 tablet by mouth, Disp: , Rfl:      CLARITIN-D 24 HOUR  MG per 24 hr tablet, TK 1 T PO  D, Disp: , Rfl: 0     dexamethasone (DECADRON) 0.1 % ophthalmic solution, Place 1 drop into the right eye every other day, Disp: 5 mL, Rfl: 6     doxycycline hyclate (VIBRA-TABS) 100 MG tablet, Take 1 tablet (100 mg) by mouth 2 times daily for 10 days, Disp: 20 tablet, Rfl: 0     EPINEPHrine (AUVI-Q) 0.3 MG/0.3ML injection 2-pack, Inject 0.3 mLs (0.3 mg) into the muscle as needed for anaphylaxis, Disp: 2 each, Rfl: 1     erythromycin (ROMYCIN) 5 MG/GM ophthalmic ointment, 1 application to each eye at bedtime, Disp: 1 Tube, Rfl: 11     estradiol (VIVELLE-DOT) 0.025 MG/24HR bi-weekly patch, Place 1 patch onto the skin twice a week Appointment needed for additional refills., Disp: 24 patch, Rfl: 0     fluticasone (FLONASE) 50 MCG/ACT nasal spray, Spray 2 sprays into both nostrils daily, Disp: 16 g, Rfl: 4     glucagon 1 MG kit, Inject 1mg as needed for anaphylaxis that is non-responding to epinephrine., Disp: 1 mg, Rfl: 0     Hypromellose (ARTIFICIAL TEARS OP), Apply 1 drop to eye as needed Usually every 30 minutes., Disp: , Rfl:      metoprolol tartrate (LOPRESSOR) 25 MG tablet, , Disp: , Rfl:      nystatin (MYCOSTATIN) 844815 UNIT/ML suspension, TAKE 5MLS BY MOUTH FOUR TIMES DAILY, Disp: , Rfl:      order for DME, Equipment being ordered: Nebulizer, Disp: 1 Box, Rfl: 0     phenazopyridine (PYRIDIUM) 100 MG tablet, Take 2 tablets (200 mg) by mouth 3 times  daily as needed, Disp: 20 tablet, Rfl: 1     predniSONE (DELTASONE) 10 MG tablet, Take 3 tablets (30 mg) by mouth 2 times daily for 3 days, Disp: 18 tablet, Rfl: 0     solifenacin (VESICARE) 10 MG tablet, TAKE 1 TABLET BY MOUTH DAILY, Disp: 90 tablet, Rfl: 1     triamterene-HCTZ (DYAZIDE) 37.5-25 MG capsule, TAKE 2 CAPSULES BY MOUTH AS NEEDED FOR FLUID RETENTION, Disp: 180 capsule, Rfl: 0  Immunization History   Administered Date(s) Administered     DTaP, Unspecified 09/05/2014     Pneumococcal 23 valent 10/16/2013     TD (ADULT, 7+) 04/01/2003     Allergies   Allergen Reactions     Latex Anaphylaxis     Xray Dye  [Contrast Dye] Hives     Pt states she had allergic reaction to xray day several years ago (hives) Pt has been successfully premedicated for contrast allergy without problem.      Latex      Sulfa Drugs      Penicillins Rash     Other reaction(s): Swelling, lips/tongue  Childhood rash         EXAM:   Constitutional:  Appears well-developed and well-nourished. No distress.   HEENT:   Head: Normocephalic.   Maxillary and frontal sinus tenderness to palpation.   Neuro: Oriented to person, place, and time.  Skin: Skin is warm and dry. No rash noted.   Psychiatric: Normal mood and affect.     Nursing note and vitals reviewed.    ASSESSMENT/PLAN:  Problem List Items Addressed This Visit        Respiratory    Moderate persistent asthma with acute exacerbation     Moderate persistent asthma. Current exacerbation. On Symbicort 160/4.5 mcg 2 puff inhaled twice daily. has struggled with recurrent thrush. Spiriva not covered. Current sinusitis and flaring chest symptoms. Went on prednisone and helpful for chest symptoms.Flares with URI, exercise, allergies, cats, dogs, cold air, mowing grass, raking leaves.  Previously on Advair.  Symbicort resulted in better control.  Additionally she was on Singulair.  Not beneficial and discontinued.           -Symbicort to 160/4.5mcg 2 puff bid.   -Albuterol 2-4 puffs inhaled (use  a spacer unless using a Proair Respiclick device) every 4 hours as needed for chest tightness, wheezing, shortness of breath and/or coughing.   -Total IgE elevated.   -absolute eosinophil count of 300  -Allergy testing positive for aspergillus, cat, dog, molds, grass, ragweed.   - Prednisone 30mg PO bid for 3 additional days.          Relevant Medications    fluticasone (FLONASE) 50 MCG/ACT nasal spray    predniSONE (DELTASONE) 10 MG tablet    Seasonal allergic rhinitis due to pollen     History of allergic rhinoconjunctivitis.  Has been on allergen immunotherapy in the past.  Not recently.  Stopped immunotherapy secondary to systemic reactions.  Claritin-D is used as needed for nasal symptoms.  Significantly beneficial.     Allergy testing positive for aspergillus, cat, dog, molds, grass, ragweed.      -Claritin-D as needed.          Relevant Medications    fluticasone (FLONASE) 50 MCG/ACT nasal spray    Allergic rhinitis due to mold    Relevant Medications    fluticasone (FLONASE) 50 MCG/ACT nasal spray    Allergic rhinitis due to animal dander    Relevant Medications    fluticasone (FLONASE) 50 MCG/ACT nasal spray    Acute non-recurrent sinusitis, unspecified location - Primary     Colored mucus, sinus pressure, congestion for 5-6 days. Using Afrin. Flared asthma.     - Afrin for 2 additional days.   - Claritin-D as needed.   - Flonase 2 sprays/nostril daily for next 2 weeks.   - Doxycycline 100mg PO BID script provided for use for 10 days. She will only start if continues to have symptoms without improvement at days 10-14. Discussed in depth with patient.         Relevant Medications    doxycycline hyclate (VIBRA-TABS) 100 MG tablet    fluticasone (FLONASE) 50 MCG/ACT nasal spray    predniSONE (DELTASONE) 10 MG tablet        Return in 3 months.     Chart documentation with Dragon Voice recognition Software. Although reviewed after completion, some words and grammatical errors may remain.    I have reviewed the  note as documented above.  This accurately captures the substance of my conversation with the patient.    Video visit contact time  Video visit started at 1340  Video visit ended at 1332    Video-Visit Details     Type of service:  Video Visit     Originating Location (pt. Location): Home     Distant Location (provider location):  [facility]     Mode of Communication:  Video Conference via Doximity    Abundio Joaquin DO FAAAAI  Allergy/Immunology  [place], [place]

## 2021-01-26 NOTE — ASSESSMENT & PLAN NOTE
Colored mucus, sinus pressure, congestion for 5-6 days. Using Afrin. Flared asthma.     - Afrin for 2 additional days.   - Claritin-D as needed.   - Flonase 2 sprays/nostril daily for next 2 weeks.   - Doxycycline 100mg PO BID script provided for use for 10 days. She will only start if continues to have symptoms without improvement at days 10-14. Discussed in depth with patient.

## 2021-01-26 NOTE — PATIENT INSTRUCTIONS
Allergy Staff Appt Hours Shot Hours Locations    Physician     Abundio Joaquin DO       Support Staff     MAURY Yanes CMA  Tuesday:        [place] 7-5 Wednesday:        [place]: 7-5 Thursday:                    [place] 7-6     Friday:  [place]  7-2   [place]        Thursday: 8-5:20        Friday: 7-12     [place]        Tuesday: 7- 3:20 Wednesday: 7-4:20     [place] Monday: 7-2:20 Tuesday: 9-5:20         [facility]  [address] [place] [address]  Appt Line: [phone]  Allergy RN:  [phone]    [facility]  [address] [place] [address]  Appt Line: [phone]  Allergy RN:  [phone]       Important Scheduling Information  Aspirin Desensitization: Appt will last 2 clinic days. Please call the Allergy RN line for your clinic to schedule. Discontinue antihistamines 7 days prior to the appointment.     Food Challenges: Appt will last 3-4 hours. Please call the Allergy RN line for your clinic to schedule. Discontinue antihistamines 7 days prior to the appointment.     Penicillin Testing: Appt will last 2-3 hours. Please call the Allergy RN line for your clinic to schedule. Discontinue antihistamines 7 days prior to the appointment.     Skin Testing: Appt will about 40 minutes. Call the appointment line for your clinic to schedule. Discontinue antihistamines 7 days prior to the appointment.     Venom Testing: Appt will last 2-3 hours. Please call the Allergy RN line for your clinic to schedule. Discontinue antihistamines 7 days prior to the appointment.     Thank you for trusting us with your Allergy, Asthma, and Immunology care. Please feel free to contact us with any questions or concerns you may have.      - Afrin for the next 2 days.   - Prednisone 30mg by mouth twice daily for 3 additional days.   - Symbicort 160/4.5mcg 2 puffs inhaled twice daily with a spacer.   - Albuterol 2-4 puffs inhaled (use a spacer unless using a Proair  Respiclick device) every 4 hours as needed for chest tightness, wheezing, shortness of breath and/or coughing.   - Start Doxycycline if sinus symptoms do not improve by day 10-14. If start use 100mg twice daily for 10 days.   - Flonase 2 sprays/nostril daily for next 14 days.

## 2021-01-26 NOTE — LETTER
"    1/26/2021         RE: Heather Wilkinson  [address] [facility] [address]        Dear Colleague,    Thank you for referring your patient, Heather Wilkinson, to the [facility]. Please see a copy of my visit note below.    Heather Wilkinson is a 65 year old female who is being evaluated via a billable video visit.      The patient has been notified of following:      \"This video visit will be conducted via a call between you and your physician/provider. We have found that certain health care needs can be provided without the need for an in-person physical exam.  This service lets us provide the care you need with a video conversation.  If a prescription is necessary we can send it directly to your pharmacy.  If lab work is needed we can place an order for that and you can then stop by our lab to have the test done at a later time.     If during the course of the call the physician/provider feels a video visit is not appropriate, you will not be charged for this service.\"    Patient has given verbal consent for Video visit? Yes     Patient would like the video invitation sent by: [phone]     I have reviewed and updated the patient's Past Medical History, Social History, Family History and Medication List.    Returns for follow up visit. Wanted to start on Spiriva but this was declined. On Symbicort 160/4.5mcg 2 puffs twice daily. On Diflucan now for thrush. Did 15 days of Diflucan.     Start in sinuses last week. Symptoms started Thursday with colored mucus, sinus pressure, congestion, post nasal drip. Claritin-D is effective. Afrin used twice daily for 4 days. Not using nasal steroid. Previously used.     Having wheezing, shortness of breath. Started on prednisone on Friday night. Started 30mg PO bid for 5 days. Helpful for chest symptoms. Some wheezing remains. Still with cough. Using albuterol 3 times daily. Effective when used.     Past Medical History:   Diagnosis Date     " Allergic rhinitis      Asthma      Atrial fibrillation (H)      Cough      Fluid retention      Tachycardia      Family History   Problem Relation Age of Onset     Glaucoma No family hx of      Macular Degeneration No family hx of      Past Surgical History:   Procedure Laterality Date     CYSTOSCOPY  5/11/2012    Procedure:CYSTOSCOPY; Surgeon:RADHA COMER; Location:[facility]     GYN SURGERY      csection, infertility, hyst     HYSTERECTOMY  1992     HYSTERECTOMY, PAP NO LONGER INDICATED       SLING TRANSVAGINAL  5/11/2012    Procedure:SLING TRANSVAGINAL; mini arc sling  and cystoscopy; Surgeon:RADHA COMER; Location:[facility]       REVIEW OF SYSTEMS:  General: negative for weight gain. negative for weight loss. negative for changes in sleep.   Ears: negative for fullness. negative for hearing loss. negative for dizziness.   Nose: negative for snoring.negative for changes in smell. negative for drainage.   Eyes: negative for eye watering. negative for eye itching. negative for vision changes. negative for eye redness.  Throat: negative for hoarseness. negative for sore throat. negative for trouble swallowing.   Lungs: negative for shortness of breath.positive  for wheezing. positive  for sputum production.   Cardiovascular: negative for chest pain. negative for swelling of ankles. negative for fast or irregular heartbeat.   Gastrointestinal: negative for nausea. negative for heartburn. negative for acid reflux.   Musculoskeletal: negative for joint pain. negative for joint stiffness. negative for joint swelling.   Neurologic: negative for seizures. negative for fainting. negative for weakness.   Psychiatric: negative for changes in mood. negative for anxiety.   Endocrine: negative for cold intolerance. negative for heat intolerance. negative for tremors.   Lymphatic: negative for lower extremity swelling. negative for lymph node swelling.   Hematologic: negative for easy bruising. negative for easy  bleeding.  Integumentary: negative for rash. negative for scaling. negative for nail changes.       Current Outpatient Medications:      acyclovir (ZOVIRAX) 400 MG tablet, Take 1 tablet (400 mg) by mouth 2 times daily, Disp: 60 tablet, Rfl: 0     albuterol (ACCUNEB) 1.25 MG/3ML neb solution, Take 2 vials (2.5 mg) by nebulization every 4 hours as needed for shortness of breath / dyspnea or wheezing, Disp: 30 vial, Rfl: 4     albuterol (PROVENTIL HFA: VENTOLIN HFA) 108 (90 BASE) MCG/ACT inhaler, Inhale 2 puffs into the lungs every 6 hours., Disp: , Rfl:      budesonide-formoterol (SYMBICORT) 160-4.5 MCG/ACT Inhaler, Inhale 2 puffs into the lungs 2 times daily, Disp: 1 Inhaler, Rfl: 3     Cholecalciferol (VITAMIN D-3 PO), Take 1 tablet by mouth, Disp: , Rfl:      CLARITIN-D 24 HOUR  MG per 24 hr tablet, TK 1 T PO  D, Disp: , Rfl: 0     dexamethasone (DECADRON) 0.1 % ophthalmic solution, Place 1 drop into the right eye every other day, Disp: 5 mL, Rfl: 6     doxycycline hyclate (VIBRA-TABS) 100 MG tablet, Take 1 tablet (100 mg) by mouth 2 times daily for 10 days, Disp: 20 tablet, Rfl: 0     EPINEPHrine (AUVI-Q) 0.3 MG/0.3ML injection 2-pack, Inject 0.3 mLs (0.3 mg) into the muscle as needed for anaphylaxis, Disp: 2 each, Rfl: 1     erythromycin (ROMYCIN) 5 MG/GM ophthalmic ointment, 1 application to each eye at bedtime, Disp: 1 Tube, Rfl: 11     estradiol (VIVELLE-DOT) 0.025 MG/24HR bi-weekly patch, Place 1 patch onto the skin twice a week Appointment needed for additional refills., Disp: 24 patch, Rfl: 0     fluticasone (FLONASE) 50 MCG/ACT nasal spray, Spray 2 sprays into both nostrils daily, Disp: 16 g, Rfl: 4     glucagon 1 MG kit, Inject 1mg as needed for anaphylaxis that is non-responding to epinephrine., Disp: 1 mg, Rfl: 0     Hypromellose (ARTIFICIAL TEARS OP), Apply 1 drop to eye as needed Usually every 30 minutes., Disp: , Rfl:      metoprolol tartrate (LOPRESSOR) 25 MG tablet, , Disp: , Rfl:       nystatin (MYCOSTATIN) [id number] UNIT/ML suspension, TAKE 5MLS BY MOUTH FOUR TIMES DAILY, Disp: , Rfl:      order for DME, Equipment being ordered: Nebulizer, Disp: 1 Box, Rfl: 0     phenazopyridine (PYRIDIUM) 100 MG tablet, Take 2 tablets (200 mg) by mouth 3 times daily as needed, Disp: 20 tablet, Rfl: 1     predniSONE (DELTASONE) 10 MG tablet, Take 3 tablets (30 mg) by mouth 2 times daily for 3 days, Disp: 18 tablet, Rfl: 0     solifenacin (VESICARE) 10 MG tablet, TAKE 1 TABLET BY MOUTH DAILY, Disp: 90 tablet, Rfl: 1     triamterene-HCTZ (DYAZIDE) 37.5-25 MG capsule, TAKE 2 CAPSULES BY MOUTH AS NEEDED FOR FLUID RETENTION, Disp: 180 capsule, Rfl: 0  Immunization History   Administered Date(s) Administered     DTaP, Unspecified 09/05/2014     Pneumococcal 23 valent 10/16/2013     TD (ADULT, 7+) 04/01/2003     Allergies   Allergen Reactions     Latex Anaphylaxis     Xray Dye  [Contrast Dye] Hives     Pt states she had allergic reaction to xray day several years ago (hives) Pt has been successfully premedicated for contrast allergy without problem.      Latex      Sulfa Drugs      Penicillins Rash     Other reaction(s): Swelling, lips/tongue  Childhood rash         EXAM:   Constitutional:  Appears well-developed and well-nourished. No distress.   HEENT:   Head: Normocephalic.   Maxillary and frontal sinus tenderness to palpation.   Neuro: Oriented to person, place, and time.  Skin: Skin is warm and dry. No rash noted.   Psychiatric: Normal mood and affect.     Nursing note and vitals reviewed.    ASSESSMENT/PLAN:  Problem List Items Addressed This Visit        Respiratory    Moderate persistent asthma with acute exacerbation     Moderate persistent asthma. Current exacerbation. On Symbicort 160/4.5 mcg 2 puff inhaled twice daily. has struggled with recurrent thrush. Spiriva not covered. Current sinusitis and flaring chest symptoms. Went on prednisone and helpful for chest symptoms.Flares with URI, exercise, allergies,  cats, dogs, cold air, mowing grass, raking leaves.  Previously on Advair.  Symbicort resulted in better control.  Additionally she was on Singulair.  Not beneficial and discontinued.           -Symbicort to 160/4.5mcg 2 puff bid.   -Albuterol 2-4 puffs inhaled (use a spacer unless using a Proair Respiclick device) every 4 hours as needed for chest tightness, wheezing, shortness of breath and/or coughing.   -Total IgE elevated.   -absolute eosinophil count of 300  -Allergy testing positive for aspergillus, cat, dog, molds, grass, ragweed.   - Prednisone 30mg PO bid for 3 additional days.          Relevant Medications    fluticasone (FLONASE) 50 MCG/ACT nasal spray    predniSONE (DELTASONE) 10 MG tablet    Seasonal allergic rhinitis due to pollen     History of allergic rhinoconjunctivitis.  Has been on allergen immunotherapy in the past.  Not recently.  Stopped immunotherapy secondary to systemic reactions.  Claritin-D is used as needed for nasal symptoms.  Significantly beneficial.     Allergy testing positive for aspergillus, cat, dog, molds, grass, ragweed.      -Claritin-D as needed.          Relevant Medications    fluticasone (FLONASE) 50 MCG/ACT nasal spray    Allergic rhinitis due to mold    Relevant Medications    fluticasone (FLONASE) 50 MCG/ACT nasal spray    Allergic rhinitis due to animal dander    Relevant Medications    fluticasone (FLONASE) 50 MCG/ACT nasal spray    Acute non-recurrent sinusitis, unspecified location - Primary     Colored mucus, sinus pressure, congestion for 5-6 days. Using Afrin. Flared asthma.     - Afrin for 2 additional days.   - Claritin-D as needed.   - Flonase 2 sprays/nostril daily for next 2 weeks.   - Doxycycline 100mg PO BID script provided for use for 10 days. She will only start if continues to have symptoms without improvement at days 10-14. Discussed in depth with patient.         Relevant Medications    doxycycline hyclate (VIBRA-TABS) 100 MG tablet    fluticasone  (FLONASE) 50 MCG/ACT nasal spray    predniSONE (DELTASONE) 10 MG tablet        Return in 3 months.     Chart documentation with Dragon Voice recognition Software. Although reviewed after completion, some words and grammatical errors may remain.    I have reviewed the note as documented above.  This accurately captures the substance of my conversation with the patient.    Video visit contact time  Video visit started at 1340  Video visit ended at 1332    Video-Visit Details     Type of service:  Video Visit     Originating Location (pt. Location): Home     Distant Location (provider location):  [facility]     Mode of Communication:  Video Conference via Doximity    Abundio Joaquin DO FAAAAI  Allergy/Immunology  [facility] [place], [place]      Again, thank you for allowing me to participate in the care of your patient.        Sincerely,        Abundio Joaquin DO

## 2021-01-26 NOTE — ASSESSMENT & PLAN NOTE
Moderate persistent asthma. Current exacerbation. On Symbicort 160/4.5 mcg 2 puff inhaled twice daily. has struggled with recurrent thrush. Spiriva not covered. Current sinusitis and flaring chest symptoms. Went on prednisone and helpful for chest symptoms.Flares with URI, exercise, allergies, cats, dogs, cold air, mowing grass, raking leaves.  Previously on Advair.  Symbicort resulted in better control.  Additionally she was on Singulair.  Not beneficial and discontinued.           -Symbicort to 160/4.5mcg 2 puff bid.   -Albuterol 2-4 puffs inhaled (use a spacer unless using a Proair Respiclick device) every 4 hours as needed for chest tightness, wheezing, shortness of breath and/or coughing.   -Total IgE elevated.   -absolute eosinophil count of 300  -Allergy testing positive for aspergillus, cat, dog, molds, grass, ragweed.   - Prednisone 30mg PO bid for 3 additional days.

## 2021-01-27 DIAGNOSIS — J01.90 ACUTE NON-RECURRENT SINUSITIS, UNSPECIFIED LOCATION: ICD-10-CM

## 2021-01-27 RX ORDER — FLUTICASONE PROPIONATE 50 MCG
2 SPRAY, SUSPENSION (ML) NASAL DAILY
Qty: 16 G | Refills: 4 | Status: SHIPPED | OUTPATIENT
Start: 2021-01-27 | End: 2022-04-12

## 2021-01-29 DIAGNOSIS — B00.52 HERPES SIMPLEX DISCIFORM KERATITIS: ICD-10-CM

## 2021-01-29 DIAGNOSIS — H04.123 DRY EYE SYNDROME OF BOTH EYES: ICD-10-CM

## 2021-01-31 RX ORDER — ACYCLOVIR 400 MG/1
400 TABLET ORAL 2 TIMES DAILY
Qty: 60 TABLET | Refills: 1 | Status: SHIPPED | OUTPATIENT
Start: 2021-01-31 | End: 2021-02-19

## 2021-01-31 NOTE — TELEPHONE ENCOUNTER
acyclovir (ZOVIRAX) 400 MG tablet  Last Written Prescription Date:  12/30/20  Last Fill Quantity: 60,   # refills: 0  Last Office Visit : 12/19/19  Future Office visit: 2/19/21    acyclovir 400 mg PO BID   -continue acyclovir 400 mg PO BID for ppx (this is also an appropriate dose for oral mucocutaneous lesions, which pt would like to continue)    Routing refill request to provider for review/approval because:

## 2021-02-16 ENCOUNTER — ANCILLARY PROCEDURE (OUTPATIENT)
Dept: MAMMOGRAPHY | Facility: CLINIC | Age: 66
End: 2021-02-16
Attending: NURSE PRACTITIONER
Payer: COMMERCIAL

## 2021-02-16 ENCOUNTER — OFFICE VISIT (OUTPATIENT)
Dept: OBGYN | Facility: CLINIC | Age: 66
End: 2021-02-16
Attending: NURSE PRACTITIONER
Payer: COMMERCIAL

## 2021-02-16 VITALS
BODY MASS INDEX: 29.92 KG/M2 | WEIGHT: 186.2 LBS | HEIGHT: 66 IN | DIASTOLIC BLOOD PRESSURE: 70 MMHG | HEART RATE: 96 BPM | SYSTOLIC BLOOD PRESSURE: 130 MMHG

## 2021-02-16 DIAGNOSIS — R30.0 DYSURIA: ICD-10-CM

## 2021-02-16 DIAGNOSIS — Z12.31 VISIT FOR SCREENING MAMMOGRAM: ICD-10-CM

## 2021-02-16 DIAGNOSIS — N95.1 SYMPTOMATIC MENOPAUSAL OR FEMALE CLIMACTERIC STATES: ICD-10-CM

## 2021-02-16 DIAGNOSIS — Z01.419 ENCOUNTER FOR GYNECOLOGICAL EXAMINATION WITHOUT ABNORMAL FINDING: Primary | ICD-10-CM

## 2021-02-16 DIAGNOSIS — Z13.820 SPECIAL SCREENING FOR OSTEOPOROSIS: ICD-10-CM

## 2021-02-16 DIAGNOSIS — R60.9 EDEMA, UNSPECIFIED TYPE: ICD-10-CM

## 2021-02-16 PROCEDURE — 99397 PER PM REEVAL EST PAT 65+ YR: CPT | Performed by: NURSE PRACTITIONER

## 2021-02-16 PROCEDURE — G0145 SCR C/V CYTO,THINLAYER,RESCR: HCPCS | Performed by: NURSE PRACTITIONER

## 2021-02-16 PROCEDURE — 77063 BREAST TOMOSYNTHESIS BI: CPT | Mod: TC | Performed by: RADIOLOGY

## 2021-02-16 PROCEDURE — 87624 HPV HI-RISK TYP POOLED RSLT: CPT | Performed by: NURSE PRACTITIONER

## 2021-02-16 PROCEDURE — 77067 SCR MAMMO BI INCL CAD: CPT | Mod: TC | Performed by: RADIOLOGY

## 2021-02-16 RX ORDER — PHENAZOPYRIDINE HYDROCHLORIDE 100 MG/1
200 TABLET, FILM COATED ORAL 3 TIMES DAILY PRN
Qty: 20 TABLET | Refills: 1 | Status: SHIPPED | OUTPATIENT
Start: 2021-02-16 | End: 2023-03-07

## 2021-02-16 RX ORDER — ESTRADIOL 0.03 MG/D
1 FILM, EXTENDED RELEASE TRANSDERMAL
Qty: 24 PATCH | Refills: 0 | Status: SHIPPED | OUTPATIENT
Start: 2021-02-18 | End: 2021-06-23

## 2021-02-16 RX ORDER — TRIAMTERENE AND HYDROCHLOROTHIAZIDE 37.5; 25 MG/1; MG/1
CAPSULE ORAL
Qty: 180 CAPSULE | Refills: 0 | Status: SHIPPED | OUTPATIENT
Start: 2021-02-16 | End: 2021-07-05

## 2021-02-16 ASSESSMENT — ANXIETY QUESTIONNAIRES
IF YOU CHECKED OFF ANY PROBLEMS ON THIS QUESTIONNAIRE, HOW DIFFICULT HAVE THESE PROBLEMS MADE IT FOR YOU TO DO YOUR WORK, TAKE CARE OF THINGS AT HOME, OR GET ALONG WITH OTHER PEOPLE: NOT DIFFICULT AT ALL
3. WORRYING TOO MUCH ABOUT DIFFERENT THINGS: NOT AT ALL
1. FEELING NERVOUS, ANXIOUS, OR ON EDGE: NOT AT ALL
GAD7 TOTAL SCORE: 0
6. BECOMING EASILY ANNOYED OR IRRITABLE: NOT AT ALL
2. NOT BEING ABLE TO STOP OR CONTROL WORRYING: NOT AT ALL
7. FEELING AFRAID AS IF SOMETHING AWFUL MIGHT HAPPEN: NOT AT ALL
5. BEING SO RESTLESS THAT IT IS HARD TO SIT STILL: NOT AT ALL

## 2021-02-16 ASSESSMENT — PATIENT HEALTH QUESTIONNAIRE - PHQ9
5. POOR APPETITE OR OVEREATING: NOT AT ALL
SUM OF ALL RESPONSES TO PHQ QUESTIONS 1-9: 0

## 2021-02-16 ASSESSMENT — MIFFLIN-ST. JEOR: SCORE: 1406.35

## 2021-02-16 NOTE — PROGRESS NOTES
Heather is a 65 year old  female who presents for annual exam.     Besides routine health maintenance,  she would like to discuss about weight gain and bladder problems. Patient states she had a sling but it's been getting worse each year and does not feel it is as effective as it used to be.    Do you have a Health Care Directive?: No, advance care planning information given to patient to review.  Patient declined advance care planning discussion at this time.    Fall risk:   Fallen 2 or more times in the past year?: No  Any fall with injury in the past year?: No    HPI: here for annual exam.  She has had a hysterectomy .  Was off her vivelle patch for awhile and was gaining weight and she did not feel right.  Has since restarted patch and is losing some weight and feels much more normal.  She does walk everyday and eats a healthy diet.  She also is just weaning off prednisone right now for bronchitis.  She was tested 4 times for covid and all were negative.  She is retiring this April so excited.  Has noticed with bad cough during bronchitis she is having some leaking.  Had a sling  and leaking was rare, could just be the cough with bronchitis. Going to see how it goes.  Did discuss if continues will see DR. Gonzalez for consult about sling.    Heat afib has been controlled.    The patient's PCP is none.      GYNECOLOGIC HISTORY:  No LMP recorded. Patient has had a hysterectomy..   reports that she has quit smoking. She has never used smokeless tobacco.    Patient is sexually active.  STD testing offered?  Declined  Last PHQ-9 score on record=   PHQ-9 SCORE 2021   PHQ-9 Total Score 0     Last GAD7 score on record=   ERICA-7 SCORE 10/24/2018 2019 2021   Total Score 0 0 0     Alcohol Score = 4    HEALTH MAINTENANCE:  Cholesterol:   Recent Labs   Lab Test 16  1055 14   CHOL 223* 219   HDL 60 74   * 131   TRIG 134 69      Last Mammo: 19, Result: Normal, Next Mammo:  Today   Pap: HPV negative  Lab Results   Component Value Date    PAP NIL 2019    PAP NIL 10/19/2017    PAP NIL 2016      DEXA:  2014  Colonoscopy:  Per patient, 2016 @ [facility], Result:  Normal, Next Colonoscopy: .    Health maintenance updated:  yes    HISTORY:  OB History    Para Term  AB Living   4 4 4 0 0 4   SAB TAB Ectopic Multiple Live Births   0 0 0 0 4      # Outcome Date GA Lbr Giuseppe/2nd Weight Sex Delivery Anes PTL Lv   4 Term            3 Term            2 Term            1 Term              Patient Active Problem List   Diagnosis     Moderate persistent asthma with acute exacerbation     Food allergy     Seasonal allergic rhinitis due to pollen     Allergic reaction to hymenoptera venom     Allergic rhinitis due to mold     Allergic rhinitis due to animal dander     Thrush     Acute non-recurrent sinusitis, unspecified location     Past Surgical History:   Procedure Laterality Date     CYSTOSCOPY  2012    Procedure:CYSTOSCOPY; Surgeon:RADHA COMER; Location:[facility]     GYN SURGERY      csection, infertility, hyst     HYSTERECTOMY       HYSTERECTOMY, PAP NO LONGER INDICATED       SLING TRANSVAGINAL  2012    Procedure:SLING TRANSVAGINAL; mini arc sling  and cystoscopy; Surgeon:RADHA COMER; Location:[facility]      Social History     Tobacco Use     Smoking status: Former Smoker     Smokeless tobacco: Never Used   Substance Use Topics     Alcohol use: Yes         Negative family history of: Glaucoma, Macular Degeneration            Current Outpatient Medications   Medication Sig     acyclovir (ZOVIRAX) 400 MG tablet Take 1 tablet (400 mg) by mouth 2 times daily Please keep appt 21     albuterol (ACCUNEB) 1.25 MG/3ML neb solution Take 2 vials (2.5 mg) by nebulization every 4 hours as needed for shortness of breath / dyspnea or wheezing     albuterol (PROVENTIL HFA: VENTOLIN HFA) 108 (90 BASE) MCG/ACT inhaler Inhale 2 puffs into the lungs  every 6 hours.     budesonide-formoterol (SYMBICORT) 160-4.5 MCG/ACT Inhaler Inhale 2 puffs into the lungs 2 times daily     Cholecalciferol (VITAMIN D-3 PO) Take 1 tablet by mouth     CLARITIN-D 24 HOUR  MG per 24 hr tablet TK 1 T PO  D     dexamethasone (DECADRON) 0.1 % ophthalmic solution Place 1 drop into the right eye every other day     EPINEPHrine (AUVI-Q) 0.3 MG/0.3ML injection 2-pack Inject 0.3 mLs (0.3 mg) into the muscle as needed for anaphylaxis     erythromycin (ROMYCIN) 5 MG/GM ophthalmic ointment 1 application to each eye at bedtime     [START ON 2/18/2021] estradiol (VIVELLE-DOT) 0.025 MG/24HR bi-weekly patch Place 1 patch onto the skin twice a week Appointment needed for additional refills.     fluticasone (FLONASE) 50 MCG/ACT nasal spray Spray 2 sprays into both nostrils daily     glucagon 1 MG kit Inject 1mg as needed for anaphylaxis that is non-responding to epinephrine.     Hypromellose (ARTIFICIAL TEARS OP) Apply 1 drop to eye as needed Usually every 30 minutes.     metoprolol tartrate (LOPRESSOR) 25 MG tablet      order for DME Equipment being ordered: Nebulizer     phenazopyridine (PYRIDIUM) 100 MG tablet Take 2 tablets (200 mg) by mouth 3 times daily as needed     triamterene-HCTZ (DYAZIDE) 37.5-25 MG capsule TAKE 2 CAPSULES BY MOUTH AS NEEDED FOR FLUID RETENTION     No current facility-administered medications for this visit.        Allergies   Allergen Reactions     Latex Anaphylaxis     Xray Dye  [Contrast Dye] Hives     Pt states she had allergic reaction to xray day several years ago (hives) Pt has been successfully premedicated for contrast allergy without problem.      Latex      Sulfa Drugs      Penicillins Rash     Other reaction(s): Swelling, lips/tongue  Childhood rash       Past medical, surgical, social and family history were reviewed and updated in EPIC.    ROS:   12 point review of systems negative other than symptoms noted below or in the HPI.  Genitourinary:  "Incontinence      EXAM:  /70 (BP Location: Right arm, Patient Position: Sitting, Cuff Size: Adult Regular)   Pulse 96   Ht 1.676 m (5' 6\")   Wt 84.5 kg (186 lb 3.2 oz)   Breastfeeding No   BMI 30.05 kg/m     BMI: Body mass index is 30.05 kg/m .    EXAM:  Constitutional: Appearance: Well nourished, well developed alert, in no acute distress  Neck:  Lymph Nodes:  No lymphadenopathy present    Thyroid:  Gland size normal, nontender, no nodules or masses present  on palpation  Chest:  Respiratory Effort:  Breathing unlabored  Cardiovascular:Heart    Auscultation:  Regular rate, normal rhythm, no murmurs present  Breasts: Inspection of Breasts:  No lymphadenopathy present., Palpation of Breasts and Axillae:  No masses present on palpation, no breast tenderness., Axillary Lymph Nodes:  No lymphadenopathy present. and No nodularity, asymmetry or nipple discharge bilaterally.  Gastrointestinal:  Abdominal Examination:  Abdomen nontender to palpation, tone normal without     rigidity or guarding, no masses present, umbilicus without lesions    Liver and speen:  No hepatomegaly present, liver nontender to palpation    Hernias:  No hernias present  Lymphatic: Lymph Nodes:  No other lymphadenopathy present  Skin:  General Inspection:  No rashes present, no lesions present, no areas of  discoloration.    Genitalia and Groin:  No rashes present, no lesions present, no areas of  discoloration, no masses present  Neurologic/Psychiatric:    Mental Status:  Oriented X3     Pelvic Exam:  External Genitalia:     Normal appearance for age, no discharge present, no tenderness present, no inflammatory lesions present, color normal  Vagina:     Normal vaginal vault without central or paravaginal defects, no discharge present, no inflammatory lesions present, no masses present  Bladder:     Nontender to palpation  Urethra:   Urethral Body:  Urethra palpation normal, urethra structural support normal   Urethral Meatus:  No " erythema or lesions present  Cervix:     Surgically absent  Uterus:     Surgically absent  Adnexa:     Surgically absent  Perineum:     Perineum within normal limits, no evidence of trauma, no rashes or skin lesions present  Anus:     Anus within normal limits, no hemorrhoids present  Inguinal Lymph Nodes:     No lymphadenopathy present    COUNSELING:   Reviewed preventive health counseling, as reflected in patient instructions       Regular exercise       Healthy diet/nutrition       Osteoporosis prevention/bone health       (Valerie)menopause management    BMI:  Body mass index is 30.05 kg/m .     reports that she has quit smoking. She has never used smokeless tobacco.      ASSESSMENT:  65 year old female with satisfactory annual exam.    ICD-10-CM    1. Encounter for gynecological examination without abnormal finding  Z01.419 Pap imaged thin layer screen with HPV - recommended age 30 - 65 years (select HPV order below)     HPV High Risk Types DNA Cervical   2. Symptomatic menopausal or female climacteric states  N95.1 estradiol (VIVELLE-DOT) 0.025 MG/24HR bi-weekly patch   3. Edema, unspecified type  R60.9 triamterene-HCTZ (DYAZIDE) 37.5-25 MG capsule   4. Dysuria  R30.0 phenazopyridine (PYRIDIUM) 100 MG tablet   5. Special screening for osteoporosis  Z13.820 DX Hip/Pelvis/Spine       PLAN:  Normal Gyn exam.  Ok to continue HRT for 1 year.  If leaking continues to return for consult with Dr. Gonzalez regarding sling.  Return for Dexa scan  Return 1 year for annual.  Discussed pap guidelines with patient and she is requesting pap yearly.,    OMID Littlejohn CNP

## 2021-02-17 ASSESSMENT — ANXIETY QUESTIONNAIRES: GAD7 TOTAL SCORE: 0

## 2021-02-19 ENCOUNTER — OFFICE VISIT (OUTPATIENT)
Dept: OPHTHALMOLOGY | Facility: CLINIC | Age: 66
End: 2021-02-19
Attending: OPHTHALMOLOGY
Payer: COMMERCIAL

## 2021-02-19 DIAGNOSIS — B00.52 HERPES SIMPLEX DISCIFORM KERATITIS: Primary | ICD-10-CM

## 2021-02-19 DIAGNOSIS — H04.123 DRY EYE SYNDROME OF BOTH EYES: ICD-10-CM

## 2021-02-19 DIAGNOSIS — H16.9 KERATITIS: ICD-10-CM

## 2021-02-19 DIAGNOSIS — H02.59 FLOPPY EYELID SYNDROME OF BOTH EYES: ICD-10-CM

## 2021-02-19 LAB
COPATH REPORT: NORMAL
PAP: NORMAL

## 2021-02-19 PROCEDURE — 92015 DETERMINE REFRACTIVE STATE: CPT

## 2021-02-19 PROCEDURE — G0463 HOSPITAL OUTPT CLINIC VISIT: HCPCS | Mod: 25

## 2021-02-19 PROCEDURE — 99214 OFFICE O/P EST MOD 30 MIN: CPT | Mod: GC | Performed by: OPHTHALMOLOGY

## 2021-02-19 RX ORDER — ACYCLOVIR 400 MG/1
400 TABLET ORAL 2 TIMES DAILY
Qty: 60 TABLET | Refills: 11 | Status: SHIPPED | OUTPATIENT
Start: 2021-02-19 | End: 2022-02-22

## 2021-02-19 RX ORDER — ERYTHROMYCIN 5 MG/G
OINTMENT OPHTHALMIC
Qty: 3.5 G | Refills: 11 | Status: SHIPPED | OUTPATIENT
Start: 2021-02-19 | End: 2022-02-22

## 2021-02-19 ASSESSMENT — REFRACTION_MANIFEST
OD_AXIS: 019
OS_CYLINDER: +0.25
OD_CYLINDER: +0.25
OS_SPHERE: +5.25
OD_SPHERE: +5.25
OS_ADD: +2.25
OD_ADD: +2.25
OS_AXIS: 040

## 2021-02-19 ASSESSMENT — CONF VISUAL FIELD
METHOD: COUNTING FINGERS
OS_NORMAL: 1
OD_NORMAL: 1

## 2021-02-19 ASSESSMENT — EXTERNAL EXAM - RIGHT EYE: OD_EXAM: NO VESICULAR LESIONS

## 2021-02-19 ASSESSMENT — VISUAL ACUITY
METHOD: SNELLEN - LINEAR
OD_PH_CC: 20/25
OS_CC: 20/20
OD_PH_CC+: -3
OD_CC: 20/40
OS_CC+: -3

## 2021-02-19 ASSESSMENT — REFRACTION_WEARINGRX
OD_ADD: +2.50
OS_AXIS: 040
OD_AXIS: 019
OS_CYLINDER: +0.50
SPECS_TYPE: PAL
OS_ADD: +2.50
OD_CYLINDER: +0.25
OD_SPHERE: +6.25
OS_SPHERE: +6.00

## 2021-02-19 ASSESSMENT — TONOMETRY
OD_IOP_MMHG: 12
IOP_METHOD: ICARE
OS_IOP_MMHG: 10

## 2021-02-19 ASSESSMENT — EXTERNAL EXAM - LEFT EYE: OS_EXAM: NO VESICULAR LESIONS

## 2021-02-19 NOTE — NURSING NOTE
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 She says she has been feeling discomfort RE that lasted about two weeks but has now resolved. The two days RE lids have been twitching, and she does not know why.     Hernandez Honeycutt COT 9:32 AM February 19, 2021

## 2021-02-19 NOTE — PROGRESS NOTES
HPI: Haether Wilkinson is a 65 year old female who presents for fuv.    Interval hx:   -Reports overall doing well. Three weeks ago noticed some irritation symptoms. No pain. She  Has since re-started daily dexamethasone drops which helped a lot. Now her sx's are resolved. No new flashes, floaters, or diplopia. No pain, redness, or tearing.     POHx:  -contact lens wear (history, not current)  -Herpetic keratitis OD (6/15/16)    Current Meds:   -acyclovir 400 mg PO BID  (this is also an appropriate dose for oral mucocutaneous lesions, which pt would like to continue)  -dexamethasone drops every day right eye only   -erythromycin ointment QHS right eye - lubrication use  -PF AT 3-4x daily OU    Assessment & Plan     # Keratitis - Right Eye - Resolved, no corneal scar. doing well  # Dry eye syndrome right eye > left eye   # Floppy eyelid syndrome  # Oral HSV, resolved  - herpetic keratitis previously  -likely worsened with other confounding issues:  -incomplete blink, floppy eyelids with exposure pattern OU, hx of cold sores with suspect viral keratitis recently and previously weekly CL use (>15 years)       -permanent silicone plug in place RIGHT LOWER LID   -continue acyclovir 400 mg PO BID for ppx (this is also an appropriate dose for oral mucocutaneous lesions, which pt would like to continue) - No cold sores since started   -continue dexamethasone 0.1% every day PRN irritation. Discussed risk of prolonged steroid use.    -continue PF AT QID and as needed    -continue EES or Refresh PM at bedtime/flat nightmask   -recommend using eyelid scrubs to remove makeup nightly    Return to clinic: Annually, sooner if problems     Elizabeth Garrett MD  Cornea & External Disease Fellow    Attending Physician Attestation:  Complete documentation of historical and exam elements from today's encounter can be found in the full encounter summary report (not reduplicated in this progress note).  I personally obtained the chief  complaint(s) and history of present illness.  I confirmed and edited as necessary the review of systems, past medical/surgical history, family history, social history, and examination findings as documented by others; and I examined the patient myself.  I personally reviewed the relevant tests, images, and reports as documented above.  I formulated and edited as necessary the assessment and plan and discussed the findings and management plan with the patient and family. - Alexander Rodriguez MD

## 2021-02-19 NOTE — PATIENT INSTRUCTIONS
OK to keep using dexamethasone drops as needed to the right eye for flares. Try artificial tear FIRST for any irritation to see if this helps. If you are using the dexamethasone drop a lot, make sure to get your eye pressure checked.    Continue erythromycin ointment at bedtime.

## 2021-03-10 ENCOUNTER — MYC MEDICAL ADVICE (OUTPATIENT)
Dept: ALLERGY | Facility: OTHER | Age: 66
End: 2021-03-10

## 2021-03-10 DIAGNOSIS — B37.0 THRUSH: Primary | ICD-10-CM

## 2021-03-11 RX ORDER — FLUCONAZOLE 100 MG/1
100 TABLET ORAL DAILY
Qty: 15 TABLET | Refills: 0 | Status: SHIPPED | OUTPATIENT
Start: 2021-03-11 | End: 2021-03-26

## 2021-03-25 DIAGNOSIS — H10.9 RHINOCONJUNCTIVITIS: ICD-10-CM

## 2021-03-25 DIAGNOSIS — J31.0 RHINOCONJUNCTIVITIS: ICD-10-CM

## 2021-03-25 DIAGNOSIS — J45.40 MODERATE PERSISTENT ASTHMA WITHOUT COMPLICATION: ICD-10-CM

## 2021-03-25 RX ORDER — MONTELUKAST SODIUM 10 MG/1
10 TABLET ORAL AT BEDTIME
Qty: 30 TABLET | Refills: 11 | Status: SHIPPED | OUTPATIENT
Start: 2021-03-25 | End: 2021-07-07

## 2021-03-25 NOTE — TELEPHONE ENCOUNTER
Montelukast 10mg tablets being requested    Pharmacy [facility]  Take 1 tablet 10mg  By mouth at bedtime.   Not on current medlist.   Physician:  Abundio HILLIARD (RT)

## 2021-04-06 ENCOUNTER — MYC MEDICAL ADVICE (OUTPATIENT)
Dept: ALLERGY | Facility: OTHER | Age: 66
End: 2021-04-06

## 2021-04-06 DIAGNOSIS — J45.40 MODERATE PERSISTENT ASTHMA WITHOUT COMPLICATION: ICD-10-CM

## 2021-04-06 RX ORDER — BUDESONIDE AND FORMOTEROL FUMARATE DIHYDRATE 160; 4.5 UG/1; UG/1
2 AEROSOL RESPIRATORY (INHALATION) 2 TIMES DAILY
Qty: 10.2 G | Refills: 1 | Status: SHIPPED | OUTPATIENT
Start: 2021-04-06 | End: 2021-07-07

## 2021-04-06 NOTE — CONFIDENTIAL NOTE
Requested Prescriptions   Pending Prescriptions Disp Refills     budesonide-formoterol (SYMBICORT) 160-4.5 MCG/ACT Inhaler 10.2 g 1     Sig: Inhale 2 puffs into the lungs 2 times daily       There is no refill protocol information for this order        Signed Prescriptions:                        Disp   Refills    budesonide-formoterol (SYMBICORT) 160-4.5 *10.2 g 1        Sig: Inhale 2 puffs into the lungs 2 times daily  Authorizing Provider: MARIAMA JUÁREZ  Ordering User: SHAQUILLE IBARRA RN refilled medication per [facility] Refill Protocol.     Shaquille Ibarra RN

## 2021-04-07 ENCOUNTER — OFFICE VISIT (OUTPATIENT)
Dept: ALLERGY | Facility: OTHER | Age: 66
End: 2021-04-07
Payer: COMMERCIAL

## 2021-04-07 VITALS
HEIGHT: 66 IN | BODY MASS INDEX: 29.09 KG/M2 | HEART RATE: 74 BPM | DIASTOLIC BLOOD PRESSURE: 64 MMHG | OXYGEN SATURATION: 95 % | SYSTOLIC BLOOD PRESSURE: 102 MMHG | WEIGHT: 181 LBS

## 2021-04-07 DIAGNOSIS — J30.89 ALLERGIC RHINITIS DUE TO MOLD: ICD-10-CM

## 2021-04-07 DIAGNOSIS — Z91.040 LATEX ALLERGY: ICD-10-CM

## 2021-04-07 DIAGNOSIS — J45.41 MODERATE PERSISTENT ASTHMA WITH ACUTE EXACERBATION: Primary | ICD-10-CM

## 2021-04-07 DIAGNOSIS — Z91.018 FOOD ALLERGY: ICD-10-CM

## 2021-04-07 DIAGNOSIS — J30.81 ALLERGIC RHINITIS DUE TO ANIMAL DANDER: ICD-10-CM

## 2021-04-07 DIAGNOSIS — J30.1 SEASONAL ALLERGIC RHINITIS DUE TO POLLEN: ICD-10-CM

## 2021-04-07 PROBLEM — J01.90 ACUTE NON-RECURRENT SINUSITIS, UNSPECIFIED LOCATION: Status: RESOLVED | Noted: 2021-01-26 | Resolved: 2021-04-07

## 2021-04-07 PROCEDURE — 99214 OFFICE O/P EST MOD 30 MIN: CPT | Performed by: ALLERGY & IMMUNOLOGY

## 2021-04-07 RX ORDER — AZITHROMYCIN 250 MG/1
TABLET, FILM COATED ORAL
Qty: 6 TABLET | Refills: 0 | Status: SHIPPED | OUTPATIENT
Start: 2021-04-07 | End: 2021-04-12

## 2021-04-07 ASSESSMENT — MIFFLIN-ST. JEOR: SCORE: 1382.76

## 2021-04-07 NOTE — PATIENT INSTRUCTIONS
Allergy Staff Appt Hours Shot Hours Locations    Physician     Abundio Joaquin DO       Support Staff     MAURY Yanes CMA  Tuesday:        [place] 7-5 Wednesday:        [place]: 7-5 Thursday:                    [place] 7-6     Friday:  [place]  7-2   [place]        Thursday: 8-5:20        Friday: 7-12     [place]        Tuesday: 7- 3:20 Wednesday: 7-4:20     [place] Monday: 7-2:20 Tuesday: 9-5:20         [facility]  [address] [place] [address]  Appt Line: [phone]  Allergy RN:  [phone]    [facility]  [address] [place] [address]  Appt Line: [phone]  Allergy RN:  [phone]       Important Scheduling Information  Aspirin Desensitization: Appt will last 2 clinic days. Please call the Allergy RN line for your clinic to schedule. Discontinue antihistamines 7 days prior to the appointment.     Food Challenges: Appt will last 3-4 hours. Please call the Allergy RN line for your clinic to schedule. Discontinue antihistamines 7 days prior to the appointment.     Penicillin Testing: Appt will last 2-3 hours. Please call the Allergy RN line for your clinic to schedule. Discontinue antihistamines 7 days prior to the appointment.     Skin Testing: Appt will about 40 minutes. Call the appointment line for your clinic to schedule. Discontinue antihistamines 7 days prior to the appointment.     Venom Testing: Appt will last 2-3 hours. Please call the Allergy RN line for your clinic to schedule. Discontinue antihistamines 7 days prior to the appointment.     Thank you for trusting us with your Allergy, Asthma, and Immunology care. Please feel free to contact us with any questions or concerns you may have.      - Claritin-D daily.   - Singulair 10mg by mouth daily at night.   - Symbicort 160/4.5mcg 2 puffs inhaled twice daily with a spacer.   - Albuterol 2-4 puffs inhaled (use a spacer unless using a Proair Respiclick device) every 4  hours as needed for chest tightness, wheezing, shortness of breath and/or coughing.   - Albuterol 2-4 puffs inhaled (use spacer if not using Proair Respiclick device) 15-20 minutes prior to physical activity.   Please ensure warm up period prior to exercise.   - Z pack for 5 days.   - Return in 3 months.

## 2021-04-07 NOTE — ASSESSMENT & PLAN NOTE
Moderate persistent asthma. Cough since covid. Albuterol helpful for cough. Had sinusitis at last visit. On Symbicort 160/4.5 mcg 2 puff inhaled twice daily. has struggled with recurrent thrush. Spiriva not covered. Flares with URI, exercise, allergies, cats, dogs, cold air, mowing grass, raking leaves.  Previously on Advair.  Symbicort resulted in better control.       -Symbicort to 160/4.5mcg 2 puff bid.   -Singulair 10mg by mouth daily at night.   -Albuterol 2-4 puffs inhaled (use a spacer unless using a Proair Respiclick device) every 4 hours as needed for chest tightness, wheezing, shortness of breath and/or coughing.   -Total IgE elevated.   -absolute eosinophil count of 300  -Allergy testing positive for aspergillus, cat, dog, molds, grass, ragweed.   -zpak for 5 days given persistent cough. Anti-inflammatory.   -if cough persist would get chest x-ray

## 2021-04-07 NOTE — LETTER
4/7/2021         RE: Heather Wilkinson  [address] [facility] [address]        Dear Colleague,    Thank you for referring your patient, Heather Wilkinson, to the [facility]. Please see a copy of my visit note below.    Heather Wilkinson is a 65 year old White female with previous medical history significant for asthma and allergic rhinitis who returns for a follow up visit.     Patient returns for follow-up.  She has been on Symbicort 160/4.5 mcg 2 puff inhaled twice daily.  Previously prescribed Spiriva but not covered.  At last visit she had sinusitis and this caused flaring of her chest symptoms.  She reports that since having Covid and late 2020 she has had chronic cough.  Cough is improving but still daily.  Albuterol has been beneficial for the cough.  Prednisone has been beneficial for the cough.  Denies wheezing or tightness in chest.  Some shortness of breath with physical activity.  Pretreating with albuterol prior to physical activity.  Historically asthma flares with URI, exercise, allergies, cats, dogs, cold air, mowing grass, raking leaves.  She is allergic.  Positive allergy testing for Aspergillus, cat, dog, molds, grass and ragweed.  Currently denies any nasal or ocular symptoms.  On Singulair and Claritin-D.  No use of nasal corticosteroid.  History of latex allergy.  No intermittent exposure.    ACT Total Scores 4/7/2021   ACT TOTAL SCORE (Goal Greater than or Equal to 20) 22   In the past 12 months, how many times did you visit the emergency room for your asthma without being admitted to the hospital? 0   In the past 12 months, how many times were you hospitalized overnight because of your asthma? 0                Past Medical History:   Diagnosis Date     Allergic rhinitis      Asthma      Atrial fibrillation (H)      Cough      Fluid retention      Tachycardia      Family History   Problem Relation Age of Onset     Glaucoma No family hx of      Macular  Degeneration No family hx of      Past Surgical History:   Procedure Laterality Date     CYSTOSCOPY  5/11/2012    Procedure:CYSTOSCOPY; Surgeon:RADHA COMER; Location:[facility]     GYN SURGERY      csection, infertility, hyst     HYSTERECTOMY  1992     HYSTERECTOMY, PAP NO LONGER INDICATED       SLING TRANSVAGINAL  5/11/2012    Procedure:SLING TRANSVAGINAL; mini arc sling  and cystoscopy; Surgeon:RADHA COMER; Location:[facility]       REVIEW OF SYSTEMS:  Nose: negative for snoring.negative for changes in smell. negative for drainage.   Eyes: negative for eye watering. negative for eye itching. negative for vision changes. negative for eye redness.  Throat: negative for hoarseness. negative for sore throat. negative for trouble swallowing.   Lungs: negative for shortness of breath.negative for wheezing. positive  for sputum production.   Integumentary: negative for rash. negative for scaling. negative for nail changes.       Current Outpatient Medications:      acyclovir (ZOVIRAX) 400 MG tablet, Take 1 tablet (400 mg) by mouth 2 times daily Please keep appt 2/19/21, Disp: 60 tablet, Rfl: 11     albuterol (ACCUNEB) 1.25 MG/3ML neb solution, Take 2 vials (2.5 mg) by nebulization every 4 hours as needed for shortness of breath / dyspnea or wheezing, Disp: 30 vial, Rfl: 4     albuterol (PROVENTIL HFA: VENTOLIN HFA) 108 (90 BASE) MCG/ACT inhaler, Inhale 2 puffs into the lungs every 6 hours., Disp: , Rfl:      azithromycin (ZITHROMAX) 250 MG tablet, Take 2 tablets (500 mg) by mouth daily for 1 day, THEN 1 tablet (250 mg) daily for 4 days., Disp: 6 tablet, Rfl: 0     budesonide-formoterol (SYMBICORT) 160-4.5 MCG/ACT Inhaler, Inhale 2 puffs into the lungs 2 times daily, Disp: 10.2 g, Rfl: 1     Cholecalciferol (VITAMIN D-3 PO), Take 1 tablet by mouth, Disp: , Rfl:      CLARITIN-D 24 HOUR  MG per 24 hr tablet, TK 1 T PO  D, Disp: , Rfl: 0     dexamethasone (DECADRON) 0.1 % ophthalmic solution, Place 1 drop  into the right eye every other day, Disp: 5 mL, Rfl: 6     EPINEPHrine (AUVI-Q) 0.3 MG/0.3ML injection 2-pack, Inject 0.3 mLs (0.3 mg) into the muscle as needed for anaphylaxis, Disp: 2 each, Rfl: 1     erythromycin (ROMYCIN) 5 MG/GM ophthalmic ointment, 1 application to each eye at bedtime, Disp: 3.5 g, Rfl: 11     estradiol (VIVELLE-DOT) 0.025 MG/24HR bi-weekly patch, Place 1 patch onto the skin twice a week Appointment needed for additional refills., Disp: 24 patch, Rfl: 0     fluticasone (FLONASE) 50 MCG/ACT nasal spray, Spray 2 sprays into both nostrils daily, Disp: 16 g, Rfl: 4     glucagon 1 MG kit, Inject 1mg as needed for anaphylaxis that is non-responding to epinephrine., Disp: 1 mg, Rfl: 0     Hypromellose (ARTIFICIAL TEARS OP), Apply 1 drop to eye as needed Usually every 30 minutes., Disp: , Rfl:      metoprolol tartrate (LOPRESSOR) 25 MG tablet, , Disp: , Rfl:      montelukast (SINGULAIR) 10 MG tablet, Take 1 tablet (10 mg) by mouth At Bedtime, Disp: 30 tablet, Rfl: 11     order for DME, Equipment being ordered: Nebulizer, Disp: 1 Box, Rfl: 0     phenazopyridine (PYRIDIUM) 100 MG tablet, Take 2 tablets (200 mg) by mouth 3 times daily as needed, Disp: 20 tablet, Rfl: 1     triamterene-HCTZ (DYAZIDE) 37.5-25 MG capsule, TAKE 2 CAPSULES BY MOUTH AS NEEDED FOR FLUID RETENTION, Disp: 180 capsule, Rfl: 0  Immunization History   Administered Date(s) Administered     COVID-19,PF,Pfizer 03/05/2021, 03/26/2021     DTaP, Unspecified 09/05/2014     Pneumococcal 23 valent 10/16/2013     TD (ADULT, 7+) 04/01/2003     Allergies   Allergen Reactions     Latex Anaphylaxis     Xray Dye  [Contrast Dye] Hives     Pt states she had allergic reaction to xray day several years ago (hives) Pt has been successfully premedicated for contrast allergy without problem.      Sulfa Drugs      Penicillins Rash     Other reaction(s): Swelling, lips/tongue  Childhood rash         EXAM:   Constitutional:  Appears well-developed and  well-nourished. No distress.   HEENT:   Head: Normocephalic.   Nasal tissue pink and normal appearing.  No rhinorrhea noted.    Eyes: Conjunctivae are non-erythematous   Cardiovascular: Normal rate, regular rhythm and normal heart sounds. Exam reveals no gallop and no friction rub.   No murmur heard.  Respiratory: Effort normal and breath sounds normal. No respiratory distress. No wheezes. No rales.   Musculoskeletal: Normal range of motion.   Neuro: Oriented to person, place, and time.  Skin: Skin is warm and dry. No rash noted.   Psychiatric: Normal mood and affect.     Nursing note and vitals reviewed.    ASSESSMENT/PLAN:  Problem List Items Addressed This Visit        Respiratory    Moderate persistent asthma with acute exacerbation - Primary     Moderate persistent asthma. Cough since covid. Albuterol helpful for cough. Had sinusitis at last visit. On Symbicort 160/4.5 mcg 2 puff inhaled twice daily. has struggled with recurrent thrush. Spiriva not covered. Flares with URI, exercise, allergies, cats, dogs, cold air, mowing grass, raking leaves.  Previously on Advair.  Symbicort resulted in better control.       -Symbicort to 160/4.5mcg 2 puff bid.   -Singulair 10mg by mouth daily at night.   -Albuterol 2-4 puffs inhaled (use a spacer unless using a Proair Respiclick device) every 4 hours as needed for chest tightness, wheezing, shortness of breath and/or coughing.   -Total IgE elevated.   -absolute eosinophil count of 300  -Allergy testing positive for aspergillus, cat, dog, molds, grass, ragweed.   -zpak for 5 days given persistent cough. Anti-inflammatory.   -if cough persist would get chest x-ray         Relevant Medications    azithromycin (ZITHROMAX) 250 MG tablet    Other Relevant Orders    Pulmonary Function Test    Seasonal allergic rhinitis due to pollen     History of allergic rhinoconjunctivitis.  Has been on allergen immunotherapy in the past.  Not recently.  Stopped immunotherapy secondary to  systemic reactions.  Claritin-D is used as needed for nasal symptoms.  Significantly beneficial.     Allergy testing positive for aspergillus, cat, dog, molds, grass, ragweed.      -Claritin-D as needed.   -Singulair 10mg by mouth daily at night.          Allergic rhinitis due to mold    Allergic rhinitis due to animal dander       Other    Food allergy     Symptoms with avocado, banana, latex.    Positive serum igE for avocado, and latex.    - Anaphylaxis action plan was provided.  - Prescription for epinephrine was provided.  -She is on beta-blocker.  Discussed with patient that beta-blocker could interfere with epinephrine.  Prescription for glucagon was provided.  Instructed when and how to use.         Latex allergy          Chart documentation with Dragon Voice recognition Software. Although reviewed after completion, some words and grammatical errors may remain.    Abundio Joaquin DO FAAAAI  Medical Director for Allergy/Immunology at [place], [place]        Again, thank you for allowing me to participate in the care of your patient.        Sincerely,        Abundio Joaquin DO

## 2021-04-07 NOTE — ASSESSMENT & PLAN NOTE
History of allergic rhinoconjunctivitis.  Has been on allergen immunotherapy in the past.  Not recently.  Stopped immunotherapy secondary to systemic reactions.  Claritin-D is used as needed for nasal symptoms.  Significantly beneficial.     Allergy testing positive for aspergillus, cat, dog, molds, grass, ragweed.      -Claritin-D as needed.   -Singulair 10mg by mouth daily at night.

## 2021-04-07 NOTE — PROGRESS NOTES
Heather Wilkinson is a 65 year old White female with previous medical history significant for asthma and allergic rhinitis who returns for a follow up visit.     Patient returns for follow-up.  She has been on Symbicort 160/4.5 mcg 2 puff inhaled twice daily.  Previously prescribed Spiriva but not covered.  At last visit she had sinusitis and this caused flaring of her chest symptoms.  She reports that since having Covid and late 2020 she has had chronic cough.  Cough is improving but still daily.  Albuterol has been beneficial for the cough.  Prednisone has been beneficial for the cough.  Denies wheezing or tightness in chest.  Some shortness of breath with physical activity.  Pretreating with albuterol prior to physical activity.  Historically asthma flares with URI, exercise, allergies, cats, dogs, cold air, mowing grass, raking leaves.  She is allergic.  Positive allergy testing for Aspergillus, cat, dog, molds, grass and ragweed.  Currently denies any nasal or ocular symptoms.  On Singulair and Claritin-D.  No use of nasal corticosteroid.  History of latex allergy.  No intermittent exposure.    ACT Total Scores 4/7/2021   ACT TOTAL SCORE (Goal Greater than or Equal to 20) 22   In the past 12 months, how many times did you visit the emergency room for your asthma without being admitted to the hospital? 0   In the past 12 months, how many times were you hospitalized overnight because of your asthma? 0                Past Medical History:   Diagnosis Date     Allergic rhinitis      Asthma      Atrial fibrillation (H)      Cough      Fluid retention      Tachycardia      Family History   Problem Relation Age of Onset     Glaucoma No family hx of      Macular Degeneration No family hx of      Past Surgical History:   Procedure Laterality Date     CYSTOSCOPY  5/11/2012    Procedure:CYSTOSCOPY; Surgeon:RADHA COMER; Location:[facility]     GYN SURGERY      csection, infertility, hyst     HYSTERECTOMY  1992      HYSTERECTOMY, PAP NO LONGER INDICATED       SLING TRANSVAGINAL  5/11/2012    Procedure:SLING TRANSVAGINAL; mini arc sling  and cystoscopy; Surgeon:RADHA COMER; Location:[facility]       REVIEW OF SYSTEMS:  Nose: negative for snoring.negative for changes in smell. negative for drainage.   Eyes: negative for eye watering. negative for eye itching. negative for vision changes. negative for eye redness.  Throat: negative for hoarseness. negative for sore throat. negative for trouble swallowing.   Lungs: negative for shortness of breath.negative for wheezing. positive  for sputum production.   Integumentary: negative for rash. negative for scaling. negative for nail changes.       Current Outpatient Medications:      acyclovir (ZOVIRAX) 400 MG tablet, Take 1 tablet (400 mg) by mouth 2 times daily Please keep appt 2/19/21, Disp: 60 tablet, Rfl: 11     albuterol (ACCUNEB) 1.25 MG/3ML neb solution, Take 2 vials (2.5 mg) by nebulization every 4 hours as needed for shortness of breath / dyspnea or wheezing, Disp: 30 vial, Rfl: 4     albuterol (PROVENTIL HFA: VENTOLIN HFA) 108 (90 BASE) MCG/ACT inhaler, Inhale 2 puffs into the lungs every 6 hours., Disp: , Rfl:      azithromycin (ZITHROMAX) 250 MG tablet, Take 2 tablets (500 mg) by mouth daily for 1 day, THEN 1 tablet (250 mg) daily for 4 days., Disp: 6 tablet, Rfl: 0     budesonide-formoterol (SYMBICORT) 160-4.5 MCG/ACT Inhaler, Inhale 2 puffs into the lungs 2 times daily, Disp: 10.2 g, Rfl: 1     Cholecalciferol (VITAMIN D-3 PO), Take 1 tablet by mouth, Disp: , Rfl:      CLARITIN-D 24 HOUR  MG per 24 hr tablet, TK 1 T PO  D, Disp: , Rfl: 0     dexamethasone (DECADRON) 0.1 % ophthalmic solution, Place 1 drop into the right eye every other day, Disp: 5 mL, Rfl: 6     EPINEPHrine (AUVI-Q) 0.3 MG/0.3ML injection 2-pack, Inject 0.3 mLs (0.3 mg) into the muscle as needed for anaphylaxis, Disp: 2 each, Rfl: 1     erythromycin (ROMYCIN) 5 MG/GM ophthalmic ointment, 1  application to each eye at bedtime, Disp: 3.5 g, Rfl: 11     estradiol (VIVELLE-DOT) 0.025 MG/24HR bi-weekly patch, Place 1 patch onto the skin twice a week Appointment needed for additional refills., Disp: 24 patch, Rfl: 0     fluticasone (FLONASE) 50 MCG/ACT nasal spray, Spray 2 sprays into both nostrils daily, Disp: 16 g, Rfl: 4     glucagon 1 MG kit, Inject 1mg as needed for anaphylaxis that is non-responding to epinephrine., Disp: 1 mg, Rfl: 0     Hypromellose (ARTIFICIAL TEARS OP), Apply 1 drop to eye as needed Usually every 30 minutes., Disp: , Rfl:      metoprolol tartrate (LOPRESSOR) 25 MG tablet, , Disp: , Rfl:      montelukast (SINGULAIR) 10 MG tablet, Take 1 tablet (10 mg) by mouth At Bedtime, Disp: 30 tablet, Rfl: 11     order for DME, Equipment being ordered: Nebulizer, Disp: 1 Box, Rfl: 0     phenazopyridine (PYRIDIUM) 100 MG tablet, Take 2 tablets (200 mg) by mouth 3 times daily as needed, Disp: 20 tablet, Rfl: 1     triamterene-HCTZ (DYAZIDE) 37.5-25 MG capsule, TAKE 2 CAPSULES BY MOUTH AS NEEDED FOR FLUID RETENTION, Disp: 180 capsule, Rfl: 0  Immunization History   Administered Date(s) Administered     COVID-19,PF,Pfizer 03/05/2021, 03/26/2021     DTaP, Unspecified 09/05/2014     Pneumococcal 23 valent 10/16/2013     TD (ADULT, 7+) 04/01/2003     Allergies   Allergen Reactions     Latex Anaphylaxis     Xray Dye  [Contrast Dye] Hives     Pt states she had allergic reaction to xray day several years ago (hives) Pt has been successfully premedicated for contrast allergy without problem.      Sulfa Drugs      Penicillins Rash     Other reaction(s): Swelling, lips/tongue  Childhood rash         EXAM:   Constitutional:  Appears well-developed and well-nourished. No distress.   HEENT:   Head: Normocephalic.   Nasal tissue pink and normal appearing.  No rhinorrhea noted.    Eyes: Conjunctivae are non-erythematous   Cardiovascular: Normal rate, regular rhythm and normal heart sounds. Exam reveals no gallop  and no friction rub.   No murmur heard.  Respiratory: Effort normal and breath sounds normal. No respiratory distress. No wheezes. No rales.   Musculoskeletal: Normal range of motion.   Neuro: Oriented to person, place, and time.  Skin: Skin is warm and dry. No rash noted.   Psychiatric: Normal mood and affect.     Nursing note and vitals reviewed.    ASSESSMENT/PLAN:  Problem List Items Addressed This Visit        Respiratory    Moderate persistent asthma with acute exacerbation - Primary     Moderate persistent asthma. Cough since covid. Albuterol helpful for cough. Had sinusitis at last visit. On Symbicort 160/4.5 mcg 2 puff inhaled twice daily. has struggled with recurrent thrush. Spiriva not covered. Flares with URI, exercise, allergies, cats, dogs, cold air, mowing grass, raking leaves.  Previously on Advair.  Symbicort resulted in better control.       -Symbicort to 160/4.5mcg 2 puff bid.   -Singulair 10mg by mouth daily at night.   -Albuterol 2-4 puffs inhaled (use a spacer unless using a Proair Respiclick device) every 4 hours as needed for chest tightness, wheezing, shortness of breath and/or coughing.   -Total IgE elevated.   -absolute eosinophil count of 300  -Allergy testing positive for aspergillus, cat, dog, molds, grass, ragweed.   -zpak for 5 days given persistent cough. Anti-inflammatory.   -if cough persist would get chest x-ray         Relevant Medications    azithromycin (ZITHROMAX) 250 MG tablet    Other Relevant Orders    Pulmonary Function Test    Seasonal allergic rhinitis due to pollen     History of allergic rhinoconjunctivitis.  Has been on allergen immunotherapy in the past.  Not recently.  Stopped immunotherapy secondary to systemic reactions.  Claritin-D is used as needed for nasal symptoms.  Significantly beneficial.     Allergy testing positive for aspergillus, cat, dog, molds, grass, ragweed.      -Claritin-D as needed.   -Singulair 10mg by mouth daily at night.          Allergic  rhinitis due to mold    Allergic rhinitis due to animal dander       Other    Food allergy     Symptoms with avocado, banana, latex.    Positive serum igE for avocado, and latex.    - Anaphylaxis action plan was provided.  - Prescription for epinephrine was provided.  -She is on beta-blocker.  Discussed with patient that beta-blocker could interfere with epinephrine.  Prescription for glucagon was provided.  Instructed when and how to use.         Latex allergy          Chart documentation with Dragon Voice recognition Software. Although reviewed after completion, some words and grammatical errors may remain.    Abundio Joaquin DO FAAAAI  Medical Director for Allergy/Immunology at [place], [place]

## 2021-04-07 NOTE — ASSESSMENT & PLAN NOTE
Symptoms with avocado, banana, latex.    Positive serum igE for avocado, and latex.    - Anaphylaxis action plan was provided.  - Prescription for epinephrine was provided.  -She is on beta-blocker.  Discussed with patient that beta-blocker could interfere with epinephrine.  Prescription for glucagon was provided.  Instructed when and how to use.

## 2021-04-08 ASSESSMENT — ASTHMA QUESTIONNAIRES: ACT_TOTALSCORE: 22

## 2021-04-09 DIAGNOSIS — J45.40 MODERATE PERSISTENT ASTHMA WITHOUT COMPLICATION: ICD-10-CM

## 2021-04-09 RX ORDER — BUDESONIDE AND FORMOTEROL FUMARATE DIHYDRATE 160; 4.5 UG/1; UG/1
2 AEROSOL RESPIRATORY (INHALATION) 2 TIMES DAILY
Qty: 10.2 G | Refills: 1 | Status: CANCELLED | OUTPATIENT
Start: 2021-04-09

## 2021-04-30 ENCOUNTER — MYC MEDICAL ADVICE (OUTPATIENT)
Dept: ALLERGY | Facility: OTHER | Age: 66
End: 2021-04-30

## 2021-04-30 DIAGNOSIS — B37.0 THRUSH: Primary | ICD-10-CM

## 2021-04-30 NOTE — TELEPHONE ENCOUNTER
Has been the patient ever tried clotrimazole?  If not, I will send a prescription.  Please let me know.      Arturo Patel MD

## 2021-04-30 NOTE — TELEPHONE ENCOUNTER
Patient would like to try this medication as long as it is ok to take with her other medications. Forwarding to Dr. Patel to send prescription. Brigid Javed RN, BSN [facility]

## 2021-04-30 NOTE — TELEPHONE ENCOUNTER
Please review and advise if able to initiate chronic therapy for thrush. Brigid Javed RN, BSN [facility]

## 2021-05-01 RX ORDER — CLOTRIMAZOLE 10 MG/1
10 LOZENGE ORAL
Qty: 50 LOZENGE | Refills: 0 | Status: SHIPPED | OUTPATIENT
Start: 2021-05-01 | End: 2021-05-11

## 2021-05-03 ENCOUNTER — MYC MEDICAL ADVICE (OUTPATIENT)
Dept: ALLERGY | Facility: OTHER | Age: 66
End: 2021-05-03

## 2021-06-08 RX ORDER — METOPROLOL TARTRATE 25 MG/1
TABLET, FILM COATED ORAL
OUTPATIENT
Start: 2021-06-08

## 2021-06-08 NOTE — TELEPHONE ENCOUNTER
Refused Prescriptions:                       Disp   Refills    metoprolol tartrate (LOPRESSOR) 25 MG tabl*                Refused By: SHAQUILLE CONDON  Reason for Refusal: Originating/Specialty Provider to approve  Reason for Refusal Comment: This request should go to pt's PCP, not allergist.    Shaquille Condon, RN

## 2021-06-10 ENCOUNTER — MYC MEDICAL ADVICE (OUTPATIENT)
Dept: ALLERGY | Facility: OTHER | Age: 66
End: 2021-06-10

## 2021-06-10 DIAGNOSIS — J45.41 MODERATE PERSISTENT ASTHMA WITH ACUTE EXACERBATION: Primary | ICD-10-CM

## 2021-06-10 RX ORDER — PREDNISONE 20 MG/1
20 TABLET ORAL 2 TIMES DAILY
Qty: 14 TABLET | Refills: 0 | Status: SHIPPED | OUTPATIENT
Start: 2021-06-10 | End: 2021-06-17

## 2021-06-10 NOTE — TELEPHONE ENCOUNTER
Patient is scheduled for asthma follow up tomorrow (6/11). She has been in the yellow zone since Tuesday 6/8/12. Advised to follow recommendations on asthma action plan. Forwarding to MD to advise if any further guidance while patient is waiting to be seen. Rescue medication is only helping minimally. Brigid Javed RN, BSN [facility]

## 2021-06-10 NOTE — TELEPHONE ENCOUNTER
Having wheezing, congestion, coughing, and shortness of breath. Using albuterol 4 times daily since Tuesday. On Symbicort 2 puffs twice daily. Using Singulair. Will start Prednisone 20mg by mouth twice daily for 5 days. Prior to this asthma had been controlled. Will follow up if symptoms remain.

## 2021-06-23 ENCOUNTER — MYC REFILL (OUTPATIENT)
Dept: OBGYN | Facility: CLINIC | Age: 66
End: 2021-06-23

## 2021-06-23 DIAGNOSIS — N95.1 SYMPTOMATIC MENOPAUSAL OR FEMALE CLIMACTERIC STATES: ICD-10-CM

## 2021-06-23 RX ORDER — ESTRADIOL 0.03 MG/D
1 FILM, EXTENDED RELEASE TRANSDERMAL
Qty: 24 PATCH | Refills: 0 | Status: CANCELLED | OUTPATIENT
Start: 2021-06-24

## 2021-06-23 RX ORDER — ESTRADIOL 0.03 MG/D
1 FILM, EXTENDED RELEASE TRANSDERMAL
Qty: 24 PATCH | Refills: 2 | Status: SHIPPED | OUTPATIENT
Start: 2021-06-24 | End: 2022-02-23

## 2021-06-23 NOTE — TELEPHONE ENCOUNTER
"Requested Prescriptions   Pending Prescriptions Disp Refills     estradiol (VIVELLE-DOT) 0.025 MG/24HR bi-weekly patch 24 patch 0     Sig: Place 1 patch onto the skin twice a week Appointment needed for additional refills.       Hormone Replacement Therapy Passed - 6/23/2021  7:13 AM        Passed - Blood pressure under 140/90 in past 12 months     BP Readings from Last 3 Encounters:   04/07/21 102/64   02/16/21 130/70   03/11/20 106/62                 Passed - Recent (12 mo) or future (30 days) visit within the authorizing provider's specialty     Patient has had an office visit with the authorizing provider or a provider within the authorizing providers department within the previous 12 mos or has a future within next 30 days. See \"Patient Info\" tab in inbasket, or \"Choose Columns\" in Meds & Orders section of the refill encounter.              Passed - Patient has mammogram in past 2 years on file if age 50-75        Passed - Medication is active on med list        Passed - Patient is 18 years of age or older        Passed - No active pregnancy on record        Passed - No positive pregnancy test on record in past 12 months           Last Written Prescription Date:  2/16/21  Last Fill Quantity: 24,  # refills: 0   Last office visit: 2/16/2021 with prescribing provider:  LAINA Mcclelland to continue HRT for 1 year.    Future Office Visit:   Next 5 appointments (look out 90 days)    Jul 07, 2021  1:00 PM  Return Visit with Abundio Joaquin DO  [facility] ([facility] ) [address] [address]  [phone]         Prescription approved per [facility] Refill Protocol.  Kerry Dewitt RN on 6/23/2021 at 8:52 AM          "

## 2021-07-04 DIAGNOSIS — R60.9 EDEMA, UNSPECIFIED TYPE: ICD-10-CM

## 2021-07-05 RX ORDER — TRIAMTERENE AND HYDROCHLOROTHIAZIDE 37.5; 25 MG/1; MG/1
CAPSULE ORAL
Qty: 180 CAPSULE | Refills: 0 | Status: SHIPPED | OUTPATIENT
Start: 2021-07-05 | End: 2021-10-11

## 2021-07-05 NOTE — TELEPHONE ENCOUNTER
"Requested Prescriptions   Pending Prescriptions Disp Refills     triamterene-HCTZ (DYAZIDE) 37.5-25 MG capsule [Pharmacy Med Name: TRIAMTERENE 37.5MG/ HCTZ 25MG CAPS] 180 capsule 0     Sig: TAKE 2 CAPSULES BY MOUTH AS NEEDED FOR FLUID RETENTION       Diuretics (Including Combos) Protocol Failed - 7/4/2021  5:15 AM        Failed - Normal serum creatinine on file in past 12 months     Recent Labs   Lab Test 05/30/14   CR 0.74              Failed - Normal serum potassium on file in past 12 months     Recent Labs   Lab Test 05/30/14   POTASSIUM 3.6                    Failed - Normal serum sodium on file in past 12 months     No lab results found.           Passed - Blood pressure under 140/90 in past 12 months     BP Readings from Last 3 Encounters:   04/07/21 102/64   02/16/21 130/70   03/11/20 106/62                 Passed - Recent (12 mo) or future (30 days) visit within the authorizing provider's specialty     Patient has had an office visit with the authorizing provider or a provider within the authorizing providers department within the previous 12 mos or has a future within next 30 days. See \"Patient Info\" tab in inbasket, or \"Choose Columns\" in Meds & Orders section of the refill encounter.              Passed - Medication is active on med list        Passed - Patient is age 18 or older        Passed - No active pregancy on record        Passed - No positive pregnancy test in past 12 months           Last Written Prescription Date:  2/16/21  Last Fill Quantity: 180,  # refills: 0   Last office visit: 2/16/2021 with prescribing provider:  Toma Simmons   Future Office Visit:   Next 5 appointments (look out 90 days)    Jul 07, 2021  1:00 PM  Return Visit with Abundio Joaquin MD  [facility] ([facility] ) [address] [address] [phone]                 "

## 2021-07-05 NOTE — TELEPHONE ENCOUNTER
Prescription approved per [facility] Refill Protocol.  Melva Loya RN on 7/5/2021 at 9:55 AM

## 2021-07-07 ENCOUNTER — OFFICE VISIT (OUTPATIENT)
Dept: ALLERGY | Facility: OTHER | Age: 66
End: 2021-07-07
Payer: COMMERCIAL

## 2021-07-07 VITALS
HEIGHT: 67 IN | WEIGHT: 176 LBS | BODY MASS INDEX: 27.62 KG/M2 | HEART RATE: 71 BPM | OXYGEN SATURATION: 98 % | DIASTOLIC BLOOD PRESSURE: 65 MMHG | SYSTOLIC BLOOD PRESSURE: 103 MMHG

## 2021-07-07 DIAGNOSIS — J30.89 ALLERGIC RHINITIS DUE TO MOLD: ICD-10-CM

## 2021-07-07 DIAGNOSIS — J30.1 SEASONAL ALLERGIC RHINITIS DUE TO POLLEN: ICD-10-CM

## 2021-07-07 DIAGNOSIS — Z91.018 FOOD ALLERGY: ICD-10-CM

## 2021-07-07 DIAGNOSIS — J45.40 MODERATE PERSISTENT ASTHMA WITHOUT COMPLICATION: ICD-10-CM

## 2021-07-07 DIAGNOSIS — J30.81 ALLERGIC RHINITIS DUE TO ANIMAL DANDER: Primary | ICD-10-CM

## 2021-07-07 PROCEDURE — 99214 OFFICE O/P EST MOD 30 MIN: CPT | Performed by: ALLERGY & IMMUNOLOGY

## 2021-07-07 RX ORDER — ALBUTEROL SULFATE 90 UG/1
2 AEROSOL, METERED RESPIRATORY (INHALATION) EVERY 4 HOURS PRN
Qty: 18 G | Refills: 3 | Status: SHIPPED | OUTPATIENT
Start: 2021-07-07 | End: 2022-04-12

## 2021-07-07 RX ORDER — PREDNISONE 20 MG/1
20 TABLET ORAL 2 TIMES DAILY
Qty: 10 TABLET | Refills: 0 | Status: SHIPPED | OUTPATIENT
Start: 2021-07-07 | End: 2021-07-12

## 2021-07-07 RX ORDER — MONTELUKAST SODIUM 10 MG/1
10 TABLET ORAL AT BEDTIME
Qty: 30 TABLET | Refills: 11 | Status: SHIPPED | OUTPATIENT
Start: 2021-07-07 | End: 2022-04-12

## 2021-07-07 RX ORDER — BUDESONIDE AND FORMOTEROL FUMARATE DIHYDRATE 160; 4.5 UG/1; UG/1
2 AEROSOL RESPIRATORY (INHALATION) 2 TIMES DAILY
Qty: 10.2 G | Refills: 1 | Status: SHIPPED | OUTPATIENT
Start: 2021-07-07 | End: 2021-09-13

## 2021-07-07 ASSESSMENT — MIFFLIN-ST. JEOR: SCORE: 1375.96

## 2021-07-07 NOTE — LETTER
7/7/2021         RE: Heather Wilkinson  [address] [facility] [address]        Dear Colleague,    Thank you for referring your patient, Heather Wilkinson, to the [facility]. Please see a copy of my visit note below.    Heather Wilkinson is a 65 year old White female with previous medical history significant for asthma, latex allergy and allergic rhinitis who returns for a follow up visit.     Patient had flaring of her asthma in mid June.  Treated with prednisone.  This was beneficial.  Now her asthma is controlled.  She is on Symbicort 160/4.5 mcg 2 puffs inhaled twice daily.  She reports that her asthma flares requiring prednisone 2-3 times per year.  Prednisone when used is beneficial.  She additionally is on Singulair 10 mg by mouth daily.  Allergy testing in the past positive for mold, cat, dog, grass and weeds.  Nasal symptoms have been controlled.  On Flonase and oral antihistamine as well.       ACT Total Scores 7/7/2021   ACT TOTAL SCORE (Goal Greater than or Equal to 20) 23   In the past 12 months, how many times did you visit the emergency room for your asthma without being admitted to the hospital? 0   In the past 12 months, how many times were you hospitalized overnight because of your asthma? 0                Past Medical History:   Diagnosis Date     Allergic rhinitis      Asthma      Atrial fibrillation (H)      Cough      Fluid retention      Tachycardia      Family History   Problem Relation Age of Onset     Glaucoma No family hx of      Macular Degeneration No family hx of      Past Surgical History:   Procedure Laterality Date     CYSTOSCOPY  5/11/2012    Procedure:CYSTOSCOPY; Surgeon:RADHA COMER; Location:[facility]     GYN SURGERY      csection, infertility, hyst     HYSTERECTOMY  1992     HYSTERECTOMY, PAP NO LONGER INDICATED       SLING TRANSVAGINAL  5/11/2012    Procedure:SLING TRANSVAGINAL; mini arc sling  and cystoscopy; Surgeon:RADHA COMER  MARLO; Location:[facility]       REVIEW OF SYSTEMS:  Nose: negative for snoring.negative for changes in smell. negative for drainage.   Eyes: negative for eye watering. negative for eye itching. negative for vision changes. negative for eye redness.  Throat: negative for hoarseness. negative for sore throat. negative for trouble swallowing.   Lungs: negative for shortness of breath.positive  for wheezing. negative for sputum production.   Integumentary: negative for rash. negative for scaling. negative for nail changes.       Current Outpatient Medications:      acyclovir (ZOVIRAX) 400 MG tablet, Take 1 tablet (400 mg) by mouth 2 times daily Please keep appt 2/19/21, Disp: 60 tablet, Rfl: 11     albuterol (ACCUNEB) 1.25 MG/3ML neb solution, Take 2 vials (2.5 mg) by nebulization every 4 hours as needed for shortness of breath / dyspnea or wheezing, Disp: 30 vial, Rfl: 4     albuterol (PROAIR HFA/PROVENTIL HFA/VENTOLIN HFA) 108 (90 Base) MCG/ACT inhaler, Inhale 2 puffs into the lungs every 4 hours as needed for shortness of breath / dyspnea or wheezing, Disp: 18 g, Rfl: 3     budesonide-formoterol (SYMBICORT) 160-4.5 MCG/ACT Inhaler, Inhale 2 puffs into the lungs 2 times daily, Disp: 10.2 g, Rfl: 1     Cholecalciferol (VITAMIN D-3 PO), Take 1 tablet by mouth, Disp: , Rfl:      CLARITIN-D 24 HOUR  MG per 24 hr tablet, TK 1 T PO  D, Disp: , Rfl: 0     dexamethasone (DECADRON) 0.1 % ophthalmic solution, Place 1 drop into the right eye every other day, Disp: 5 mL, Rfl: 6     EPINEPHrine (AUVI-Q) 0.3 MG/0.3ML injection 2-pack, Inject 0.3 mLs (0.3 mg) into the muscle as needed for anaphylaxis, Disp: 2 each, Rfl: 1     erythromycin (ROMYCIN) 5 MG/GM ophthalmic ointment, 1 application to each eye at bedtime, Disp: 3.5 g, Rfl: 11     estradiol (VIVELLE-DOT) 0.025 MG/24HR bi-weekly patch, Place 1 patch onto the skin twice a week, Disp: 24 patch, Rfl: 2     fluticasone (FLONASE) 50 MCG/ACT nasal spray, Spray 2 sprays into both  nostrils daily, Disp: 16 g, Rfl: 4     glucagon 1 MG kit, Inject 1mg as needed for anaphylaxis that is non-responding to epinephrine., Disp: 1 mg, Rfl: 0     Hypromellose (ARTIFICIAL TEARS OP), Apply 1 drop to eye as needed Usually every 30 minutes., Disp: , Rfl:      metoprolol tartrate (LOPRESSOR) 25 MG tablet, , Disp: , Rfl:      montelukast (SINGULAIR) 10 MG tablet, Take 1 tablet (10 mg) by mouth At Bedtime, Disp: 30 tablet, Rfl: 11     order for DME, Equipment being ordered: Nebulizer, Disp: 1 Box, Rfl: 0     phenazopyridine (PYRIDIUM) 100 MG tablet, Take 2 tablets (200 mg) by mouth 3 times daily as needed, Disp: 20 tablet, Rfl: 1     predniSONE (DELTASONE) 20 MG tablet, Take 1 tablet (20 mg) by mouth 2 times daily for 5 days, Disp: 10 tablet, Rfl: 0     tiotropium (SPIRIVA RESPIMAT) 2.5 MCG/ACT inhaler, Inhale 2 puffs into the lungs daily, Disp: 4 g, Rfl: 3     triamterene-HCTZ (DYAZIDE) 37.5-25 MG capsule, TAKE 2 CAPSULES BY MOUTH AS NEEDED FOR FLUID RETENTION, Disp: 180 capsule, Rfl: 0  Immunization History   Administered Date(s) Administered     COVID-19,PF,Pfizer 03/05/2021, 03/26/2021     DTaP, Unspecified 09/05/2014     Pneumococcal 23 valent 10/16/2013     TD (ADULT, 7+) 04/01/2003     Allergies   Allergen Reactions     Latex Anaphylaxis     Xray Dye  [Contrast Dye] Hives     Pt states she had allergic reaction to xray day several years ago (hives) Pt has been successfully premedicated for contrast allergy without problem.      Sulfa Drugs      Penicillins Rash     Other reaction(s): Swelling, lips/tongue  Childhood rash         EXAM:   Constitutional:  Appears well-developed and well-nourished. No distress.   HEENT:   Head: Normocephalic.   Nasal tissue pink and normal appearing.  No rhinorrhea noted.    Eyes: Conjunctivae are non-erythematous   Cardiovascular: Normal rate, regular rhythm and normal heart sounds. Exam reveals no gallop and no friction rub.   No murmur heard.  Respiratory: Effort normal  and breath sounds normal. No respiratory distress. No wheezes. No rales.   Musculoskeletal: Normal range of motion.   Neuro: Oriented to person, place, and time.  Skin: Skin is warm and dry. No rash noted.   Psychiatric: Normal mood and affect.     Nursing note and vitals reviewed.    ASSESSMENT/PLAN:  Problem List Items Addressed This Visit        Respiratory    Moderate persistent asthma without complication     Moderate persistent asthma. Cough since covid. Albuterol helpful for cough.On Symbicort 160/4.5 mcg 2 puff inhaled twice daily. has struggled with recurrent thrush. Spiriva not covered in past but now new insurance. Chest symptoms flared in June. Treated with prednisone. Flares with URI, exercise, allergies, cats, dogs, cold air, mowing grass, raking leaves. Historically prednisone 2-3 times yearly.      -Symbicort to 160/4.5mcg 2 puff bid.   -Singulair 10mg by mouth daily at night.   -Albuterol 2-4 puffs inhaled (use a spacer unless using a Proair Respiclick device) every 4 hours as needed for chest tightness, wheezing, shortness of breath and/or coughing.   -Start Spiriva 2.5 mcg 2 puff inhaled daily.  -Total IgE elevated.   -absolute eosinophil count of 300  -Allergy testing positive for aspergillus, cat, dog,  grass, ragweed.            Relevant Medications    albuterol (PROAIR HFA/PROVENTIL HFA/VENTOLIN HFA) 108 (90 Base) MCG/ACT inhaler    budesonide-formoterol (SYMBICORT) 160-4.5 MCG/ACT Inhaler    montelukast (SINGULAIR) 10 MG tablet    tiotropium (SPIRIVA RESPIMAT) 2.5 MCG/ACT inhaler    predniSONE (DELTASONE) 20 MG tablet    Seasonal allergic rhinitis due to pollen     History of allergic rhinoconjunctivitis.  Has been on allergen immunotherapy in the past.  Not recently.  Stopped immunotherapy secondary to systemic reactions.  Claritin-D is used as needed for nasal symptoms.  Significantly beneficial.     Allergy testing positive for aspergillus, cat, dog, molds, grass, ragweed.      -Claritin-D  as needed.   -Singulair 10mg by mouth daily at night.   -Flonase         Relevant Medications    albuterol (PROAIR HFA/PROVENTIL HFA/VENTOLIN HFA) 108 (90 Base) MCG/ACT inhaler    budesonide-formoterol (SYMBICORT) 160-4.5 MCG/ACT Inhaler    montelukast (SINGULAIR) 10 MG tablet    tiotropium (SPIRIVA RESPIMAT) 2.5 MCG/ACT inhaler    Allergic rhinitis due to mold    Relevant Medications    albuterol (PROAIR HFA/PROVENTIL HFA/VENTOLIN HFA) 108 (90 Base) MCG/ACT inhaler    budesonide-formoterol (SYMBICORT) 160-4.5 MCG/ACT Inhaler    montelukast (SINGULAIR) 10 MG tablet    tiotropium (SPIRIVA RESPIMAT) 2.5 MCG/ACT inhaler    Allergic rhinitis due to animal dander - Primary    Relevant Medications    albuterol (PROAIR HFA/PROVENTIL HFA/VENTOLIN HFA) 108 (90 Base) MCG/ACT inhaler    budesonide-formoterol (SYMBICORT) 160-4.5 MCG/ACT Inhaler    montelukast (SINGULAIR) 10 MG tablet    tiotropium (SPIRIVA RESPIMAT) 2.5 MCG/ACT inhaler       Other    Food allergy     Symptoms with avocado, banana, latex.     Positive serum igE for avocado, and latex.     - Anaphylaxis action plan was provided.  - Prescription for epinephrine was provided.  -She is on beta-blocker.  Discussed with patient that beta-blocker could interfere with epinephrine.  Prescription for glucagon was provided.  Instructed when and how to use.         Relevant Medications    albuterol (PROAIR HFA/PROVENTIL HFA/VENTOLIN HFA) 108 (90 Base) MCG/ACT inhaler    budesonide-formoterol (SYMBICORT) 160-4.5 MCG/ACT Inhaler    montelukast (SINGULAIR) 10 MG tablet    tiotropium (SPIRIVA RESPIMAT) 2.5 MCG/ACT inhaler          Chart documentation with Dragon Voice recognition Software. Although reviewed after completion, some words and grammatical errors may remain.    Abundio Joaquin DO FAAAAI  Medical Director for Allergy/Immunology at [place], [place]        Again, thank you for allowing me to participate in the care of your  patient.        Sincerely,        Abundio Joaquin MD

## 2021-07-07 NOTE — ASSESSMENT & PLAN NOTE
Moderate persistent asthma. Cough since covid. Albuterol helpful for cough.On Symbicort 160/4.5 mcg 2 puff inhaled twice daily. has struggled with recurrent thrush. Spiriva not covered in past but now new insurance. Chest symptoms flared in June. Treated with prednisone. Flares with URI, exercise, allergies, cats, dogs, cold air, mowing grass, raking leaves. Historically prednisone 2-3 times yearly.      -Symbicort to 160/4.5mcg 2 puff bid.   -Singulair 10mg by mouth daily at night.   -Albuterol 2-4 puffs inhaled (use a spacer unless using a Proair Respiclick device) every 4 hours as needed for chest tightness, wheezing, shortness of breath and/or coughing.   -Start Spiriva 2.5 mcg 2 puff inhaled daily.  -Total IgE elevated.   -absolute eosinophil count of 300  -Allergy testing positive for aspergillus, cat, dog,  grass, ragweed.

## 2021-07-07 NOTE — PROGRESS NOTES
Heather Wilkinson is a 65 year old White female with previous medical history significant for asthma, latex allergy and allergic rhinitis who returns for a follow up visit.     Patient had flaring of her asthma in mid June.  Treated with prednisone.  This was beneficial.  Now her asthma is controlled.  She is on Symbicort 160/4.5 mcg 2 puffs inhaled twice daily.  She reports that her asthma flares requiring prednisone 2-3 times per year.  Prednisone when used is beneficial.  She additionally is on Singulair 10 mg by mouth daily.  Allergy testing in the past positive for mold, cat, dog, grass and weeds.  Nasal symptoms have been controlled.  On Flonase and oral antihistamine as well.       ACT Total Scores 7/7/2021   ACT TOTAL SCORE (Goal Greater than or Equal to 20) 23   In the past 12 months, how many times did you visit the emergency room for your asthma without being admitted to the hospital? 0   In the past 12 months, how many times were you hospitalized overnight because of your asthma? 0                Past Medical History:   Diagnosis Date     Allergic rhinitis      Asthma      Atrial fibrillation (H)      Cough      Fluid retention      Tachycardia      Family History   Problem Relation Age of Onset     Glaucoma No family hx of      Macular Degeneration No family hx of      Past Surgical History:   Procedure Laterality Date     CYSTOSCOPY  5/11/2012    Procedure:CYSTOSCOPY; Surgeon:RADHA COMER; Location:[facility]     GYN SURGERY      csection, infertility, hyst     HYSTERECTOMY  1992     HYSTERECTOMY, PAP NO LONGER INDICATED       SLING TRANSVAGINAL  5/11/2012    Procedure:SLING TRANSVAGINAL; mini arc sling  and cystoscopy; Surgeon:RADHA COMER; Location:[facility]       REVIEW OF SYSTEMS:  Nose: negative for snoring.negative for changes in smell. negative for drainage.   Eyes: negative for eye watering. negative for eye itching. negative for vision changes. negative for eye redness.  Throat:  negative for hoarseness. negative for sore throat. negative for trouble swallowing.   Lungs: negative for shortness of breath.positive  for wheezing. negative for sputum production.   Integumentary: negative for rash. negative for scaling. negative for nail changes.       Current Outpatient Medications:      acyclovir (ZOVIRAX) 400 MG tablet, Take 1 tablet (400 mg) by mouth 2 times daily Please keep appt 2/19/21, Disp: 60 tablet, Rfl: 11     albuterol (ACCUNEB) 1.25 MG/3ML neb solution, Take 2 vials (2.5 mg) by nebulization every 4 hours as needed for shortness of breath / dyspnea or wheezing, Disp: 30 vial, Rfl: 4     albuterol (PROAIR HFA/PROVENTIL HFA/VENTOLIN HFA) 108 (90 Base) MCG/ACT inhaler, Inhale 2 puffs into the lungs every 4 hours as needed for shortness of breath / dyspnea or wheezing, Disp: 18 g, Rfl: 3     budesonide-formoterol (SYMBICORT) 160-4.5 MCG/ACT Inhaler, Inhale 2 puffs into the lungs 2 times daily, Disp: 10.2 g, Rfl: 1     Cholecalciferol (VITAMIN D-3 PO), Take 1 tablet by mouth, Disp: , Rfl:      CLARITIN-D 24 HOUR  MG per 24 hr tablet, TK 1 T PO  D, Disp: , Rfl: 0     dexamethasone (DECADRON) 0.1 % ophthalmic solution, Place 1 drop into the right eye every other day, Disp: 5 mL, Rfl: 6     EPINEPHrine (AUVI-Q) 0.3 MG/0.3ML injection 2-pack, Inject 0.3 mLs (0.3 mg) into the muscle as needed for anaphylaxis, Disp: 2 each, Rfl: 1     erythromycin (ROMYCIN) 5 MG/GM ophthalmic ointment, 1 application to each eye at bedtime, Disp: 3.5 g, Rfl: 11     estradiol (VIVELLE-DOT) 0.025 MG/24HR bi-weekly patch, Place 1 patch onto the skin twice a week, Disp: 24 patch, Rfl: 2     fluticasone (FLONASE) 50 MCG/ACT nasal spray, Spray 2 sprays into both nostrils daily, Disp: 16 g, Rfl: 4     glucagon 1 MG kit, Inject 1mg as needed for anaphylaxis that is non-responding to epinephrine., Disp: 1 mg, Rfl: 0     Hypromellose (ARTIFICIAL TEARS OP), Apply 1 drop to eye as needed Usually every 30 minutes.,  Disp: , Rfl:      metoprolol tartrate (LOPRESSOR) 25 MG tablet, , Disp: , Rfl:      montelukast (SINGULAIR) 10 MG tablet, Take 1 tablet (10 mg) by mouth At Bedtime, Disp: 30 tablet, Rfl: 11     order for DME, Equipment being ordered: Nebulizer, Disp: 1 Box, Rfl: 0     phenazopyridine (PYRIDIUM) 100 MG tablet, Take 2 tablets (200 mg) by mouth 3 times daily as needed, Disp: 20 tablet, Rfl: 1     predniSONE (DELTASONE) 20 MG tablet, Take 1 tablet (20 mg) by mouth 2 times daily for 5 days, Disp: 10 tablet, Rfl: 0     tiotropium (SPIRIVA RESPIMAT) 2.5 MCG/ACT inhaler, Inhale 2 puffs into the lungs daily, Disp: 4 g, Rfl: 3     triamterene-HCTZ (DYAZIDE) 37.5-25 MG capsule, TAKE 2 CAPSULES BY MOUTH AS NEEDED FOR FLUID RETENTION, Disp: 180 capsule, Rfl: 0  Immunization History   Administered Date(s) Administered     COVID-19,PF,Pfizer 03/05/2021, 03/26/2021     DTaP, Unspecified 09/05/2014     Pneumococcal 23 valent 10/16/2013     TD (ADULT, 7+) 04/01/2003     Allergies   Allergen Reactions     Latex Anaphylaxis     Xray Dye  [Contrast Dye] Hives     Pt states she had allergic reaction to xray day several years ago (hives) Pt has been successfully premedicated for contrast allergy without problem.      Sulfa Drugs      Penicillins Rash     Other reaction(s): Swelling, lips/tongue  Childhood rash         EXAM:   Constitutional:  Appears well-developed and well-nourished. No distress.   HEENT:   Head: Normocephalic.   Nasal tissue pink and normal appearing.  No rhinorrhea noted.    Eyes: Conjunctivae are non-erythematous   Cardiovascular: Normal rate, regular rhythm and normal heart sounds. Exam reveals no gallop and no friction rub.   No murmur heard.  Respiratory: Effort normal and breath sounds normal. No respiratory distress. No wheezes. No rales.   Musculoskeletal: Normal range of motion.   Neuro: Oriented to person, place, and time.  Skin: Skin is warm and dry. No rash noted.   Psychiatric: Normal mood and affect.      Nursing note and vitals reviewed.    ASSESSMENT/PLAN:  Problem List Items Addressed This Visit        Respiratory    Moderate persistent asthma without complication     Moderate persistent asthma. Cough since covid. Albuterol helpful for cough.On Symbicort 160/4.5 mcg 2 puff inhaled twice daily. has struggled with recurrent thrush. Spiriva not covered in past but now new insurance. Chest symptoms flared in June. Treated with prednisone. Flares with URI, exercise, allergies, cats, dogs, cold air, mowing grass, raking leaves. Historically prednisone 2-3 times yearly.      -Symbicort to 160/4.5mcg 2 puff bid.   -Singulair 10mg by mouth daily at night.   -Albuterol 2-4 puffs inhaled (use a spacer unless using a Proair Respiclick device) every 4 hours as needed for chest tightness, wheezing, shortness of breath and/or coughing.   -Start Spiriva 2.5 mcg 2 puff inhaled daily.  -Total IgE elevated.   -absolute eosinophil count of 300  -Allergy testing positive for aspergillus, cat, dog,  grass, ragweed.            Relevant Medications    albuterol (PROAIR HFA/PROVENTIL HFA/VENTOLIN HFA) 108 (90 Base) MCG/ACT inhaler    budesonide-formoterol (SYMBICORT) 160-4.5 MCG/ACT Inhaler    montelukast (SINGULAIR) 10 MG tablet    tiotropium (SPIRIVA RESPIMAT) 2.5 MCG/ACT inhaler    predniSONE (DELTASONE) 20 MG tablet    Seasonal allergic rhinitis due to pollen     History of allergic rhinoconjunctivitis.  Has been on allergen immunotherapy in the past.  Not recently.  Stopped immunotherapy secondary to systemic reactions.  Claritin-D is used as needed for nasal symptoms.  Significantly beneficial.     Allergy testing positive for aspergillus, cat, dog, molds, grass, ragweed.      -Claritin-D as needed.   -Singulair 10mg by mouth daily at night.   -Flonase         Relevant Medications    albuterol (PROAIR HFA/PROVENTIL HFA/VENTOLIN HFA) 108 (90 Base) MCG/ACT inhaler    budesonide-formoterol (SYMBICORT) 160-4.5 MCG/ACT Inhaler     montelukast (SINGULAIR) 10 MG tablet    tiotropium (SPIRIVA RESPIMAT) 2.5 MCG/ACT inhaler    Allergic rhinitis due to mold    Relevant Medications    albuterol (PROAIR HFA/PROVENTIL HFA/VENTOLIN HFA) 108 (90 Base) MCG/ACT inhaler    budesonide-formoterol (SYMBICORT) 160-4.5 MCG/ACT Inhaler    montelukast (SINGULAIR) 10 MG tablet    tiotropium (SPIRIVA RESPIMAT) 2.5 MCG/ACT inhaler    Allergic rhinitis due to animal dander - Primary    Relevant Medications    albuterol (PROAIR HFA/PROVENTIL HFA/VENTOLIN HFA) 108 (90 Base) MCG/ACT inhaler    budesonide-formoterol (SYMBICORT) 160-4.5 MCG/ACT Inhaler    montelukast (SINGULAIR) 10 MG tablet    tiotropium (SPIRIVA RESPIMAT) 2.5 MCG/ACT inhaler       Other    Food allergy     Symptoms with avocado, banana, latex.     Positive serum igE for avocado, and latex.     - Anaphylaxis action plan was provided.  - Prescription for epinephrine was provided.  -She is on beta-blocker.  Discussed with patient that beta-blocker could interfere with epinephrine.  Prescription for glucagon was provided.  Instructed when and how to use.         Relevant Medications    albuterol (PROAIR HFA/PROVENTIL HFA/VENTOLIN HFA) 108 (90 Base) MCG/ACT inhaler    budesonide-formoterol (SYMBICORT) 160-4.5 MCG/ACT Inhaler    montelukast (SINGULAIR) 10 MG tablet    tiotropium (SPIRIVA RESPIMAT) 2.5 MCG/ACT inhaler          Chart documentation with Dragon Voice recognition Software. Although reviewed after completion, some words and grammatical errors may remain.    Abundio Joaquin DO FAAAAI  Medical Director for Allergy/Immunology at [facility] and [place], [place]

## 2021-07-07 NOTE — ASSESSMENT & PLAN NOTE
History of allergic rhinoconjunctivitis.  Has been on allergen immunotherapy in the past.  Not recently.  Stopped immunotherapy secondary to systemic reactions.  Claritin-D is used as needed for nasal symptoms.  Significantly beneficial.     Allergy testing positive for aspergillus, cat, dog, molds, grass, ragweed.      -Claritin-D as needed.   -Singulair 10mg by mouth daily at night.   -Flonase

## 2021-07-07 NOTE — PATIENT INSTRUCTIONS
Allergy Staff Appt Hours Shot Hours Locations    Physician     Abundio Joaquin DO       Support Staff     MAURY Crandall CMA  Tuesday:   [place] 7-5 Wednesday:  [place]: 7-5 Thursday:                    [place] 7-6     Friday:  [place]  7-2   [place]        Thursday: 7-5:20        Friday: 7-12:20     [place]        Tuesday: 7- 3:20 Wednesday: 7-4:20 [place] Monday: 7-2:20 Tuesday: 9-5:20         [facility]  [address] [place] [address]  Appt Line: [phone]      [facility]  [address] [place] [address]  Appt Line: [phone]         Important Scheduling Information  Aspirin Desensitization: Appt will last 2 clinic days. Please call the Allergy RN line for your clinic to schedule. Discontinue antihistamines 7 days prior to the appointment.     Food Challenges: Appt will last 3-4 hours. Please call the Allergy RN line for your clinic to schedule. Discontinue antihistamines 7 days prior to the appointment.     Penicillin Testing: Appt will last 2-3 hours. Please call the Allergy RN line for your clinic to schedule. Discontinue antihistamines 7 days prior to the appointment.     Skin Testing: Appt will about 40 minutes. Call the appointment line for your clinic to schedule. Discontinue antihistamines 7 days prior to the appointment.     Venom Testing: Appt will last 2-3 hours. Please call the Allergy RN line for your clinic to schedule. Discontinue antihistamines 7 days prior to the appointment.     Thank you for trusting us with your Allergy, Asthma, and Immunology care. Please feel free to contact us with any questions or concerns you may have.      - Symbicort 160/4.5mcg 2 puffs inhaled twice daily with a spacer.   - Singulair 10mg by mouth daily at night.   - Spiriva 2.5mcg 2 puffs daily.   - Continue Flonase and Claritin-D.   - Script for prednisone given.

## 2021-07-08 ASSESSMENT — ASTHMA QUESTIONNAIRES: ACT_TOTALSCORE: 23

## 2021-09-02 ENCOUNTER — MYC MEDICAL ADVICE (OUTPATIENT)
Dept: ALLERGY | Facility: OTHER | Age: 66
End: 2021-09-02

## 2021-09-02 DIAGNOSIS — B37.0 THRUSH: Primary | ICD-10-CM

## 2021-09-03 RX ORDER — FLUCONAZOLE 100 MG/1
100 TABLET ORAL DAILY
Qty: 15 TABLET | Refills: 0 | Status: SHIPPED | OUTPATIENT
Start: 2021-09-03 | End: 2021-09-18

## 2021-09-03 NOTE — TELEPHONE ENCOUNTER
Routing to provider to see if Rx can be sent for patient for thrush. Correct pharmacy is defaulted.       LV: 7/7/21  FV: n/a    Mallika Boyer MA

## 2021-09-10 DIAGNOSIS — J45.40 MODERATE PERSISTENT ASTHMA WITHOUT COMPLICATION: ICD-10-CM

## 2021-09-12 ENCOUNTER — MYC MEDICAL ADVICE (OUTPATIENT)
Dept: ALLERGY | Facility: OTHER | Age: 66
End: 2021-09-12

## 2021-09-12 DIAGNOSIS — J45.40 MODERATE PERSISTENT ASTHMA WITHOUT COMPLICATION: ICD-10-CM

## 2021-09-13 RX ORDER — BUDESONIDE AND FORMOTEROL FUMARATE DIHYDRATE 160; 4.5 UG/1; UG/1
2 AEROSOL RESPIRATORY (INHALATION) 2 TIMES DAILY
Qty: 10.2 G | Refills: 1 | Status: SHIPPED | OUTPATIENT
Start: 2021-09-13 | End: 2021-11-16

## 2021-09-13 NOTE — TELEPHONE ENCOUNTER
Requested Prescriptions   Pending Prescriptions Disp Refills     budesonide-formoterol (SYMBICORT) 160-4.5 MCG/ACT Inhaler 10.2 g 1     Sig: Inhale 2 puffs into the lungs 2 times daily       There is no refill protocol information for this order

## 2021-09-13 NOTE — TELEPHONE ENCOUNTER
Signed Prescriptions:                        Disp   Refills    budesonide-formoterol (SYMBICORT) 160-4.5 *10.2 g 1        Sig: Inhale 2 puffs into the lungs 2 times daily  Authorizing Provider: ERLINDA QUIROZ  Ordering User: SHAQUILLE BECERRA RN refilled medication per [facility] Refill Protocol.     .[email or web address]

## 2021-09-14 RX ORDER — BUDESONIDE AND FORMOTEROL FUMARATE DIHYDRATE 160; 4.5 UG/1; UG/1
2 AEROSOL RESPIRATORY (INHALATION) 2 TIMES DAILY
Qty: 10.2 G | Refills: 1 | OUTPATIENT
Start: 2021-09-14

## 2021-09-14 NOTE — TELEPHONE ENCOUNTER
Refused Prescriptions:                       Disp   Refills    budesonide-formoterol (SYMBICORT) 160-4.5 *10.2 g 1        Sig: Inhale 2 puffs into the lungs 2 times daily  Refused By: SHAQUILLE CONDON  Reason for Refusal: Duplicate  Reason for Refusal Comment: see rx sent 9/13/21    Shaquille Condon, RN

## 2021-10-02 ENCOUNTER — HEALTH MAINTENANCE LETTER (OUTPATIENT)
Age: 66
End: 2021-10-02

## 2021-10-09 DIAGNOSIS — R60.9 EDEMA, UNSPECIFIED TYPE: ICD-10-CM

## 2021-10-11 RX ORDER — TRIAMTERENE AND HYDROCHLOROTHIAZIDE 37.5; 25 MG/1; MG/1
CAPSULE ORAL
Qty: 180 CAPSULE | Refills: 1 | Status: SHIPPED | OUTPATIENT
Start: 2021-10-11 | End: 2022-03-24

## 2021-10-11 NOTE — TELEPHONE ENCOUNTER
"Requested Prescriptions   Pending Prescriptions Disp Refills     triamterene-HCTZ (DYAZIDE) 37.5-25 MG capsule [Pharmacy Med Name: TRIAMTERENE-HCTZ 37.5-25MG CAPS] 180 capsule 0     Sig: TAKE TWO CAPSULES BY MOUTH EVERY DAY AS NEEDED FOR FLUID RETENTION       Diuretics (Including Combos) Protocol Failed - 10/9/2021  8:18 PM        Failed - Normal serum creatinine on file in past 12 months     Recent Labs   Lab Test 05/30/14  0000   CR 0.74              Failed - Normal serum potassium on file in past 12 months     Recent Labs   Lab Test 05/30/14  0000   POTASSIUM 3.6                    Failed - Normal serum sodium on file in past 12 months     No lab results found.           Passed - Blood pressure under 140/90 in past 12 months     BP Readings from Last 3 Encounters:   07/07/21 103/65   04/07/21 102/64   02/16/21 130/70                 Passed - Recent (12 mo) or future (30 days) visit within the authorizing provider's specialty     Patient has had an office visit with the authorizing provider or a provider within the authorizing providers department within the previous 12 mos or has a future within next 30 days. See \"Patient Info\" tab in inbasket, or \"Choose Columns\" in Meds & Orders section of the refill encounter.              Passed - Medication is active on med list        Passed - Patient is age 18 or older        Passed - No active pregancy on record        Passed - No positive pregnancy test in past 12 months           Last Written Prescription Date:  7/25/21  Last Fill Quantity: 180,  # refills: 0   Last office visit: 2/16/2021 with prescribing provider:  LAINA Simmons NP   Future Office Visit:        Are you wanting to continue refills until next annual due in Feb 2022? Not enough were sent at time of annual.    Kerry Dewitt RN on 10/11/2021 at 12:15 PM      "

## 2021-11-16 DIAGNOSIS — J45.40 MODERATE PERSISTENT ASTHMA WITHOUT COMPLICATION: ICD-10-CM

## 2021-11-16 RX ORDER — BUDESONIDE AND FORMOTEROL FUMARATE DIHYDRATE 160; 4.5 UG/1; UG/1
2 AEROSOL RESPIRATORY (INHALATION) 2 TIMES DAILY
Qty: 10.2 G | Refills: 1 | Status: SHIPPED | OUTPATIENT
Start: 2021-11-16 | End: 2022-03-15

## 2021-11-16 NOTE — TELEPHONE ENCOUNTER
Pending Prescriptions:                       Disp   Refills    budesonide-formoterol (SYMBICORT) 160-4.5 *10.2 g 1        Sig: Inhale 2 puffs into the lungs 2 times daily    Routing refill request to provider for review/approval because:  Specialty medication. Patient requesting refill

## 2021-11-16 NOTE — TELEPHONE ENCOUNTER
Signed Prescriptions:                        Disp   Refills    budesonide-formoterol (SYMBICORT) 160-4.5 *10.2 g 1        Sig: Inhale 2 puffs into the lungs 2 times daily  Authorizing Provider: ERLINDA QUIROZ  Ordering User: SHAQUILLE CONDON RN refilled medication per [facility] Refill Protocol.     Shaquille Condon RN

## 2022-02-21 ENCOUNTER — MYC MEDICAL ADVICE (OUTPATIENT)
Dept: OPHTHALMOLOGY | Facility: CLINIC | Age: 67
End: 2022-02-21
Payer: COMMERCIAL

## 2022-02-21 DIAGNOSIS — H04.123 DRY EYE SYNDROME OF BOTH EYES: ICD-10-CM

## 2022-02-21 DIAGNOSIS — H16.9 KERATITIS: ICD-10-CM

## 2022-02-21 DIAGNOSIS — B00.52 HERPES SIMPLEX DISCIFORM KERATITIS: ICD-10-CM

## 2022-02-21 NOTE — TELEPHONE ENCOUNTER
Pending Prescriptions:                       Disp   Refills    erythromycin (ROMYCIN) 5 MG/GM ophthalmic *3.5 g  11       Si application to each eye at bedtime    acyclovir (ZOVIRAX) 400 MG tablet          60 tab*11       Sig: Take 1 tablet (400 mg) by mouth 2 times daily Please           keep appt 21    Routing refill request to provider for review/approval because:  Drug not on the [facility] refill protocol   Labs not current:  Creatinine

## 2022-02-22 RX ORDER — ERYTHROMYCIN 5 MG/G
OINTMENT OPHTHALMIC
Qty: 3.5 G | Refills: 11 | Status: SHIPPED | OUTPATIENT
Start: 2022-02-22 | End: 2022-02-24

## 2022-02-22 RX ORDER — ACYCLOVIR 400 MG/1
400 TABLET ORAL 2 TIMES DAILY
Qty: 60 TABLET | Refills: 11 | Status: SHIPPED | OUTPATIENT
Start: 2022-02-22 | End: 2022-02-24

## 2022-02-23 DIAGNOSIS — N95.1 SYMPTOMATIC MENOPAUSAL OR FEMALE CLIMACTERIC STATES: ICD-10-CM

## 2022-02-23 RX ORDER — ESTRADIOL 0.03 MG/D
PATCH, EXTENDED RELEASE TRANSDERMAL
Qty: 24 PATCH | Refills: 0 | Status: SHIPPED | OUTPATIENT
Start: 2022-02-23 | End: 2022-03-24

## 2022-02-23 NOTE — TELEPHONE ENCOUNTER
"Requested Prescriptions   Pending Prescriptions Disp Refills     HARVEY 0.025 MG/24HR bi-weekly patch [Pharmacy Med Name: HARVEY 0.025MG/24HR PTTW] 24 patch 2     Sig: APPLY 1 PATCH TO THE SKIN TWICE A WEEK       Hormone Replacement Therapy Passed - 2/23/2022  3:42 PM        Passed - Blood pressure under 140/90 in past 12 months     BP Readings from Last 3 Encounters:   07/07/21 103/65   04/07/21 102/64   02/16/21 130/70                 Passed - Recent (12 mo) or future (30 days) visit within the authorizing provider's specialty     Patient has had an office visit with the authorizing provider or a provider within the authorizing providers department within the previous 12 mos or has a future within next 30 days. See \"Patient Info\" tab in inbasket, or \"Choose Columns\" in Meds & Orders section of the refill encounter.              Passed - Patient has mammogram in past 2 years on file if age 50-75        Passed - Medication is active on med list        Passed - Patient is 18 years of age or older        Passed - No active pregnancy on record        Passed - No positive pregnancy test on record in past 12 months               Prescription approved per [facility] Refill Protocol.    Urvashi Rutherford RN    "

## 2022-02-24 RX ORDER — ACYCLOVIR 400 MG/1
400 TABLET ORAL 2 TIMES DAILY
Qty: 60 TABLET | Refills: 11 | Status: SHIPPED | OUTPATIENT
Start: 2022-02-24 | End: 2023-03-16

## 2022-02-24 RX ORDER — ERYTHROMYCIN 5 MG/G
OINTMENT OPHTHALMIC
Qty: 3.5 G | Refills: 11 | Status: SHIPPED | OUTPATIENT
Start: 2022-02-24 | End: 2023-03-03

## 2022-03-15 DIAGNOSIS — J45.40 MODERATE PERSISTENT ASTHMA WITHOUT COMPLICATION: ICD-10-CM

## 2022-03-15 RX ORDER — BUDESONIDE AND FORMOTEROL FUMARATE DIHYDRATE 160; 4.5 UG/1; UG/1
2 AEROSOL RESPIRATORY (INHALATION) 2 TIMES DAILY
Qty: 10.2 G | Refills: 0 | Status: SHIPPED | OUTPATIENT
Start: 2022-03-15 | End: 2022-04-12

## 2022-03-15 NOTE — CONFIDENTIAL NOTE
Pending Prescriptions:                       Disp   Refills    budesonide-formoterol (SYMBICORT) 160-4.5*10.2 g 1            Sig: Inhale 2 puffs into the lungs 2 times daily    Routing refill request to provider for review/approval because:  Patient needs to be seen because:  Last seen 7/7/21 with Jemal. No future appt. At this time.     Kesha RAHMAN RN, [facility] 03/15/22 9:42 AM

## 2022-03-15 NOTE — TELEPHONE ENCOUNTER
Requested Prescriptions   Pending Prescriptions Disp Refills     budesonide-formoterol (SYMBICORT) 160-4.5 MCG/ACT Inhaler 10.2 g 1     Sig: Inhale 2 puffs into the lungs 2 times daily       There is no refill protocol information for this order            Mallika Boyer MA

## 2022-03-15 NOTE — CONFIDENTIAL NOTE
Called patient and scheduled future appt with Dr. Patel.     Kesha RAHMAN RN, [facility] 03/15/22 10:34 AM

## 2022-03-19 ENCOUNTER — HEALTH MAINTENANCE LETTER (OUTPATIENT)
Age: 67
End: 2022-03-19

## 2022-03-22 DIAGNOSIS — J45.40 MODERATE PERSISTENT ASTHMA WITHOUT COMPLICATION: ICD-10-CM

## 2022-03-22 RX ORDER — BUDESONIDE AND FORMOTEROL FUMARATE DIHYDRATE 160; 4.5 UG/1; UG/1
2 AEROSOL RESPIRATORY (INHALATION) 2 TIMES DAILY
Qty: 10.2 G | Refills: 0 | OUTPATIENT
Start: 2022-03-22

## 2022-03-22 NOTE — TELEPHONE ENCOUNTER
"Requested Prescriptions   Pending Prescriptions Disp Refills     budesonide-formoterol (SYMBICORT) 160-4.5 MCG/ACT Inhaler 10.2 g 0     Sig: Inhale 2 puffs into the lungs 2 times daily       Inhaled Steroids Protocol Failed - 3/22/2022  7:52 AM        Failed - Asthma control assessment score within normal limits in last 6 months     Please review ACT score.           Passed - Patient is age 12 or older        Passed - Medication is active on med list        Passed - Recent (6 mo) or future (30 days) visit within the authorizing provider's specialty     Patient had office visit in the last 6 months or has a visit in the next 30 days with authorizing provider or within the authorizing provider's specialty.  See \"Patient Info\" tab in inbasket, or \"Choose Columns\" in Meds & Orders section of the refill encounter.           Long-Acting Beta Agonist Inhalers Protocol  Failed - 3/22/2022  7:52 AM        Failed - Asthma control assessment score within normal limits in last 6 months     Please review ACT score.           Passed - Patient is age 12 or older        Passed - Medication is active on med list        Passed - Recent (6 mo) or future (30 days) visit within the authorizing provider's specialty     Patient had office visit in the last 6 months or has a visit in the next 30 days with authorizing provider or within the authorizing provider's specialty.  See \"Patient Info\" tab in inbasket, or \"Choose Columns\" in Meds & Orders section of the refill encounter.               Routing refill request to provider for review/approval because:  Last ACT greater than 6 months ago.  ACT=23 on 7/7/21    Appointment scheduled with Dr. Patel on 4/12/22    Norma Nunez, ELADIAN, RN        "

## 2022-03-23 NOTE — PROGRESS NOTES
Heather is a 66 year old  female who presents for annual exam.     Besides routine health maintenance, she has no other health concerns today .    HPI:  The patient's PCP is Dr Dunn. Patient is here for her annual exam. She is overdue for dexa scan so she agreed to have this done also. She is doing well. She has had a hysterectomy. Is on Katelin patch for HRT. Recently retired and has lost 40lbs.  Walking everyv. Lab work done by cardiology specialist.     GYNECOLOGIC HISTORY:    No LMP recorded. Patient is postmenopausal.    Her current contraception method is: menopause.  She  reports that she has quit smoking. She has never used smokeless tobacco.    Patient is sexually active.  STD testing offered?  Declined  Last PHQ-9 score on record =   PHQ-9 SCORE 3/24/2022   PHQ-9 Total Score 0     Last GAD7 score on record =   ERICA-7 SCORE 3/24/2022   Total Score 0     Alcohol Score = 3    HEALTH MAINTENANCE:  Cholesterol:   Recent Labs   Lab Test 16  1055 14  0000   CHOL 223* 219   HDL 60 74   * 131   TRIG 134 69     Last Mammo: One year ago, Result: Normal, Next Mammo: Today   Pap:   Lab Results   Component Value Date    PAP NIL HPV- 2021    PAP NIL HPV- 2019    PAP NIL HPV-  10/19/2017     Colonoscopy:  N/A, Result: N/A, Next Colonoscopy: Patient declined   Dexa:     Health maintenance updated:  Yes    HISTORY:  OB History    Para Term  AB Living   4 4 4 0 0 4   SAB IAB Ectopic Multiple Live Births   0 0 0 0 4      # Outcome Date GA Lbr Giuseppe/2nd Weight Sex Delivery Anes PTL Lv   4 Term            3 Term            2 Term            1 Term                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     Allergic rhinitis due to mold     Allergic rhinitis due to animal dander     Thrush     Latex allergy     Past  Surgical History:   Procedure Laterality Date     CYSTOSCOPY  5/11/2012    Procedure:CYSTOSCOPY; Surgeon:RADHA COMER; Location:[facility]     GYN SURGERY      csection, infertility, hyst     HYSTERECTOMY  1992     HYSTERECTOMY, PAP NO LONGER INDICATED       SLING TRANSVAGINAL  5/11/2012    Procedure:SLING TRANSVAGINAL; mini arc sling  and cystoscopy; Surgeon:RADHA COMER; Location:[facility]      Social History     Tobacco Use     Smoking status: Former Smoker     Smokeless tobacco: Never Used   Substance Use Topics     Alcohol use: Yes      Problem (# of Occurrences) Relation (Name,Age of Onset)    No Known Problems (9) Mother, Father, Sister, Brother, Maternal Grandmother, Maternal Grandfather, Paternal Grandmother, Paternal Grandfather, Other       Negative family history of: Glaucoma, Macular Degeneration            Current Outpatient Medications   Medication Sig     acyclovir (ZOVIRAX) 400 MG tablet Take 1 tablet (400 mg) by mouth 2 times daily Please keep appt 2/19/21     albuterol (ACCUNEB) 1.25 MG/3ML neb solution Take 2 vials (2.5 mg) by nebulization every 4 hours as needed for shortness of breath / dyspnea or wheezing     albuterol (PROAIR HFA/PROVENTIL HFA/VENTOLIN HFA) 108 (90 Base) MCG/ACT inhaler Inhale 2 puffs into the lungs every 4 hours as needed for shortness of breath / dyspnea or wheezing     budesonide-formoterol (SYMBICORT) 160-4.5 MCG/ACT Inhaler Inhale 2 puffs into the lungs 2 times daily     Cholecalciferol (VITAMIN D-3 PO) Take 1 tablet by mouth     CLARITIN-D 24 HOUR  MG per 24 hr tablet TK 1 T PO  D     dexamethasone (DECADRON) 0.1 % ophthalmic solution Place 1 drop into the right eye every other day     EPINEPHrine (AUVI-Q) 0.3 MG/0.3ML injection 2-pack Inject 0.3 mLs (0.3 mg) into the muscle as needed for anaphylaxis     erythromycin (ROMYCIN) 5 MG/GM ophthalmic ointment 1 application to each eye at bedtime     estradiol (HARVEY) 0.025 MG/24HR bi-weekly patch APPLY 1 PATCH  "TO THE SKIN TWICE A WEEK     fluticasone (FLONASE) 50 MCG/ACT nasal spray Spray 2 sprays into both nostrils daily     glucagon 1 MG kit Inject 1mg as needed for anaphylaxis that is non-responding to epinephrine.     Hypromellose (ARTIFICIAL TEARS OP) Apply 1 drop to eye as needed Usually every 30 minutes.     metoprolol tartrate (LOPRESSOR) 25 MG tablet      montelukast (SINGULAIR) 10 MG tablet Take 1 tablet (10 mg) by mouth At Bedtime     order for DME Equipment being ordered: Nebulizer     phenazopyridine (PYRIDIUM) 100 MG tablet Take 2 tablets (200 mg) by mouth 3 times daily as needed     tiotropium (SPIRIVA RESPIMAT) 2.5 MCG/ACT inhaler Inhale 2 puffs into the lungs daily     triamterene-HCTZ (DYAZIDE) 37.5-25 MG capsule TAKE TWO CAPSULES BY MOUTH EVERY DAY AS NEEDED FOR FLUID RETENTION     No current facility-administered medications for this visit.     Allergies   Allergen Reactions     Latex Anaphylaxis     Xray Dye  [Contrast Dye] Hives     Pt states she had allergic reaction to xray day several years ago (hives) Pt has been successfully premedicated for contrast allergy without problem.      Sulfa Drugs      Penicillins Rash     Other reaction(s): Swelling, lips/tongue  Childhood rash       Past medical, surgical, social and family histories were reviewed and updated in EPIC.    ROS:   12 point review of systems negative other than symptoms noted below or in the HPI.  No urinary frequency or dysuria, bladder or kidney problems    EXAM:  /82   Pulse 62   Ht 1.702 m (5' 7\")   Wt 73 kg (161 lb)   BMI 25.22 kg/m     BMI: Body mass index is 25.22 kg/m .    PHYSICAL EXAM:  Constitutional:   Appearance: Well nourished, well developed, alert, in no acute distress  Neck:  Lymph Nodes:  No lymphadenopathy present    Thyroid:  Gland size normal, nontender, no nodules or masses present  on palpation  Chest:  Respiratory Effort:  Breathing unlabored  Cardiovascular:    Heart: Auscultation:  Regular rate, " normal rhythm, no murmurs present  Breasts: Inspection of Breasts:  No lymphadenopathy present., Palpation of Breasts and Axillae:  No masses present on palpation, no breast tenderness., Axillary Lymph Nodes:  No lymphadenopathy present. and No nodularity, asymmetry or nipple discharge bilaterally.  Gastrointestinal:   Abdominal Examination:  Abdomen nontender to palpation, tone normal without rigidity or guarding, no masses present, umbilicus without lesions   Liver and Spleen:  No hepatomegaly present, liver nontender to palpation    Hernias:  No hernias present  Lymphatic: Lymph Nodes:  No other lymphadenopathy present  Skin:  General Inspection:  No rashes present, no lesions present, no areas of  discoloration  Neurologic:    Mental Status:  Oriented X3.  Normal strength and tone, sensory exam                grossly normal, mentation intact and speech normal.    Psychiatric:   Mentation appears normal and affect normal/bright.         Pelvic Exam:  External Genitalia:     Normal appearance for age, no discharge present, no tenderness present, no inflammatory lesions present, color normal  Vagina:     Normal vaginal vault without central or paravaginal defects, no discharge present, no inflammatory lesions present, no masses present  Bladder:     Nontender to palpation  Urethra:   Urethral Body:  Urethra palpation normal, urethra structural support normal   Urethral Meatus:  No erythema or lesions present  Cervix:     Surgically absent  Uterus:     Surgically absent  Adnexa:     Surgically absent  Perineum:     Perineum within normal limits, no evidence of trauma, no rashes or skin lesions present  Anus:     Anus within normal limits, no hemorrhoids present  Inguinal Lymph Nodes:     No lymphadenopathy present  Pubic Hair:     Normal pubic hair distribution for age  Genitalia and Groin:     No rashes present, no lesions present, no areas of discoloration, no masses present      COUNSELING:   Reviewed preventive  health counseling, as reflected in patient instructions       Regular exercise       Healthy diet/nutrition       Osteoporosis prevention/bone health    BMI: Body mass index is 25.22 kg/m .  Weight management plan: Discussed healthy diet and exercise guidelines    ASSESSMENT:  66 year old female with satisfactory annual exam.    ICD-10-CM    1. Encounter for gynecological examination without abnormal finding  Z01.419    2. Special screening for osteoporosis  Z13.820 DX Hip/Pelvis/Spine   3. Other specified menopausal and perimenopausal disorders   N95.8 DX Hip/Pelvis/Spine   4. Screening for cervical cancer  Z12.4 Pap screen with HPV - recommended age 30 - 65 years   5. Symptomatic menopausal or female climacteric states  N95.1 estradiol (HARVEY) 0.025 MG/24HR bi-weekly patch   6. Edema, unspecified type  R60.9 triamterene-HCTZ (DYAZIDE) 37.5-25 MG capsule       PLAN: Normal gyn exam. Will notify patient of lab results once available. Explained to patient that since she has a hysterectomy she can have a pap done every other year rather than annually, it is up to her. No further questions or concerns.   Return prn or 1 year for annual.      Nydia Simmons, APRN CNP

## 2022-03-24 ENCOUNTER — ANCILLARY PROCEDURE (OUTPATIENT)
Dept: BONE DENSITY | Facility: CLINIC | Age: 67
End: 2022-03-24
Payer: COMMERCIAL

## 2022-03-24 ENCOUNTER — ANCILLARY PROCEDURE (OUTPATIENT)
Dept: MAMMOGRAPHY | Facility: CLINIC | Age: 67
End: 2022-03-24
Payer: COMMERCIAL

## 2022-03-24 ENCOUNTER — OFFICE VISIT (OUTPATIENT)
Dept: OBGYN | Facility: CLINIC | Age: 67
End: 2022-03-24
Payer: COMMERCIAL

## 2022-03-24 VITALS
WEIGHT: 161 LBS | HEART RATE: 62 BPM | DIASTOLIC BLOOD PRESSURE: 82 MMHG | SYSTOLIC BLOOD PRESSURE: 124 MMHG | HEIGHT: 67 IN | BODY MASS INDEX: 25.27 KG/M2

## 2022-03-24 DIAGNOSIS — R60.9 EDEMA, UNSPECIFIED TYPE: ICD-10-CM

## 2022-03-24 DIAGNOSIS — Z12.31 VISIT FOR SCREENING MAMMOGRAM: ICD-10-CM

## 2022-03-24 DIAGNOSIS — N95.8 OTHER SPECIFIED MENOPAUSAL AND PERIMENOPAUSAL DISORDERS: ICD-10-CM

## 2022-03-24 DIAGNOSIS — N95.1 SYMPTOMATIC MENOPAUSAL OR FEMALE CLIMACTERIC STATES: ICD-10-CM

## 2022-03-24 DIAGNOSIS — Z12.4 SCREENING FOR CERVICAL CANCER: ICD-10-CM

## 2022-03-24 DIAGNOSIS — Z13.820 SPECIAL SCREENING FOR OSTEOPOROSIS: ICD-10-CM

## 2022-03-24 DIAGNOSIS — Z01.419 ENCOUNTER FOR GYNECOLOGICAL EXAMINATION WITHOUT ABNORMAL FINDING: Primary | ICD-10-CM

## 2022-03-24 PROCEDURE — 77080 DXA BONE DENSITY AXIAL: CPT | Performed by: OBSTETRICS & GYNECOLOGY

## 2022-03-24 PROCEDURE — G0145 SCR C/V CYTO,THINLAYER,RESCR: HCPCS | Performed by: NURSE PRACTITIONER

## 2022-03-24 PROCEDURE — 99397 PER PM REEVAL EST PAT 65+ YR: CPT | Performed by: NURSE PRACTITIONER

## 2022-03-24 PROCEDURE — 77063 BREAST TOMOSYNTHESIS BI: CPT | Mod: TC | Performed by: RADIOLOGY

## 2022-03-24 PROCEDURE — 87624 HPV HI-RISK TYP POOLED RSLT: CPT | Performed by: NURSE PRACTITIONER

## 2022-03-24 PROCEDURE — 77067 SCR MAMMO BI INCL CAD: CPT | Mod: TC | Performed by: RADIOLOGY

## 2022-03-24 RX ORDER — ESTRADIOL 0.03 MG/D
FILM, EXTENDED RELEASE TRANSDERMAL
Qty: 24 PATCH | Refills: 3 | Status: SHIPPED | OUTPATIENT
Start: 2022-03-24 | End: 2023-04-12

## 2022-03-24 RX ORDER — TRIAMTERENE AND HYDROCHLOROTHIAZIDE 37.5; 25 MG/1; MG/1
CAPSULE ORAL
Qty: 180 CAPSULE | Refills: 1 | Status: SHIPPED | OUTPATIENT
Start: 2022-03-24 | End: 2022-10-12

## 2022-03-24 ASSESSMENT — ANXIETY QUESTIONNAIRES
GAD7 TOTAL SCORE: 0
6. BECOMING EASILY ANNOYED OR IRRITABLE: NOT AT ALL
IF YOU CHECKED OFF ANY PROBLEMS ON THIS QUESTIONNAIRE, HOW DIFFICULT HAVE THESE PROBLEMS MADE IT FOR YOU TO DO YOUR WORK, TAKE CARE OF THINGS AT HOME, OR GET ALONG WITH OTHER PEOPLE: NOT DIFFICULT AT ALL
5. BEING SO RESTLESS THAT IT IS HARD TO SIT STILL: NOT AT ALL
7. FEELING AFRAID AS IF SOMETHING AWFUL MIGHT HAPPEN: NOT AT ALL
2. NOT BEING ABLE TO STOP OR CONTROL WORRYING: NOT AT ALL
3. WORRYING TOO MUCH ABOUT DIFFERENT THINGS: NOT AT ALL
1. FEELING NERVOUS, ANXIOUS, OR ON EDGE: NOT AT ALL

## 2022-03-24 ASSESSMENT — PATIENT HEALTH QUESTIONNAIRE - PHQ9
5. POOR APPETITE OR OVEREATING: NOT AT ALL
SUM OF ALL RESPONSES TO PHQ QUESTIONS 1-9: 0

## 2022-03-25 ASSESSMENT — ANXIETY QUESTIONNAIRES: GAD7 TOTAL SCORE: 0

## 2022-03-28 LAB
BKR LAB AP GYN ADEQUACY: NORMAL
BKR LAB AP GYN INTERPRETATION: NORMAL
BKR LAB AP HPV REFLEX: NORMAL
BKR LAB AP PREVIOUS ABNORMAL: NORMAL
PATH REPORT.COMMENTS IMP SPEC: NORMAL
PATH REPORT.COMMENTS IMP SPEC: NORMAL
PATH REPORT.RELEVANT HX SPEC: NORMAL

## 2022-03-30 LAB
HUMAN PAPILLOMA VIRUS 16 DNA: NEGATIVE
HUMAN PAPILLOMA VIRUS 18 DNA: NEGATIVE
HUMAN PAPILLOMA VIRUS FINAL DIAGNOSIS: NORMAL
HUMAN PAPILLOMA VIRUS OTHER HR: NEGATIVE

## 2022-03-31 ENCOUNTER — OFFICE VISIT (OUTPATIENT)
Dept: OPHTHALMOLOGY | Facility: CLINIC | Age: 67
End: 2022-03-31
Attending: OPHTHALMOLOGY
Payer: COMMERCIAL

## 2022-03-31 DIAGNOSIS — H04.123 DRY EYE SYNDROME OF BOTH EYES: Primary | ICD-10-CM

## 2022-03-31 DIAGNOSIS — H16.9 KERATITIS: ICD-10-CM

## 2022-03-31 DIAGNOSIS — H02.59 FLOPPY EYELID SYNDROME OF BOTH EYES: ICD-10-CM

## 2022-03-31 PROCEDURE — 99214 OFFICE O/P EST MOD 30 MIN: CPT | Performed by: OPHTHALMOLOGY

## 2022-03-31 PROCEDURE — G0463 HOSPITAL OUTPT CLINIC VISIT: HCPCS

## 2022-03-31 ASSESSMENT — EXTERNAL EXAM - LEFT EYE: OS_EXAM: NO VESICULAR LESIONS

## 2022-03-31 ASSESSMENT — REFRACTION_WEARINGRX
OD_AXIS: 019
OS_ADD: +2.50
OD_CYLINDER: +0.25
OD_SPHERE: +6.25
OD_ADD: +2.50
OS_CYLINDER: +0.50
OS_SPHERE: +6.00
SPECS_TYPE: PAL
OS_AXIS: 040

## 2022-03-31 ASSESSMENT — CONF VISUAL FIELD
OS_NORMAL: 1
OD_NORMAL: 1
METHOD: COUNTING FINGERS

## 2022-03-31 ASSESSMENT — EXTERNAL EXAM - RIGHT EYE: OD_EXAM: NO VESICULAR LESIONS

## 2022-03-31 ASSESSMENT — VISUAL ACUITY
OS_CC: 20/25
METHOD: SNELLEN - LINEAR
CORRECTION_TYPE: GLASSES
OD_CC: 20/25

## 2022-03-31 ASSESSMENT — TONOMETRY
OD_IOP_MMHG: 09
IOP_METHOD: ICARE
OS_IOP_MMHG: 09

## 2022-03-31 NOTE — PROGRESS NOTES
HPI: Heather Wilkinson is a 65 year old female who presents for fuv.    Interval hx:   -Reports overall doing well. Three weeks ago noticed some irritation symptoms. No pain. She  Has since re-started daily dexamethasone drops which helped a lot. Now her sx's are resolved. No new flashes, floaters, or diplopia. No pain, redness, or tearing.     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 doing well  # Dry eye syndrome right eye > left eye   # Floppy eyelid syndrome  # Oral HSV, resolved  - herpetic keratitis previously  -likely worsened with other confounding issues:  -incomplete blink, floppy eyelids with exposure pattern OU, hx of cold sores with suspect viral keratitis recently and previously weekly CL use (>15 years)       -permanent silicone plug in place RIGHT LOWER LID   -continue acyclovir 400 mg PO BID for ppx (this is also an appropriate dose for oral mucocutaneous lesions, which pt would like to continue) - No cold sores since started   -continue dexamethasone 0.1% every day PRN irritation. Discussed risk of prolonged steroid use.    -continue PF AT QID and as needed    -continue EES or Refresh PM at bedtime/flat nightmask   -recommend using eyelid scrubs to remove makeup nightly    Return to clinic: Annually, sooner if problems     Alexander landa MD, PhD    Attending Physician Attestation:  Complete documentation of historical and exam elements from today's encounter can be found in the full encounter summary report (not reduplicated in this progress note).  I personally obtained the chief complaint(s) and history of present illness.  I confirmed and edited as  necessary the review of systems, past medical/surgical history, family history, social history, and examination findings as documented by others; and I examined the patient myself.  I personally reviewed the relevant tests, images, and reports as documented above.  I formulated and edited as necessary the assessment and plan and discussed the findings and management plan with the patient and family. - Alexander Rodriguez MD

## 2022-03-31 NOTE — NURSING NOTE
"Chief Complaints and History of Present Illnesses   Patient presents with     Keratitis Follow Up     1 year follow up RE.     Chief Complaint(s) and History of Present Illness(es)     Keratitis Follow Up     Comments: 1 year follow up RE.              Comments     Pt states vision is about the same as last visit. No eye pain today, but pt does note that she has wakes up with \"sticky\" sensation in her RE.  No new flashes or floaters.     JOSE Arambula March 31, 2022 8:16 AM                    "

## 2022-04-12 ENCOUNTER — OFFICE VISIT (OUTPATIENT)
Dept: ALLERGY | Facility: OTHER | Age: 67
End: 2022-04-12
Payer: COMMERCIAL

## 2022-04-12 VITALS
DIASTOLIC BLOOD PRESSURE: 63 MMHG | BODY MASS INDEX: 23.86 KG/M2 | SYSTOLIC BLOOD PRESSURE: 98 MMHG | OXYGEN SATURATION: 97 % | HEART RATE: 64 BPM | HEIGHT: 67 IN | WEIGHT: 152 LBS

## 2022-04-12 DIAGNOSIS — J45.40 MODERATE PERSISTENT ASTHMA WITHOUT COMPLICATION: Primary | ICD-10-CM

## 2022-04-12 DIAGNOSIS — J30.1 SEASONAL ALLERGIC RHINITIS DUE TO POLLEN: ICD-10-CM

## 2022-04-12 DIAGNOSIS — J30.89 ALLERGIC RHINITIS CAUSED BY MOLD: ICD-10-CM

## 2022-04-12 DIAGNOSIS — J30.81 ALLERGIC RHINITIS DUE TO ANIMALS: ICD-10-CM

## 2022-04-12 DIAGNOSIS — Z91.018 FOOD ALLERGY: ICD-10-CM

## 2022-04-12 PROCEDURE — 99214 OFFICE O/P EST MOD 30 MIN: CPT | Performed by: ALLERGY & IMMUNOLOGY

## 2022-04-12 RX ORDER — BUDESONIDE AND FORMOTEROL FUMARATE DIHYDRATE 160; 4.5 UG/1; UG/1
2 AEROSOL RESPIRATORY (INHALATION) 2 TIMES DAILY
Qty: 10.2 G | Refills: 0 | Status: SHIPPED | OUTPATIENT
Start: 2022-04-12 | End: 2022-05-25

## 2022-04-12 RX ORDER — ALBUTEROL SULFATE 0.83 MG/ML
2.5 SOLUTION RESPIRATORY (INHALATION) EVERY 4 HOURS PRN
Qty: 120 ML | Refills: 1 | Status: SHIPPED | OUTPATIENT
Start: 2022-04-12

## 2022-04-12 RX ORDER — ALBUTEROL SULFATE 1.25 MG/3ML
2.5 SOLUTION RESPIRATORY (INHALATION) EVERY 4 HOURS PRN
Status: CANCELLED | OUTPATIENT
Start: 2022-04-12

## 2022-04-12 RX ORDER — MONTELUKAST SODIUM 10 MG/1
10 TABLET ORAL AT BEDTIME
Qty: 30 TABLET | Refills: 11 | Status: SHIPPED | OUTPATIENT
Start: 2022-04-12 | End: 2023-03-07

## 2022-04-12 RX ORDER — FLUTICASONE PROPIONATE 50 MCG
2 SPRAY, SUSPENSION (ML) NASAL DAILY
Qty: 16 G | Refills: 4 | Status: SHIPPED | OUTPATIENT
Start: 2022-04-12 | End: 2024-02-13

## 2022-04-12 RX ORDER — ALBUTEROL SULFATE 90 UG/1
2 AEROSOL, METERED RESPIRATORY (INHALATION) EVERY 4 HOURS PRN
Qty: 18 G | Refills: 3 | Status: SHIPPED | OUTPATIENT
Start: 2022-04-12 | End: 2023-01-09

## 2022-04-12 RX ORDER — EPINEPHRINE 0.3 MG/.3ML
0.3 INJECTION SUBCUTANEOUS PRN
Qty: 2 EACH | Refills: 1 | Status: SHIPPED | OUTPATIENT
Start: 2022-04-12 | End: 2024-02-13

## 2022-04-12 ASSESSMENT — ENCOUNTER SYMPTOMS
ACTIVITY CHANGE: 0
RHINORRHEA: 0
MYALGIAS: 0
SINUS PRESSURE: 0
EYE DISCHARGE: 1
ARTHRALGIAS: 0
CHEST TIGHTNESS: 0
EYE REDNESS: 0
CHILLS: 0
ABDOMINAL PAIN: 0
FACIAL SWELLING: 0
HEADACHES: 0
FEVER: 0
SHORTNESS OF BREATH: 1
EYE ITCHING: 1
COUGH: 1
WHEEZING: 1
NAUSEA: 0
DIARRHEA: 0
VOMITING: 0
ADENOPATHY: 0

## 2022-04-12 ASSESSMENT — ASTHMA QUESTIONNAIRES
QUESTION_3 LAST FOUR WEEKS HOW OFTEN DID YOUR ASTHMA SYMPTOMS (WHEEZING, COUGHING, SHORTNESS OF BREATH, CHEST TIGHTNESS OR PAIN) WAKE YOU UP AT NIGHT OR EARLIER THAN USUAL IN THE MORNING: NOT AT ALL
QUESTION_5 LAST FOUR WEEKS HOW WOULD YOU RATE YOUR ASTHMA CONTROL: WELL CONTROLLED
ACT_TOTALSCORE: 23
QUESTION_1 LAST FOUR WEEKS HOW MUCH OF THE TIME DID YOUR ASTHMA KEEP YOU FROM GETTING AS MUCH DONE AT WORK, SCHOOL OR AT HOME: NONE OF THE TIME
QUESTION_4 LAST FOUR WEEKS HOW OFTEN HAVE YOU USED YOUR RESCUE INHALER OR NEBULIZER MEDICATION (SUCH AS ALBUTEROL): ONCE A WEEK OR LESS
ACT_TOTALSCORE: 23
QUESTION_2 LAST FOUR WEEKS HOW OFTEN HAVE YOU HAD SHORTNESS OF BREATH: NOT AT ALL

## 2022-04-12 NOTE — LETTER
4/12/2022         RE: Heather Wilkinson  [address] [facility] [address]        Dear Colleague,    Thank you for referring your patient, Heather Wilkinson, to the [facility]. Please see a copy of my visit note below.    SUBJECTIVE:                                                                   Heather Wilkinson presents today to our Allergy Clinic at [facility] for a follow-up visit. She is a 66-year-old female with a history of asthma, food allergy, and allergic rhinitis.  Had IgE mediated symptoms with avocado, banana, and latex, manifested by throat closing, lip angioedema, and hives. Required epi in the past. In the past, positive serum IgE for avocado and latex.  History of allergic rhinoconjunctivitis symptoms. Was on allergen immunotherapy in the past, many years ago. Stopped immunotherapy secondary to systemic reactions.  In 2020, serum IgE for regional aeroallergen panel showed sensitivity to molds, cat, dog, grass pollen, and weed pollen.     She didn't need any meds last Winter. She doesn't use intranasal fluticasone and doesn't take loratadine.     History of moderate persistent asthma for years. Was hospitalized as a child. Stayed in ICU but no history of intubations for asthma flares. She smoked 1/2ppd for 20 years. Stopped in her 50's Has a history of recurrent thrush with ICS-containing medications. Her current regimen is Symbicort 160/4.5 mcg 2 puffs twice daily and  montelukast 10 mg by mouth at bedtime. She tried Spiriva for 1 month and stopped it because she didn't feel it was helpful. Current triggers: dog, cats, cold air, humidity, smoke, viral respiratory infections.   In 2020, serum IgE was 330 KiU/L (0-114).  CBC with differential was within normal limits. Absolute eosinophil count was 300. Spirometry in March 2020 suggested mild obstruction without postbronchodilator reversibility.   Heather is using albuterol HFA  less than  twice per week for rescue from chest symptoms. She is waking up less than twice per month due to chest symptoms. There has been no use of oral steroids since the last visit. No ED/PCP/urgent care/other specialist visits for asthma flare since the previous visit. The patient denies current chest tightness, cough, wheeze, or shortness of breath.   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     Allergic rhinitis due to mold     Allergic rhinitis due to animal dander     Thrush     Latex allergy     H/O: hysterectomy       Past Medical History:   Diagnosis Date     Allergic rhinitis      Asthma      Atrial fibrillation (H)      Cough      Fluid retention      Tachycardia       Problem (# of Occurrences) Relation (Name,Age of Onset)    No Known Problems (9) Mother, Father, Sister, Brother, Maternal Grandmother, Maternal Grandfather, Paternal Grandmother, Paternal Grandfather, Other       Negative family history of: Glaucoma, Macular Degeneration        Past Surgical History:   Procedure Laterality Date     CYSTOSCOPY  5/11/2012    Procedure:CYSTOSCOPY; Surgeon:RADHA COMER; Location:[facility]     GYN SURGERY      csection, infertility, hyst     HYSTERECTOMY  1992     HYSTERECTOMY, PAP NO LONGER INDICATED       SLING TRANSVAGINAL  5/11/2012    Procedure:SLING TRANSVAGINAL; mini arc sling  and cystoscopy; Surgeon:RADHA COMER; Location:[facility]     Social History     Socioeconomic History     Marital status:      Spouse name: None     Number of children: 4     Years of education: None     Highest education level: None   Occupational History     Comment: retired   Tobacco Use     Smoking status: Former Smoker     Smokeless tobacco: Never Used   Vaping Use     Vaping Use: Never used   Substance and Sexual Activity     Alcohol use: Yes     Drug use: No      Sexual activity: Yes     Partners: Male     Birth control/protection: None, Post-menopausal, Female Surgical     Comment: YULY   Social History Narrative    ENVIRONMENTAL HISTORY: [place] The home is heated with a forced air. They do have central air conditioning. The patient's bedroom is furnished with carpeting in bedroom.  Pets inside the house include 1 dog(s). There is no history of cockroach or mice infestation. There is/are 0 smokers in the house.  The house does not have a damp basement.               Review of Systems   Constitutional: Negative for activity change, chills and fever.   HENT: Positive for sneezing. Negative for congestion, ear discharge, facial swelling, nosebleeds, postnasal drip, rhinorrhea and sinus pressure.    Eyes: Positive for discharge and itching. Negative for redness.   Respiratory: Positive for cough, shortness of breath and wheezing. Negative for chest tightness.    Cardiovascular: Negative for chest pain.   Gastrointestinal: Negative for abdominal pain, diarrhea, nausea and vomiting.   Musculoskeletal: Negative for arthralgias and myalgias.   Skin: Negative for rash.   Allergic/Immunologic: Positive for environmental allergies.   Neurological: Negative for headaches.   Hematological: Negative for adenopathy.   Psychiatric/Behavioral: Negative for behavioral problems and self-injury.           Current Outpatient Medications:      acyclovir (ZOVIRAX) 400 MG tablet, Take 1 tablet (400 mg) by mouth 2 times daily Please keep appt 2/19/21, Disp: 60 tablet, Rfl: 11     albuterol (PROAIR HFA/PROVENTIL HFA/VENTOLIN HFA) 108 (90 Base) MCG/ACT inhaler, Inhale 2 puffs into the lungs every 4 hours as needed for shortness of breath / dyspnea or wheezing, Disp: 18 g, Rfl: 3     albuterol (PROVENTIL) (2.5 MG/3ML) 0.083% neb solution, Take 1 vial (2.5 mg) by nebulization every 4 hours as needed for shortness of breath / dyspnea or wheezing, Disp: 120  mL, Rfl: 1     Cholecalciferol (VITAMIN D-3 PO), Take 1 tablet by mouth, Disp: , Rfl:      CLARITIN-D 24 HOUR  MG per 24 hr tablet, TK 1 T PO  D, Disp: , Rfl: 0     EPINEPHrine (AUVI-Q) 0.3 MG/0.3ML injection 2-pack, Inject 0.3 mLs (0.3 mg) into the muscle as needed for anaphylaxis, Disp: 2 each, Rfl: 1     erythromycin (ROMYCIN) 5 MG/GM ophthalmic ointment, 1 application to each eye at bedtime, Disp: 3.5 g, Rfl: 11     estradiol (HARVEY) 0.025 MG/24HR bi-weekly patch, APPLY 1 PATCH TO THE SKIN TWICE A WEEK, Disp: 24 patch, Rfl: 3     fluticasone (FLONASE) 50 MCG/ACT nasal spray, Spray 2 sprays into both nostrils daily, Disp: 16 g, Rfl: 4     Hypromellose (ARTIFICIAL TEARS OP), Apply 1 drop to eye as needed Usually every 30 minutes., Disp: , Rfl:      metoprolol tartrate (LOPRESSOR) 25 MG tablet, , Disp: , Rfl:      montelukast (SINGULAIR) 10 MG tablet, Take 1 tablet (10 mg) by mouth At Bedtime, Disp: 30 tablet, Rfl: 11     order for DME, Equipment being ordered: Nebulizer, Disp: 1 Box, Rfl: 0     SYMBICORT 160-4.5 MCG/ACT Inhaler, Inhale 2 puffs into the lungs 2 times daily, Disp: 10.2 g, Rfl: 0     albuterol (ACCUNEB) 1.25 MG/3ML neb solution, Take 2 vials (2.5 mg) by nebulization every 4 hours as needed for shortness of breath / dyspnea or wheezing (Patient not taking: Reported on 4/12/2022), Disp: 30 vial, Rfl: 4     dexamethasone (DECADRON) 0.1 % ophthalmic solution, Place 1 drop into the right eye every other day (Patient not taking: Reported on 4/12/2022), Disp: 5 mL, Rfl: 6     glucagon 1 MG kit, Inject 1mg as needed for anaphylaxis that is non-responding to epinephrine. (Patient not taking: Reported on 4/12/2022), Disp: 1 mg, Rfl: 0     phenazopyridine (PYRIDIUM) 100 MG tablet, Take 2 tablets (200 mg) by mouth 3 times daily as needed (Patient not taking: Reported on 4/12/2022), Disp: 20 tablet, Rfl: 1     triamterene-HCTZ (DYAZIDE) 37.5-25 MG capsule, TAKE TWO CAPSULES BY MOUTH EVERY DAY AS NEEDED FOR  "FLUID RETENTION (Patient not taking: Reported on 4/12/2022), Disp: 180 capsule, Rfl: 1  Immunization History   Administered Date(s) Administered     COVID-19,PF,Pfizer (12+ Yrs) 03/05/2021, 03/26/2021     DTaP, Unspecified 09/05/2014     Pneumococcal 23 valent 10/16/2013     TD (ADULT, 7+) 04/01/2003     Allergies   Allergen Reactions     Latex Anaphylaxis     Xray Dye  [Contrast Dye] Hives     Pt states she had allergic reaction to xray day several years ago (hives) Pt has been successfully premedicated for contrast allergy without problem.      Sulfa Drugs      Penicillins Rash     Other reaction(s): Swelling, lips/tongue  Childhood rash     OBJECTIVE:                                                                 BP 98/63   Pulse 64   Ht 1.702 m (5' 7\")   Wt 68.9 kg (152 lb)   SpO2 97%   BMI 23.81 kg/m          Physical Exam  Vitals and nursing note reviewed.   Constitutional:       General: She is not in acute distress.     Appearance: She is not ill-appearing, toxic-appearing or diaphoretic.   HENT:      Head: Normocephalic and atraumatic.      Right Ear: Tympanic membrane, ear canal and external ear normal.      Left Ear: Tympanic membrane, ear canal and external ear normal.      Nose: No mucosal edema, congestion or rhinorrhea.      Right Turbinates: Not enlarged, swollen or pale.      Left Turbinates: Not enlarged, swollen or pale.      Mouth/Throat:      Lips: Pink.      Mouth: Mucous membranes are moist.      Pharynx: Oropharynx is clear. No pharyngeal swelling, oropharyngeal exudate, posterior oropharyngeal erythema or uvula swelling.   Eyes:      General:         Right eye: No discharge.         Left eye: No discharge.      Conjunctiva/sclera: Conjunctivae normal.   Cardiovascular:      Rate and Rhythm: Normal rate and regular rhythm.      Heart sounds: Normal heart sounds. No murmur heard.  Pulmonary:      Effort: Pulmonary effort is normal. No respiratory distress.      Breath sounds: Normal " breath sounds and air entry. No stridor, decreased air movement or transmitted upper airway sounds. No decreased breath sounds, wheezing, rhonchi or rales.   Musculoskeletal:      Cervical back: Normal range of motion.   Skin:     General: Skin is warm.   Neurological:      Mental Status: She is alert and oriented to person, place, and time.   Psychiatric:         Mood and Affect: Mood normal.         Behavior: Behavior normal.         WORKUP: ACT 23      ASSESSMENT/PLAN:    Moderate persistent asthma without complication  Historical diagnosis of asthma, considering onset of symptoms as a child.  COPD overlap asthma is possible considering 20-year history of smoking,1/2 pack per day.  Currently symptoms are well controlled with Symbicort 160/4.5 mcg 2 puffs twice daily, montelukast 10 mg by mouth once daily at bedtime, and albuterol as needed.  -Continue as is.  Depending on future symptom control, consider omalizumab.  I provided her information about this asthma biologic.  -Ordered interval PFT.    - SYMBICORT 160-4.5 MCG/ACT Inhaler  Dispense: 10.2 g; Refill: 0  - montelukast (SINGULAIR) 10 MG tablet  Dispense: 30 tablet; Refill: 11  - albuterol (PROAIR HFA/PROVENTIL HFA/VENTOLIN HFA) 108 (90 Base) MCG/ACT inhaler  Dispense: 18 g; Refill: 3  - Nebulizer and Supplies Order for DME - ONLY FOR DME  - albuterol (PROVENTIL) (2.5 MG/3ML) 0.083% neb solution  Dispense: 120 mL; Refill: 1  - General PFT Lab (Please always keep checked)  - Pulmonary Function Test    Allergic rhinitis caused by mold  Allergic rhinitis due to animals  Seasonal allergic rhinitis due to pollen    Currently asymptomatic.  -Recommended to start intranasal fluticasone if symptoms become persistent.  - fluticasone (FLONASE) 50 MCG/ACT nasal spray  Dispense: 16 g; Refill: 4    Food allergy  Refilled epinephrine autoinjector.    - EPINEPHrine (AUVI-Q) 0.3 MG/0.3ML injection 2-pack  Dispense: 2 each; Refill: 1       Return in about 6 months (around  10/12/2022), or if symptoms worsen or fail to improve.    Thank you for allowing us to participate in the care of this patient. Please feel free to contact us if there are any questions or concerns about the patient.    Disclaimer: This note consists of symbols derived from keyboarding, dictation and/or voice recognition software. As a result, there may be errors in the script that have gone undetected. Please consider this when interpreting information found in this chart.    Arturo Patel MD, FAAAAI, FACAAI  Allergy, Asthma and Immunology     [facility]       Again, thank you for allowing me to participate in the care of your patient.        Sincerely,        Arturo Patel MD

## 2022-04-12 NOTE — PROGRESS NOTES
SUBJECTIVE:                                                                   Heather Wilkinson presents today to our Allergy Clinic at [facility] for a follow-up visit. She is a 66-year-old female with a history of asthma, food allergy, and allergic rhinitis.  Had IgE mediated symptoms with avocado, banana, and latex, manifested by throat closing, lip angioedema, and hives. Required epi in the past. In the past, positive serum IgE for avocado and latex.  History of allergic rhinoconjunctivitis symptoms. Was on allergen immunotherapy in the past, many years ago. Stopped immunotherapy secondary to systemic reactions.  In 2020, serum IgE for regional aeroallergen panel showed sensitivity to molds, cat, dog, grass pollen, and weed pollen.     She didn't need any meds last Winter. She doesn't use intranasal fluticasone and doesn't take loratadine.     History of moderate persistent asthma for years. Was hospitalized as a child. Stayed in ICU but no history of intubations for asthma flares. She smoked 1/2ppd for 20 years. Stopped in her 50's Has a history of recurrent thrush with ICS-containing medications. Her current regimen is Symbicort 160/4.5 mcg 2 puffs twice daily and  montelukast 10 mg by mouth at bedtime. She tried Spiriva for 1 month and stopped it because she didn't feel it was helpful. Current triggers: dog, cats, cold air, humidity, smoke, viral respiratory infections.   In 2020, serum IgE was 330 KiU/L (0-114).  CBC with differential was within normal limits. Absolute eosinophil count was 300. Spirometry in March 2020 suggested mild obstruction without postbronchodilator reversibility.   Heather is using albuterol HFA  less than twice per week for rescue from chest symptoms. She is waking up less than twice per month due to chest symptoms. There has been no use of oral steroids since the last visit. No ED/PCP/urgent care/other specialist visits for asthma flare since the previous  visit. The patient denies current chest tightness, cough, wheeze, or shortness of breath.   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     Allergic rhinitis due to mold     Allergic rhinitis due to animal dander     Thrush     Latex allergy     H/O: hysterectomy       Past Medical History:   Diagnosis Date     Allergic rhinitis      Asthma      Atrial fibrillation (H)      Cough      Fluid retention      Tachycardia       Problem (# of Occurrences) Relation (Name,Age of Onset)    No Known Problems (9) Mother, Father, Sister, Brother, Maternal Grandmother, Maternal Grandfather, Paternal Grandmother, Paternal Grandfather, Other       Negative family history of: Glaucoma, Macular Degeneration        Past Surgical History:   Procedure Laterality Date     CYSTOSCOPY  5/11/2012    Procedure:CYSTOSCOPY; Surgeon:RADHA COMER; Location:[facility]     GYN SURGERY      csection, infertility, hyst     HYSTERECTOMY  1992     HYSTERECTOMY, PAP NO LONGER INDICATED       SLING TRANSVAGINAL  5/11/2012    Procedure:SLING TRANSVAGINAL; mini arc sling  and cystoscopy; Surgeon:RADHA COMER; Location:[facility]     Social History     Socioeconomic History     Marital status:      Spouse name: None     Number of children: 4     Years of education: None     Highest education level: None   Occupational History     Comment: retired   Tobacco Use     Smoking status: Former Smoker     Smokeless tobacco: Never Used   Vaping Use     Vaping Use: Never used   Substance and Sexual Activity     Alcohol use: Yes     Drug use: No     Sexual activity: Yes     Partners: Male     Birth control/protection: None, Post-menopausal, Female Surgical     Comment: [facility]   Social History Narrative    ENVIRONMENTAL HISTORY: [place] The home is heated with  a forced air. They do have central air conditioning. The patient's bedroom is furnished with carpeting in bedroom.  Pets inside the house include 1 dog(s). There is no history of cockroach or mice infestation. There is/are 0 smokers in the house.  The house does not have a damp basement.               Review of Systems   Constitutional: Negative for activity change, chills and fever.   HENT: Positive for sneezing. Negative for congestion, ear discharge, facial swelling, nosebleeds, postnasal drip, rhinorrhea and sinus pressure.    Eyes: Positive for discharge and itching. Negative for redness.   Respiratory: Positive for cough, shortness of breath and wheezing. Negative for chest tightness.    Cardiovascular: Negative for chest pain.   Gastrointestinal: Negative for abdominal pain, diarrhea, nausea and vomiting.   Musculoskeletal: Negative for arthralgias and myalgias.   Skin: Negative for rash.   Allergic/Immunologic: Positive for environmental allergies.   Neurological: Negative for headaches.   Hematological: Negative for adenopathy.   Psychiatric/Behavioral: Negative for behavioral problems and self-injury.           Current Outpatient Medications:      acyclovir (ZOVIRAX) 400 MG tablet, Take 1 tablet (400 mg) by mouth 2 times daily Please keep appt 2/19/21, Disp: 60 tablet, Rfl: 11     albuterol (PROAIR HFA/PROVENTIL HFA/VENTOLIN HFA) 108 (90 Base) MCG/ACT inhaler, Inhale 2 puffs into the lungs every 4 hours as needed for shortness of breath / dyspnea or wheezing, Disp: 18 g, Rfl: 3     albuterol (PROVENTIL) (2.5 MG/3ML) 0.083% neb solution, Take 1 vial (2.5 mg) by nebulization every 4 hours as needed for shortness of breath / dyspnea or wheezing, Disp: 120 mL, Rfl: 1     Cholecalciferol (VITAMIN D-3 PO), Take 1 tablet by mouth, Disp: , Rfl:      CLARITIN-D 24 HOUR  MG per 24 hr tablet, TK 1 T PO  D, Disp: , Rfl: 0     EPINEPHrine (AUVI-Q) 0.3 MG/0.3ML injection 2-pack, Inject 0.3 mLs (0.3 mg) into the  muscle as needed for anaphylaxis, Disp: 2 each, Rfl: 1     erythromycin (ROMYCIN) 5 MG/GM ophthalmic ointment, 1 application to each eye at bedtime, Disp: 3.5 g, Rfl: 11     estradiol (HARVEY) 0.025 MG/24HR bi-weekly patch, APPLY 1 PATCH TO THE SKIN TWICE A WEEK, Disp: 24 patch, Rfl: 3     fluticasone (FLONASE) 50 MCG/ACT nasal spray, Spray 2 sprays into both nostrils daily, Disp: 16 g, Rfl: 4     Hypromellose (ARTIFICIAL TEARS OP), Apply 1 drop to eye as needed Usually every 30 minutes., Disp: , Rfl:      metoprolol tartrate (LOPRESSOR) 25 MG tablet, , Disp: , Rfl:      montelukast (SINGULAIR) 10 MG tablet, Take 1 tablet (10 mg) by mouth At Bedtime, Disp: 30 tablet, Rfl: 11     order for DME, Equipment being ordered: Nebulizer, Disp: 1 Box, Rfl: 0     SYMBICORT 160-4.5 MCG/ACT Inhaler, Inhale 2 puffs into the lungs 2 times daily, Disp: 10.2 g, Rfl: 0     albuterol (ACCUNEB) 1.25 MG/3ML neb solution, Take 2 vials (2.5 mg) by nebulization every 4 hours as needed for shortness of breath / dyspnea or wheezing (Patient not taking: Reported on 4/12/2022), Disp: 30 vial, Rfl: 4     dexamethasone (DECADRON) 0.1 % ophthalmic solution, Place 1 drop into the right eye every other day (Patient not taking: Reported on 4/12/2022), Disp: 5 mL, Rfl: 6     glucagon 1 MG kit, Inject 1mg as needed for anaphylaxis that is non-responding to epinephrine. (Patient not taking: Reported on 4/12/2022), Disp: 1 mg, Rfl: 0     phenazopyridine (PYRIDIUM) 100 MG tablet, Take 2 tablets (200 mg) by mouth 3 times daily as needed (Patient not taking: Reported on 4/12/2022), Disp: 20 tablet, Rfl: 1     triamterene-HCTZ (DYAZIDE) 37.5-25 MG capsule, TAKE TWO CAPSULES BY MOUTH EVERY DAY AS NEEDED FOR FLUID RETENTION (Patient not taking: Reported on 4/12/2022), Disp: 180 capsule, Rfl: 1  Immunization History   Administered Date(s) Administered     COVID-19,PF,Pfizer (12+ Yrs) 03/05/2021, 03/26/2021     DTaP, Unspecified 09/05/2014     Pneumococcal 23  "valent 10/16/2013     TD (ADULT, 7+) 04/01/2003     Allergies   Allergen Reactions     Latex Anaphylaxis     Xray Dye  [Contrast Dye] Hives     Pt states she had allergic reaction to xray day several years ago (hives) Pt has been successfully premedicated for contrast allergy without problem.      Sulfa Drugs      Penicillins Rash     Other reaction(s): Swelling, lips/tongue  Childhood rash     OBJECTIVE:                                                                 BP 98/63   Pulse 64   Ht 1.702 m (5' 7\")   Wt 68.9 kg (152 lb)   SpO2 97%   BMI 23.81 kg/m          Physical Exam  Vitals and nursing note reviewed.   Constitutional:       General: She is not in acute distress.     Appearance: She is not ill-appearing, toxic-appearing or diaphoretic.   HENT:      Head: Normocephalic and atraumatic.      Right Ear: Tympanic membrane, ear canal and external ear normal.      Left Ear: Tympanic membrane, ear canal and external ear normal.      Nose: No mucosal edema, congestion or rhinorrhea.      Right Turbinates: Not enlarged, swollen or pale.      Left Turbinates: Not enlarged, swollen or pale.      Mouth/Throat:      Lips: Pink.      Mouth: Mucous membranes are moist.      Pharynx: Oropharynx is clear. No pharyngeal swelling, oropharyngeal exudate, posterior oropharyngeal erythema or uvula swelling.   Eyes:      General:         Right eye: No discharge.         Left eye: No discharge.      Conjunctiva/sclera: Conjunctivae normal.   Cardiovascular:      Rate and Rhythm: Normal rate and regular rhythm.      Heart sounds: Normal heart sounds. No murmur heard.  Pulmonary:      Effort: Pulmonary effort is normal. No respiratory distress.      Breath sounds: Normal breath sounds and air entry. No stridor, decreased air movement or transmitted upper airway sounds. No decreased breath sounds, wheezing, rhonchi or rales.   Musculoskeletal:      Cervical back: Normal range of motion.   Skin:     General: Skin is warm. "   Neurological:      Mental Status: She is alert and oriented to person, place, and time.   Psychiatric:         Mood and Affect: Mood normal.         Behavior: Behavior normal.         WORKUP: ACT 23      ASSESSMENT/PLAN:    Moderate persistent asthma without complication  Historical diagnosis of asthma, considering onset of symptoms as a child.  COPD overlap asthma is possible considering 20-year history of smoking,1/2 pack per day.  Currently symptoms are well controlled with Symbicort 160/4.5 mcg 2 puffs twice daily, montelukast 10 mg by mouth once daily at bedtime, and albuterol as needed.  -Continue as is.  Depending on future symptom control, consider omalizumab.  I provided her information about this asthma biologic.  -Ordered interval PFT.    - SYMBICORT 160-4.5 MCG/ACT Inhaler  Dispense: 10.2 g; Refill: 0  - montelukast (SINGULAIR) 10 MG tablet  Dispense: 30 tablet; Refill: 11  - albuterol (PROAIR HFA/PROVENTIL HFA/VENTOLIN HFA) 108 (90 Base) MCG/ACT inhaler  Dispense: 18 g; Refill: 3  - Nebulizer and Supplies Order for DME - ONLY FOR DME  - albuterol (PROVENTIL) (2.5 MG/3ML) 0.083% neb solution  Dispense: 120 mL; Refill: 1  - General PFT Lab (Please always keep checked)  - Pulmonary Function Test    Allergic rhinitis caused by mold  Allergic rhinitis due to animals  Seasonal allergic rhinitis due to pollen    Currently asymptomatic.  -Recommended to start intranasal fluticasone if symptoms become persistent.  - fluticasone (FLONASE) 50 MCG/ACT nasal spray  Dispense: 16 g; Refill: 4    Food allergy  Refilled epinephrine autoinjector.    - EPINEPHrine (AUVI-Q) 0.3 MG/0.3ML injection 2-pack  Dispense: 2 each; Refill: 1       Return in about 6 months (around 10/12/2022), or if symptoms worsen or fail to improve.    Thank you for allowing us to participate in the care of this patient. Please feel free to contact us if there are any questions or concerns about the patient.    Disclaimer: This note consists of  symbols derived from keyboarding, dictation and/or voice recognition software. As a result, there may be errors in the script that have gone undetected. Please consider this when interpreting information found in this chart.    Arturo Patel MD, FAAAAI, FACAAI  Allergy, Asthma and Immunology     [facility]

## 2022-04-12 NOTE — PATIENT INSTRUCTIONS
Continue current medication therapy.     Schedule the breathing test.        Allergy Staff Appt Hours Shot Hours Locations    Physician     Arturo Patel MD       Support Staff     MAURY Crandall CMA  Tuesday:   [facility] 7-5     Wednesday:  [facility] :         [place]  [facility]        Tuesday: : : : : [facility]       Tues & Wed: :       Mon & Thurs:        Fri:          [facility]  [address] [place] [address]  Appt Line: [phone]    [facility]  [address] [place] [address]  Appt Line: [phone]    Pulmonary Function Scheduling:  [place]: [phone]  [place]: [phone]  [phone]     Prescription Assistance    If you need assistance with your prescriptions (cost, coverage, etc) please contact: [facility] [phone]    Important Scheduling Information    Appointments for skin testing: Appointment will last approximately 45 minutes.  Please call the appointment line for your clinic to schedule.  Discontinue oral antihistamines 7 days prior to the appointment.  Discontinue nasal and ocular antihistamines 4 days prior to appointment.    Appointments for challenges (oral food challenge, penicillin testing, aspirin desensitization) If your provider instructs you to that this additional testing is indicated, it will need to be scheduled directly through the allergy department.  Please contact them via [place] or by calling the clinic and asking to speak with the allergy team.  They will provide additional information and instructions for the appointment.  Discontinue oral antihistamines 7 days prior to the appointment.  Discontinue nasal and ocular antihistamines 4 days prior to the appointment.    Thank you for trusting us with your care.  Please feel free to contact us with any questions or concerns you may have.

## 2022-05-04 ENCOUNTER — TELEPHONE (OUTPATIENT)
Dept: ALLERGY | Facility: OTHER | Age: 67
End: 2022-05-04
Payer: COMMERCIAL

## 2022-05-04 DIAGNOSIS — B37.0 THRUSH: Primary | ICD-10-CM

## 2022-05-04 RX ORDER — CLOTRIMAZOLE 10 MG/1
10 LOZENGE ORAL
Qty: 70 LOZENGE | Refills: 0 | Status: SHIPPED | OUTPATIENT
Start: 2022-05-04 | End: 2022-05-04

## 2022-05-04 RX ORDER — FLUCONAZOLE 100 MG/1
TABLET ORAL
Qty: 8 TABLET | Refills: 0 | Status: SHIPPED | OUTPATIENT
Start: 2022-05-04 | End: 2022-12-12

## 2022-05-04 NOTE — TELEPHONE ENCOUNTER
Provider changed rx to fluconazole.  Patient notified.  She will contact us if infection doesn't clear up.    Kerry Condon RN

## 2022-05-04 NOTE — TELEPHONE ENCOUNTER
Fax received from [facility]'s requesting refill of fluconazole 100 mg tablets.     Not on active med list.

## 2022-05-04 NOTE — TELEPHONE ENCOUNTER
Spoke with patient.  She started developing symptoms of thrush (white patches on inside of mouth and red, ulcer like sore on lower lip) about three days ago.  She does rinse/spit/gargle after her Symbicort.  Requesting medication to treat.  She has used both the nystatin swish and fluconazole tablets in past with good results.  Pharmacy is [facility] in [place].  Thank you.    Kerry Condon RN

## 2022-05-11 ENCOUNTER — TELEPHONE (OUTPATIENT)
Dept: FAMILY MEDICINE | Facility: OTHER | Age: 67
End: 2022-05-11
Payer: COMMERCIAL

## 2022-05-11 DIAGNOSIS — J45.40 MODERATE PERSISTENT ASTHMA WITHOUT COMPLICATION: Primary | ICD-10-CM

## 2022-05-11 RX ORDER — AZITHROMYCIN 250 MG/1
TABLET, FILM COATED ORAL
Qty: 6 TABLET | Refills: 0 | Status: SHIPPED | OUTPATIENT
Start: 2022-05-11 | End: 2022-05-16

## 2022-05-11 NOTE — TELEPHONE ENCOUNTER
Spoke to patient, let her know the plan, she is in agreement and will be in touch if not improving.    Eileen Mendez RN

## 2022-05-11 NOTE — TELEPHONE ENCOUNTER
Spoke with patient.  She was sick with a cold that started about two weeks ago, and has had continued symptoms along with allergy symptoms.  States has facial pain/pressure, congestion with yellow/green mucus from nose.     In the last 5 days she has had increased asthma symptoms also.  Patient states she is coughing (dry), wheezing, shortness of breath.  Worsening at night.  She is using symbicort 2 puffs BID, montelukast, Flonase, Claritin and albuterol prn.  Using albuterol 3-4 puffs or albuterol nebulizer every 4 hours.  Relief lasts about 2 hours.  She also started prednisone that she had on hand on Friday (taking 40mg/day).  She states this has helped, but feels she is still flaring and needs a longer course.  She's also concerned because all the albuterol is giving her tachycardia.    Pharmacy is [facility] in [place].  Forwarding to Dr Patel to advise, thank you.    MAURY Arellano / MAURY Payne

## 2022-05-11 NOTE — TELEPHONE ENCOUNTER
I prefer not to prescribe prednisone without knowing what is the reason for the refill.  Sometimes, I will prescribe by hearing the story.  Sometimes, may need to see the patient in the office.  Considering facial pressure and pain, congestion with yellow/green mucus, I would recommend to start azithromycin and see how she feels.  If she continues having symptoms, I will prescribe her prednisone as well.  Please let me know over the patient is fine with the plan.      Arturo Patel MD

## 2022-05-11 NOTE — TELEPHONE ENCOUNTER
"Pt having a hard time with her asthma with the allergens in the pair right now and having a cold on top of it. She took a round of prednisone, which helps but when they start wearing off her symptoms come back. She has one dose left and is worried that she will be \"in trouble\" if she doesn't get something to help. She is hoping for another round of prednisone. She had that from dr. collado to keep on hand from awhile ago.     "

## 2022-05-24 DIAGNOSIS — N95.1 SYMPTOMATIC MENOPAUSAL OR FEMALE CLIMACTERIC STATES: ICD-10-CM

## 2022-05-25 ENCOUNTER — MYC REFILL (OUTPATIENT)
Dept: ALLERGY | Facility: OTHER | Age: 67
End: 2022-05-25
Payer: COMMERCIAL

## 2022-05-25 DIAGNOSIS — J45.40 MODERATE PERSISTENT ASTHMA WITHOUT COMPLICATION: ICD-10-CM

## 2022-05-25 RX ORDER — BUDESONIDE AND FORMOTEROL FUMARATE DIHYDRATE 160; 4.5 UG/1; UG/1
2 AEROSOL RESPIRATORY (INHALATION) 2 TIMES DAILY
Qty: 10.2 G | Refills: 5 | Status: SHIPPED | OUTPATIENT
Start: 2022-05-25 | End: 2023-02-01

## 2022-05-25 RX ORDER — ESTRADIOL 0.03 MG/D
PATCH, EXTENDED RELEASE TRANSDERMAL
Qty: 24 PATCH | Refills: 2 | OUTPATIENT
Start: 2022-05-25

## 2022-05-25 NOTE — TELEPHONE ENCOUNTER
Prescription approved per [facility] Refill Protocol.    LOV:  4/12/2022, Recommended follow-up on 10/12/202    Irene UMAÑA RN  Specialty/Allergy Clinic

## 2022-05-25 NOTE — TELEPHONE ENCOUNTER
"Requested Prescriptions   Pending Prescriptions Disp Refills     HARVEY 0.025 MG/24HR bi-weekly patch [Pharmacy Med Name: HARVEY 0.025MG/24HR PTTW] 24 patch 2     Sig: APPLY 1 PATCH TO THE SKIN TWICE A WEEK       Hormone Replacement Therapy Passed - 5/24/2022 11:12 PM        Passed - Blood pressure under 140/90 in past 12 months     BP Readings from Last 3 Encounters:   04/12/22 98/63   03/24/22 124/82   07/07/21 103/65                 Passed - Recent (12 mo) or future (30 days) visit within the authorizing provider's specialty     Patient has had an office visit with the authorizing provider or a provider within the authorizing providers department within the previous 12 mos or has a future within next 30 days. See \"Patient Info\" tab in inbasket, or \"Choose Columns\" in Meds & Orders section of the refill encounter.              Passed - Patient has mammogram in past 2 years on file if age 50-75        Passed - Medication is active on med list        Passed - Patient is 18 years of age or older        Passed - No active pregnancy on record        Passed - No positive pregnancy test on record in past 12 months         Last Written Prescription Date:  3/24/22  Last Fill Quantity: 24,  # refills: 3   Last office visit: 3/24/2022 with prescribing provider:  pamela   Future Office Visit:       Refill request refused due to:  [x]Refills available at pharmacy. Request sent back to pharmacy as duplicate.  []Patient reports no longer needing medication.  []Medication discontinued.   Jonathon King RN on 5/25/2022 at 8:20 AM    "

## 2022-09-03 ENCOUNTER — HEALTH MAINTENANCE LETTER (OUTPATIENT)
Age: 67
End: 2022-09-03

## 2022-10-12 DIAGNOSIS — R60.9 EDEMA, UNSPECIFIED TYPE: ICD-10-CM

## 2022-10-12 RX ORDER — TRIAMTERENE AND HYDROCHLOROTHIAZIDE 37.5; 25 MG/1; MG/1
CAPSULE ORAL
Qty: 180 CAPSULE | Refills: 1 | Status: SHIPPED | OUTPATIENT
Start: 2022-10-12 | End: 2023-04-12

## 2022-10-12 NOTE — TELEPHONE ENCOUNTER
"Requested Prescriptions   Pending Prescriptions Disp Refills     triamterene-HCTZ (DYAZIDE) 37.5-25 MG capsule [Pharmacy Med Name: TRIAMTERENE-HCTZ 37.5-25MG CAPS] 180 capsule 1     Sig: TAKE TWO CAPSULES BY MOUTH EVERY DAY AS NEEDED FOR FLUID RETENION       Diuretics (Including Combos) Protocol Failed - 10/12/2022  6:34 AM        Failed - Normal serum creatinine on file in past 12 months     No lab results found.           Failed - Normal serum potassium on file in past 12 months     No lab results found.                 Failed - Normal serum sodium on file in past 12 months     No lab results found.           Passed - Blood pressure under 140/90 in past 12 months     BP Readings from Last 3 Encounters:   04/12/22 98/63   03/24/22 124/82   07/07/21 103/65                 Passed - Recent (12 mo) or future (30 days) visit within the authorizing provider's specialty     Patient has had an office visit with the authorizing provider or a provider within the authorizing providers department within the previous 12 mos or has a future within next 30 days. See \"Patient Info\" tab in inbasket, or \"Choose Columns\" in Meds & Orders section of the refill encounter.              Passed - Medication is active on med list        Passed - Patient is age 18 or older        Passed - No active pregancy on record        Passed - No positive pregnancy test in past 12 months           Last Written Prescription Date:  3/24/22  Last Fill Quantity: 180,  # refills: 1   Last office visit: 3/24/2022 with prescribing provider:  LAINA Simmons NP   Future Office Visit:    Normal BMP through [facility] documented 2/18/2022    Prescription approved per [facility] Refill Protocol.  Kerry Dewitt RN on 10/12/2022 at 8:16 AM        "

## 2023-01-06 DIAGNOSIS — J45.40 MODERATE PERSISTENT ASTHMA WITHOUT COMPLICATION: ICD-10-CM

## 2023-01-09 ENCOUNTER — MYC MEDICAL ADVICE (OUTPATIENT)
Dept: ALLERGY | Facility: CLINIC | Age: 68
End: 2023-01-09

## 2023-01-09 RX ORDER — ALBUTEROL SULFATE 90 UG/1
2 AEROSOL, METERED RESPIRATORY (INHALATION) EVERY 4 HOURS PRN
Qty: 18 G | Refills: 2 | Status: SHIPPED | OUTPATIENT
Start: 2023-01-09 | End: 2023-06-06

## 2023-01-09 NOTE — TELEPHONE ENCOUNTER
Pending Prescriptions:                       Disp   Refills    albuterol (PROAIR HFA/PROVENTIL HFA/NJ*18 g   3            Sig: Inhale 2 puffs into the lungs every 4 hours as           needed for shortness of breath or wheezing     Routing refill request to provider for review/approval because:  Patient needs to be seen because:  LOV 4/12/22.  I did help her schedule with you for 3/7/23.  She is requesting refill until then.    Kerry Condon RN

## 2023-02-01 DIAGNOSIS — J45.40 MODERATE PERSISTENT ASTHMA WITHOUT COMPLICATION: ICD-10-CM

## 2023-02-01 RX ORDER — BUDESONIDE AND FORMOTEROL FUMARATE DIHYDRATE 160; 4.5 UG/1; UG/1
2 AEROSOL RESPIRATORY (INHALATION) 2 TIMES DAILY
Qty: 10.2 G | Refills: 5 | Status: SHIPPED | OUTPATIENT
Start: 2023-02-01 | End: 2023-03-07

## 2023-02-01 NOTE — TELEPHONE ENCOUNTER
Pending Prescriptions:                       Disp   Refills    SYMBICORT 160-4.5 MCG/ACT Inhaler         10.2 g 5            Sig: Inhale 2 puffs into the lungs 2 times daily    Routing refill request to provider for review/approval because:  Patient needs to be seen because:  LOV 4/12/22.  Patient is scheduled to see Dr Patel on 3/7/23.    Kerry Condon RN

## 2023-03-07 ENCOUNTER — OFFICE VISIT (OUTPATIENT)
Dept: ALLERGY | Facility: OTHER | Age: 68
End: 2023-03-07
Payer: COMMERCIAL

## 2023-03-07 VITALS
HEART RATE: 65 BPM | WEIGHT: 156.53 LBS | SYSTOLIC BLOOD PRESSURE: 121 MMHG | HEIGHT: 67 IN | BODY MASS INDEX: 24.57 KG/M2 | OXYGEN SATURATION: 100 % | DIASTOLIC BLOOD PRESSURE: 78 MMHG

## 2023-03-07 DIAGNOSIS — J30.89 ALLERGIC RHINITIS DUE TO MOLD: ICD-10-CM

## 2023-03-07 DIAGNOSIS — J30.81 ALLERGIC RHINITIS DUE TO ANIMAL DANDER: ICD-10-CM

## 2023-03-07 DIAGNOSIS — J30.1 SEASONAL ALLERGIC RHINITIS DUE TO POLLEN: ICD-10-CM

## 2023-03-07 DIAGNOSIS — J45.40 MODERATE PERSISTENT ASTHMA WITHOUT COMPLICATION: Primary | ICD-10-CM

## 2023-03-07 DIAGNOSIS — T63.481A ALLERGIC REACTION TO HYMENOPTERA VENOM: ICD-10-CM

## 2023-03-07 DIAGNOSIS — R21 RASH: ICD-10-CM

## 2023-03-07 LAB
FEF 25/75: NORMAL
FEV-1: NORMAL
FEV1/FVC: NORMAL
FVC: NORMAL

## 2023-03-07 PROCEDURE — 99214 OFFICE O/P EST MOD 30 MIN: CPT | Mod: 25 | Performed by: ALLERGY & IMMUNOLOGY

## 2023-03-07 PROCEDURE — 94060 EVALUATION OF WHEEZING: CPT | Performed by: ALLERGY & IMMUNOLOGY

## 2023-03-07 RX ORDER — ALBUTEROL SULFATE 0.83 MG/ML
2.5 SOLUTION RESPIRATORY (INHALATION) ONCE
Status: COMPLETED | OUTPATIENT
Start: 2023-03-07 | End: 2023-03-07

## 2023-03-07 RX ORDER — BUDESONIDE AND FORMOTEROL FUMARATE DIHYDRATE 160; 4.5 UG/1; UG/1
2 AEROSOL RESPIRATORY (INHALATION) 2 TIMES DAILY
Qty: 10.2 G | Refills: 5 | Status: SHIPPED | OUTPATIENT
Start: 2023-03-07 | End: 2023-03-07

## 2023-03-07 RX ORDER — MONTELUKAST SODIUM 10 MG/1
10 TABLET ORAL AT BEDTIME
Qty: 30 TABLET | Refills: 11 | Status: SHIPPED | OUTPATIENT
Start: 2023-03-07 | End: 2024-02-13

## 2023-03-07 RX ORDER — APIXABAN 5 MG/1
1 TABLET, FILM COATED ORAL
COMMUNITY
Start: 2023-02-14

## 2023-03-07 RX ORDER — BUDESONIDE AND FORMOTEROL FUMARATE DIHYDRATE 160; 4.5 UG/1; UG/1
2 AEROSOL RESPIRATORY (INHALATION) 2 TIMES DAILY
Qty: 10.2 G | Refills: 5 | Status: SHIPPED | OUTPATIENT
Start: 2023-03-07 | End: 2023-06-06

## 2023-03-07 RX ADMIN — ALBUTEROL SULFATE 2.5 MG: 0.83 SOLUTION RESPIRATORY (INHALATION) at 09:36

## 2023-03-07 ASSESSMENT — ASTHMA QUESTIONNAIRES
QUESTION_1 LAST FOUR WEEKS HOW MUCH OF THE TIME DID YOUR ASTHMA KEEP YOU FROM GETTING AS MUCH DONE AT WORK, SCHOOL OR AT HOME: NONE OF THE TIME
QUESTION_3 LAST FOUR WEEKS HOW OFTEN DID YOUR ASTHMA SYMPTOMS (WHEEZING, COUGHING, SHORTNESS OF BREATH, CHEST TIGHTNESS OR PAIN) WAKE YOU UP AT NIGHT OR EARLIER THAN USUAL IN THE MORNING: NOT AT ALL
ACT_TOTALSCORE: 24
QUESTION_4 LAST FOUR WEEKS HOW OFTEN HAVE YOU USED YOUR RESCUE INHALER OR NEBULIZER MEDICATION (SUCH AS ALBUTEROL): NOT AT ALL
QUESTION_2 LAST FOUR WEEKS HOW OFTEN HAVE YOU HAD SHORTNESS OF BREATH: NOT AT ALL
QUESTION_5 LAST FOUR WEEKS HOW WOULD YOU RATE YOUR ASTHMA CONTROL: WELL CONTROLLED
ACT_TOTALSCORE: 24

## 2023-03-07 ASSESSMENT — ENCOUNTER SYMPTOMS
ARTHRALGIAS: 0
SINUS PRESSURE: 0
NAUSEA: 0
VOMITING: 0
HEADACHES: 0
EYE ITCHING: 0
EYE REDNESS: 0
SHORTNESS OF BREATH: 0
MYALGIAS: 0
WHEEZING: 0
CHEST TIGHTNESS: 0
RHINORRHEA: 0
ADENOPATHY: 0
FEVER: 0
DIARRHEA: 0
EYE DISCHARGE: 0
ACTIVITY CHANGE: 0
COUGH: 0
CHILLS: 0

## 2023-03-07 NOTE — PROGRESS NOTES
SUBJECTIVE:                                                                   Heather Wilkinson presents today to our Allergy Clinic at [facility] for a follow up visit. She is a 67 year old female with history of asthma, food allergy, and allergic rhinitis.  Problem   Moderate Persistent Asthma Without Complication    History of moderate persistent asthma for years. Was hospitalized as a child. Stayed in ICU but no history of intubations for asthma flares. She smoked 1/2ppd for 20 years. Stopped in her 50's Has a history of recurrent thrush with ICS-containing medications.  In 2020, serum IgE was 330 KiU/L (0-114).  CBC with differential was within normal limits. Absolute eosinophil count was 300. Spirometry in March 2020 suggested mild obstruction without postbronchodilator reversibility.     Food Allergy    Had IgE mediated symptoms with avocado, banana, and latex, manifested by throat closing, lip angioedema, and hives. Required epi in the past. In the past, positive serum IgE for avocado and latex.     Seasonal Allergic Rhinitis Due to Pollen    History of allergic rhinoconjunctivitis symptoms. Was on allergen immunotherapy in the past, many years ago. Stopped immunotherapy secondary to systemic reactions.  In 2020, serum IgE for regional aeroallergen panel showed sensitivity to molds, cat, dog, grass pollen, and weed pollen.        Emma reports that her current asthma symptoms are well controlled with Symbicort 160/4.5 mcg 2 puffs once daily, montelukast 10 mg by mouth once daily at bedtime, and albuterol as needed.  She had COVID-19 infection in December 2022. She states that she ended up on prednisone for a couple of weeks. She elected not to use Paxlovid while being on Eliquis.  Matilde reports recovering since then. Heather uses albuterol HFA  less than twice weekly for rescue from chest symptoms. She wakes up less than twice per month due to chest symptoms. The patient denies  current chest tightness, cough, wheezing, or shortness of breath.      She reports good rhinitis control with diphenhydramine every night, and Claritin-D as needed in Spring, Summer, and Fall. She states that she did not need to use any nasal sprays seasonally.    Developed some rash at hairline within the past couple of months. It may last for weeks.          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     Allergic rhinitis due to mold     Allergic rhinitis due to animal dander     Thrush     Latex allergy     H/O: hysterectomy       Past Medical History:   Diagnosis Date     Allergic rhinitis      Asthma      Atrial fibrillation (H)      Cough      Fluid retention      Tachycardia       Problem (# of Occurrences) Relation (Name,Age of Onset)    No Known Problems (9) Mother, Father, Sister, Brother, Maternal Grandmother, Maternal Grandfather, Paternal Grandmother, Paternal Grandfather, Other       Negative family history of: Glaucoma, Macular Degeneration        Past Surgical History:   Procedure Laterality Date     CYSTOSCOPY  5/11/2012    Procedure:CYSTOSCOPY; Surgeon:RADHA COMER; Location:[facility]     GYN SURGERY      csection, infertility, hyst     HYSTERECTOMY  1992     HYSTERECTOMY, PAP NO LONGER INDICATED       SLING TRANSVAGINAL  5/11/2012    Procedure:SLING TRANSVAGINAL; mini arc sling  and cystoscopy; Surgeon:RADHA COMER; Location:[facility]     Social History     Socioeconomic History     Marital status:      Spouse name: None     Number of children: 4     Years of education: None     Highest education level: None   Occupational History     Comment: retired   Tobacco Use     Smoking status: Former     Smokeless tobacco: Never   Vaping Use     Vaping Use: Never used   Substance and Sexual Activity     Alcohol use: Yes     Drug use: No      Sexual activity: Yes     Partners: Male     Birth control/protection: None, Post-menopausal, Female Surgical     Comment: YULY   Social History Narrative    ENVIRONMENTAL HISTORY: [place] The home is heated with a forced air. They do have central air conditioning. The patient's bedroom is furnished with carpeting in bedroom.  Pets inside the house include 1 dog(s). There is no history of cockroach or mice infestation. There is/are 0 smokers in the house.  The house does not have a damp basement.               Review of Systems   Constitutional: Negative for activity change, chills and fever.   HENT: Negative for congestion, ear discharge, postnasal drip, rhinorrhea, sinus pressure and sneezing.    Eyes: Negative for discharge, redness and itching.   Respiratory: Negative for cough, chest tightness, shortness of breath and wheezing.    Cardiovascular: Negative for chest pain.   Gastrointestinal: Negative for diarrhea, nausea and vomiting.   Musculoskeletal: Negative for arthralgias and myalgias.   Skin: Positive for rash.   Allergic/Immunologic: Positive for environmental allergies.   Neurological: Negative for headaches.   Hematological: Negative for adenopathy.   Psychiatric/Behavioral: Negative for self-injury.           Current Outpatient Medications:      Cholecalciferol (VITAMIN D-3 PO), Take 1 tablet by mouth, Disp: , Rfl:      CLARITIN-D 24 HOUR  MG per 24 hr tablet, TK 1 T PO  D, Disp: , Rfl: 0     dexamethasone (DECADRON) 0.1 % ophthalmic solution, Place 1 drop into the right eye every other day, Disp: 5 mL, Rfl: 6     erythromycin (ROMYCIN) 5 MG/GM ophthalmic ointment, 1 application to each eye at bedtime, Disp: 3.5 g, Rfl: 2     estradiol (HARVEY) 0.025 MG/24HR bi-weekly patch, APPLY 1 PATCH TO THE SKIN TWICE A WEEK, Disp: 24 patch, Rfl: 3     fluconazole (DIFLUCAN) 100 MG tablet, 200 mg loading dose on day one, followed by 100mg by mouth daily for 7  days, Disp: 8 tablet, Rfl: 0     glucagon 1 MG kit, Inject 1mg as needed for anaphylaxis that is non-responding to epinephrine., Disp: 1 mg, Rfl: 0     Hypromellose (ARTIFICIAL TEARS OP), Apply 1 drop to eye as needed Usually every 30 minutes., Disp: , Rfl:      metoprolol tartrate (LOPRESSOR) 25 MG tablet, , Disp: , Rfl:      montelukast (SINGULAIR) 10 MG tablet, Take 1 tablet (10 mg) by mouth At Bedtime, Disp: 30 tablet, Rfl: 11     order for DME, Equipment being ordered: Nebulizer, Disp: 1 Box, Rfl: 0     SYMBICORT 160-4.5 MCG/ACT Inhaler, Inhale 2 puffs into the lungs 2 times daily, Disp: 10.2 g, Rfl: 5     triamterene-HCTZ (DYAZIDE) 37.5-25 MG capsule, TAKE TWO CAPSULES BY MOUTH EVERY DAY AS NEEDED FOR FLUID RETENION, Disp: 180 capsule, Rfl: 1     acyclovir (ZOVIRAX) 400 MG tablet, Take 1 tablet (400 mg) by mouth 2 times daily Please keep appt 2/19/21, Disp: 60 tablet, Rfl: 11     albuterol (ACCUNEB) 1.25 MG/3ML neb solution, Take 2 vials (2.5 mg) by nebulization every 4 hours as needed for shortness of breath / dyspnea or wheezing, Disp: 30 vial, Rfl: 4     albuterol (PROAIR HFA/PROVENTIL HFA/VENTOLIN HFA) 108 (90 Base) MCG/ACT inhaler, Inhale 2 puffs into the lungs every 4 hours as needed for shortness of breath or wheezing, Disp: 18 g, Rfl: 2     albuterol (PROVENTIL) (2.5 MG/3ML) 0.083% neb solution, Take 1 vial (2.5 mg) by nebulization every 4 hours as needed for shortness of breath / dyspnea or wheezing, Disp: 120 mL, Rfl: 1     ELIQUIS ANTICOAGULANT 5 MG tablet, Take 1 tablet by mouth 2 times daily, Disp: , Rfl:      EPINEPHrine (AUVI-Q) 0.3 MG/0.3ML injection 2-pack, Inject 0.3 mLs (0.3 mg) into the muscle as needed for anaphylaxis, Disp: 2 each, Rfl: 1     fluticasone (FLONASE) 50 MCG/ACT nasal spray, Spray 2 sprays into both nostrils daily, Disp: 16 g, Rfl: 4  No current facility-administered medications for this visit.  Immunization History   Administered Date(s) Administered     COVID-19 Vaccine 12+  "(Pfizer) 03/05/2021, 03/26/2021, 11/08/2021     DTaP, Unspecified 09/05/2014     Pneumococcal 23 valent 10/16/2013     TD (ADULT, 7+) 04/01/2003     Allergies   Allergen Reactions     Latex Anaphylaxis     Xray Dye  [Contrast Dye] Hives     Pt states she had allergic reaction to xray day several years ago (hives) Pt has been successfully premedicated for contrast allergy without problem.      Sulfa Drugs      Penicillins Rash     Other reaction(s): Swelling, lips/tongue  Childhood rash     OBJECTIVE:                                                                 /78   Pulse 65   Ht 1.702 m (5' 7\")   Wt 71 kg (156 lb 8.4 oz)   SpO2 100%   BMI 24.52 kg/m          Physical Exam  Vitals and nursing note reviewed.   Constitutional:       General: She is not in acute distress.     Appearance: She is not ill-appearing, toxic-appearing or diaphoretic.   HENT:      Head: Normocephalic and atraumatic.      Right Ear: Tympanic membrane, ear canal and external ear normal.      Left Ear: Tympanic membrane, ear canal and external ear normal.      Nose: No mucosal edema, congestion or rhinorrhea.      Right Turbinates: Not enlarged, swollen or pale.      Left Turbinates: Not enlarged, swollen or pale.      Mouth/Throat:      Lips: Pink.      Mouth: Mucous membranes are moist.      Pharynx: Oropharynx is clear. No pharyngeal swelling, oropharyngeal exudate, posterior oropharyngeal erythema or uvula swelling.   Eyes:      General:         Right eye: No discharge.         Left eye: No discharge.      Conjunctiva/sclera: Conjunctivae normal.   Cardiovascular:      Rate and Rhythm: Normal rate and regular rhythm.      Heart sounds: Normal heart sounds. No murmur heard.  Pulmonary:      Effort: Pulmonary effort is normal. No respiratory distress.      Breath sounds: Normal breath sounds and air entry. No stridor, decreased air movement or transmitted upper airway sounds. No decreased breath sounds, wheezing, rhonchi or " rales.   Musculoskeletal:      Cervical back: Normal range of motion.   Skin:     General: Skin is warm.      Comments: Hairline and on scalp, small, white, papular lesions.  Do not seem to be consistent with urticaria or dermatitis.   Neurological:      Mental Status: She is alert and oriented to person, place, and time.   Psychiatric:         Mood and Affect: Mood normal.         Behavior: Behavior normal.               WORKUP: Spirometry      SPIROMETRY  initial FVC 2.60L (75% of predicted); after bronchodilator 2.95L (+13% change)   initial FEV1 1.70L (64% of predicted); after bronchodilator 1.82L (+7% change)  initial FEV1/FVC 65%; after bronchodilator 61%    I have reviewed and interpreted these results.  The office spirometry performed today suggests mild to moderate obstruction. It is worse compared to 2020.  Postbronchodilator response in FVC noted.    Asthma Control Test (ACT) total score: 24      ASSESSMENT/PLAN:    Moderate persistent asthma without complication    While the patient reports that her symptoms well controlled, her spirometry is worse when compared to 2020.  There is a postbronchodilator response.  It suggests that she may benefit from additional asthma meds.  She uses Symbicort only once daily.  - Continue montelukast 10 mg by mouth once daily.  - Increase Symbicort to 2 puffs twice daily.  -Use albuterol inhaler 2-4 puffs every 4 hours as needed for chest tightness/wheezing/shortness of breath/persistent cough.   I will see the patient back in 3 months to reassess her asthma and spirometry.    - BRONCHODILATION RESPONSE, PRE/POST ADMIN  - montelukast (SINGULAIR) 10 MG tablet  Dispense: 30 tablet; Refill: 11  - SYMBICORT 160-4.5 MCG/ACT Inhaler  Dispense: 10.2 g; Refill: 5      Seasonal allergic rhinitis due to pollen  Allergic rhinitis due to animal dander  Allergic rhinitis due to mold  Currently, she denies any symptoms.  - Advised against using the first generation of H1  antihistamines.  I suggest stopping diphenhydramine consistently.  - Start either loratadine or cetirizine 10 mg by mouth once daily as needed.  I suggest not to rely on Claritin-D consistently.  If she develops persistent nasal symptoms, started intranasal fluticasone.    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  In 2020, serum IgE for venom was negative.  - Anticipate skin test with venoms.  We will discuss that in 3 months when she comes back for asthma follow-up.    Rash  Does not seem to be consistent with chronic dermatitis or eczema.  It does not appear urticarial.  - I recommend the patient to be evaluated by Dermatology.         Return in about 3 months (around 6/7/2023), or if symptoms worsen or fail to improve.    Thank you for allowing us to participate in the care of this patient. Please feel free to contact us if there are any questions or concerns about the patient.    Disclaimer: This note consists of symbols derived from keyboarding, dictation and/or voice recognition software. As a result, there may be errors in the script that have gone undetected. Please consider this when interpreting information found in this chart.    Arturo Patel MD, FAAAAI, FACAAI  Allergy, Asthma and Immunology     [facility]

## 2023-03-07 NOTE — NURSING NOTE
Clinic Administered Medication Documentation    Administrations This Visit     albuterol (PROVENTIL) neb solution 2.5 mg     Admin Date  03/07/2023 Action  $Given Dose  2.5 mg Route  Nebulization Site   Administered By  Mallika Boyer MA    Ordering Provider: Arturo Patel MD    NDC: 0723-2636-66    Lot#: [id number]    : MYLAN    Patient Supplied?: No                Mallika Boyer MA

## 2023-03-07 NOTE — PATIENT INSTRUCTIONS
Increase Symbicort to 2 puffs twice daily.  Make sure to rinse, gargle, and spit water after using it.   Continue montelukast (Singulair) 10 mg by mouth once daily at bedtime.     -Continue using albuterol inhaler 2-4 puffs every 4 hours as needed for chest tightness/wheezing/shortness of breath/persistent cough.    Instead of taking Benadryl every night, try either cetirizine 10 mg by mouth once daily as needed or loratadine 10 mg by mouth once daily as needed.   If you take Claritin-D just once in a blue moon, that is fine.  But I would not rely on Claritin-D as a long-term.   If you develop persistent nasal symptoms, start Flonase.     Once the lung function is better, consider venom testing.       Consider seeing a skin doctor(Dermatology).        Dr Patel Scheduling:  [facility] (Tues / Wed) appointment line: [phone]  [place] allergy shot room: [phone]  [facility] (Thurs / Fri) appointment line: [phone]    Pulmonary Function Scheduling:  [place] - [phone]  [facility] [phone]  [place] - [phone]     Prescription Assistance  If you need assistance with your prescriptions (cost, coverage, etc) please contact: [place] Pres[facility] Assistance Program [phone]

## 2023-03-07 NOTE — LETTER
3/7/2023         RE: Heather Wilkinson  [id number] [facility] [address]        Dear Colleague,    Thank you for referring your patient, Heather Wilkinson, to the [facility]. Please see a copy of my visit note below.    SUBJECTIVE:                                                                   Heather Wilkinson presents today to our Allergy Clinic at [facility] for a follow up visit. She is a 67 year old female with history of asthma, food allergy, and allergic rhinitis.  Problem   Moderate Persistent Asthma Without Complication    History of moderate persistent asthma for years. Was hospitalized as a child. Stayed in ICU but no history of intubations for asthma flares. She smoked 1/2ppd for 20 years. Stopped in her 50's Has a history of recurrent thrush with ICS-containing medications.  In 2020, serum IgE was 330 KiU/L (0-114).  CBC with differential was within normal limits. Absolute eosinophil count was 300. Spirometry in March 2020 suggested mild obstruction without postbronchodilator reversibility.     Food Allergy    Had IgE mediated symptoms with avocado, banana, and latex, manifested by throat closing, lip angioedema, and hives. Required epi in the past. In the past, positive serum IgE for avocado and latex.     Seasonal Allergic Rhinitis Due to Pollen    History of allergic rhinoconjunctivitis symptoms. Was on allergen immunotherapy in the past, many years ago. Stopped immunotherapy secondary to systemic reactions.  In 2020, serum IgE for regional aeroallergen panel showed sensitivity to molds, cat, dog, grass pollen, and weed pollen.        Emma reports that her current asthma symptoms are well controlled with Symbicort 160/4.5 mcg 2 puffs once daily, montelukast 10 mg by mouth once daily at bedtime, and albuterol as needed.  She had COVID-19 infection in December 2022. She states that she ended up on prednisone for a couple of weeks. She  elected not to use Paxlovid while being on Eliquis.  Matilde reports recovering since then. Heather uses albuterol HFA  less than twice weekly for rescue from chest symptoms. She wakes up less than twice per month due to chest symptoms. The patient denies current chest tightness, cough, wheezing, or shortness of breath.      She reports good rhinitis control with diphenhydramine every night, and Claritin-D as needed in Spring, Summer, and Fall. She states that she did not need to use any nasal sprays seasonally.    Developed some rash at hairline within the past couple of months. It may last for weeks.          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     Allergic rhinitis due to mold     Allergic rhinitis due to animal dander     Thrush     Latex allergy     H/O: hysterectomy       Past Medical History:   Diagnosis Date     Allergic rhinitis      Asthma      Atrial fibrillation (H)      Cough      Fluid retention      Tachycardia       Problem (# of Occurrences) Relation (Name,Age of Onset)    No Known Problems (9) Mother, Father, Sister, Brother, Maternal Grandmother, Maternal Grandfather, Paternal Grandmother, Paternal Grandfather, Other       Negative family history of: Glaucoma, Macular Degeneration        Past Surgical History:   Procedure Laterality Date     CYSTOSCOPY  5/11/2012    Procedure:CYSTOSCOPY; Surgeon:RADHA COMER; Location:[facility]     GYN SURGERY      csection, infertility, hyst     HYSTERECTOMY  1992     HYSTERECTOMY, PAP NO LONGER INDICATED       SLING TRANSVAGINAL  5/11/2012    Procedure:SLING TRANSVAGINAL; mini arc sling  and cystoscopy; Surgeon:RADHA COMER; Location:[facility]     Social History     Socioeconomic History     Marital status:      Spouse name: None     Number of children: 4     Years of education: None      Highest education level: None   Occupational History     Comment: retired   Tobacco Use     Smoking status: Former     Smokeless tobacco: Never   Vaping Use     Vaping Use: Never used   Substance and Sexual Activity     Alcohol use: Yes     Drug use: No     Sexual activity: Yes     Partners: Male     Birth control/protection: None, Post-menopausal, Female Surgical     Comment: [facility]   Social History Narrative    ENVIRONMENTAL HISTORY: [place] The home is heated with a forced air. They do have central air conditioning. The patient's bedroom is furnished with carpeting in bedroom.  Pets inside the house include 1 dog(s). There is no history of cockroach or mice infestation. There is/are 0 smokers in the house.  The house does not have a damp basement.               Review of Systems   Constitutional: Negative for activity change, chills and fever.   HENT: Negative for congestion, ear discharge, postnasal drip, rhinorrhea, sinus pressure and sneezing.    Eyes: Negative for discharge, redness and itching.   Respiratory: Negative for cough, chest tightness, shortness of breath and wheezing.    Cardiovascular: Negative for chest pain.   Gastrointestinal: Negative for diarrhea, nausea and vomiting.   Musculoskeletal: Negative for arthralgias and myalgias.   Skin: Positive for rash.   Allergic/Immunologic: Positive for environmental allergies.   Neurological: Negative for headaches.   Hematological: Negative for adenopathy.   Psychiatric/Behavioral: Negative for self-injury.           Current Outpatient Medications:      Cholecalciferol (VITAMIN D-3 PO), Take 1 tablet by mouth, Disp: , Rfl:      CLARITIN-D 24 HOUR  MG per 24 hr tablet, TK 1 T PO  D, Disp: , Rfl: 0     dexamethasone (DECADRON) 0.1 % ophthalmic solution, Place 1 drop into the right eye every other day, Disp: 5 mL, Rfl: 6     erythromycin (ROMYCIN) 5 MG/GM ophthalmic ointment, 1 application to each eye at  bedtime, Disp: 3.5 g, Rfl: 2     estradiol (HARVEY) 0.025 MG/24HR bi-weekly patch, APPLY 1 PATCH TO THE SKIN TWICE A WEEK, Disp: 24 patch, Rfl: 3     fluconazole (DIFLUCAN) 100 MG tablet, 200 mg loading dose on day one, followed by 100mg by mouth daily for 7 days, Disp: 8 tablet, Rfl: 0     glucagon 1 MG kit, Inject 1mg as needed for anaphylaxis that is non-responding to epinephrine., Disp: 1 mg, Rfl: 0     Hypromellose (ARTIFICIAL TEARS OP), Apply 1 drop to eye as needed Usually every 30 minutes., Disp: , Rfl:      metoprolol tartrate (LOPRESSOR) 25 MG tablet, , Disp: , Rfl:      montelukast (SINGULAIR) 10 MG tablet, Take 1 tablet (10 mg) by mouth At Bedtime, Disp: 30 tablet, Rfl: 11     order for DME, Equipment being ordered: Nebulizer, Disp: 1 Box, Rfl: 0     SYMBICORT 160-4.5 MCG/ACT Inhaler, Inhale 2 puffs into the lungs 2 times daily, Disp: 10.2 g, Rfl: 5     triamterene-HCTZ (DYAZIDE) 37.5-25 MG capsule, TAKE TWO CAPSULES BY MOUTH EVERY DAY AS NEEDED FOR FLUID RETENION, Disp: 180 capsule, Rfl: 1     acyclovir (ZOVIRAX) 400 MG tablet, Take 1 tablet (400 mg) by mouth 2 times daily Please keep appt 2/19/21, Disp: 60 tablet, Rfl: 11     albuterol (ACCUNEB) 1.25 MG/3ML neb solution, Take 2 vials (2.5 mg) by nebulization every 4 hours as needed for shortness of breath / dyspnea or wheezing, Disp: 30 vial, Rfl: 4     albuterol (PROAIR HFA/PROVENTIL HFA/VENTOLIN HFA) 108 (90 Base) MCG/ACT inhaler, Inhale 2 puffs into the lungs every 4 hours as needed for shortness of breath or wheezing, Disp: 18 g, Rfl: 2     albuterol (PROVENTIL) (2.5 MG/3ML) 0.083% neb solution, Take 1 vial (2.5 mg) by nebulization every 4 hours as needed for shortness of breath / dyspnea or wheezing, Disp: 120 mL, Rfl: 1     ELIQUIS ANTICOAGULANT 5 MG tablet, Take 1 tablet by mouth 2 times daily, Disp: , Rfl:      EPINEPHrine (AUVI-Q) 0.3 MG/0.3ML injection 2-pack, Inject 0.3 mLs (0.3 mg) into the muscle as needed for anaphylaxis, Disp: 2 each,  "Rfl: 1     fluticasone (FLONASE) 50 MCG/ACT nasal spray, Spray 2 sprays into both nostrils daily, Disp: 16 g, Rfl: 4  No current facility-administered medications for this visit.  Immunization History   Administered Date(s) Administered     COVID-19 Vaccine 12+ (Pfizer) 03/05/2021, 03/26/2021, 11/08/2021     DTaP, Unspecified 09/05/2014     Pneumococcal 23 valent 10/16/2013     TD (ADULT, 7+) 04/01/2003     Allergies   Allergen Reactions     Latex Anaphylaxis     Xray Dye  [Contrast Dye] Hives     Pt states she had allergic reaction to xray day several years ago (hives) Pt has been successfully premedicated for contrast allergy without problem.      Sulfa Drugs      Penicillins Rash     Other reaction(s): Swelling, lips/tongue  Childhood rash     OBJECTIVE:                                                                 /78   Pulse 65   Ht 1.702 m (5' 7\")   Wt 71 kg (156 lb 8.4 oz)   SpO2 100%   BMI 24.52 kg/m          Physical Exam  Vitals and nursing note reviewed.   Constitutional:       General: She is not in acute distress.     Appearance: She is not ill-appearing, toxic-appearing or diaphoretic.   HENT:      Head: Normocephalic and atraumatic.      Right Ear: Tympanic membrane, ear canal and external ear normal.      Left Ear: Tympanic membrane, ear canal and external ear normal.      Nose: No mucosal edema, congestion or rhinorrhea.      Right Turbinates: Not enlarged, swollen or pale.      Left Turbinates: Not enlarged, swollen or pale.      Mouth/Throat:      Lips: Pink.      Mouth: Mucous membranes are moist.      Pharynx: Oropharynx is clear. No pharyngeal swelling, oropharyngeal exudate, posterior oropharyngeal erythema or uvula swelling.   Eyes:      General:         Right eye: No discharge.         Left eye: No discharge.      Conjunctiva/sclera: Conjunctivae normal.   Cardiovascular:      Rate and Rhythm: Normal rate and regular rhythm.      Heart sounds: Normal heart sounds. No murmur " heard.  Pulmonary:      Effort: Pulmonary effort is normal. No respiratory distress.      Breath sounds: Normal breath sounds and air entry. No stridor, decreased air movement or transmitted upper airway sounds. No decreased breath sounds, wheezing, rhonchi or rales.   Musculoskeletal:      Cervical back: Normal range of motion.   Skin:     General: Skin is warm.      Comments: Hairline and on scalp, small, white, papular lesions.  Do not seem to be consistent with urticaria or dermatitis.   Neurological:      Mental Status: She is alert and oriented to person, place, and time.   Psychiatric:         Mood and Affect: Mood normal.         Behavior: Behavior normal.               WORKUP: Spirometry      SPIROMETRY  initial FVC 2.60L (75% of predicted); after bronchodilator 2.95L (+13% change)   initial FEV1 1.70L (64% of predicted); after bronchodilator 1.82L (+7% change)  initial FEV1/FVC 65%; after bronchodilator 61%    I have reviewed and interpreted these results.  The office spirometry performed today suggests mild to moderate obstruction. It is worse compared to 2020.  Postbronchodilator response in FVC noted.    Asthma Control Test (ACT) total score: 24      ASSESSMENT/PLAN:    Moderate persistent asthma without complication    While the patient reports that her symptoms well controlled, her spirometry is worse when compared to 2020.  There is a postbronchodilator response.  It suggests that she may benefit from additional asthma meds.  She uses Symbicort only once daily.  - Continue montelukast 10 mg by mouth once daily.  - Increase Symbicort to 2 puffs twice daily.  -Use albuterol inhaler 2-4 puffs every 4 hours as needed for chest tightness/wheezing/shortness of breath/persistent cough.   I will see the patient back in 3 months to reassess her asthma and spirometry.    - BRONCHODILATION RESPONSE, PRE/POST ADMIN  - montelukast (SINGULAIR) 10 MG tablet  Dispense: 30 tablet; Refill: 11  - SYMBICORT 160-4.5  MCG/ACT Inhaler  Dispense: 10.2 g; Refill: 5      Seasonal allergic rhinitis due to pollen  Allergic rhinitis due to animal dander  Allergic rhinitis due to mold  Currently, she denies any symptoms.  - Advised against using the first generation of H1 antihistamines.  I suggest stopping diphenhydramine consistently.  - Start either loratadine or cetirizine 10 mg by mouth once daily as needed.  I suggest not to rely on Claritin-D consistently.  If she develops persistent nasal symptoms, started intranasal fluticasone.    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  In 2020, serum IgE for venom was negative.  - Anticipate skin test with venoms.  We will discuss that in 3 months when she comes back for asthma follow-up.    Rash  Does not seem to be consistent with chronic dermatitis or eczema.  It does not appear urticarial.  - I recommend the patient to be evaluated by Dermatology.         Return in about 3 months (around 6/7/2023), or if symptoms worsen or fail to improve.    Thank you for allowing us to participate in the care of this patient. Please feel free to contact us if there are any questions or concerns about the patient.    Disclaimer: This note consists of symbols derived from keyboarding, dictation and/or voice recognition software. As a result, there may be errors in the script that have gone undetected. Please consider this when interpreting information found in this chart.    Arturo Patel MD, FAAAAI, FACANTHONYI  Allergy, Asthma and Immunology     [facility]        Again, thank you for allowing me to participate in the care of your patient.        Sincerely,        Arturo Patel MD

## 2023-03-28 NOTE — TELEPHONE ENCOUNTER
"Requested Prescriptions   Pending Prescriptions Disp Refills     triamterene-HCTZ (DYAZIDE) 37.5-25 MG capsule [Pharmacy Med Name: TRIAMTERENE 37.5MG/ HCTZ 25MG CAPS] 180 capsule 0     Sig: TAKE 2 CAPSULES BY MOUTH AS NEEDED FOR FLUID RETENTION       Diuretics (Including Combos) Protocol Failed - 10/1/2020  5:13 AM        Failed - Normal serum creatinine on file in past 12 months     Recent Labs   Lab Test 05/30/14   CR 0.74              Failed - Normal serum potassium on file in past 12 months     Recent Labs   Lab Test 05/30/14   POTASSIUM 3.6                    Failed - Normal serum sodium on file in past 12 months     No lab results found.           Passed - Blood pressure under 140/90 in past 12 months     BP Readings from Last 3 Encounters:   03/11/20 106/62   11/21/19 100/60   10/24/18 106/70                 Passed - Recent (12 mo) or future (30 days) visit within the authorizing provider's specialty     Patient has had an office visit with the authorizing provider or a provider within the authorizing providers department within the previous 12 mos or has a future within next 30 days. See \"Patient Info\" tab in inbasket, or \"Choose Columns\" in Meds & Orders section of the refill encounter.              Passed - Medication is active on med list        Passed - Patient is age 18 or older        Passed - No active pregancy on record        Passed - No positive pregnancy test in past 12 months           Last Written Prescription Date:  7/1/20  Last Fill Quantity: 180,  # refills: 0   Last office visit: 11/21/2019 with prescribing provider:  Mo   Future Office Visit:  None    Routing to provider to advise.  Okay to send?  Melva Loya RN on 10/1/2020 at 8:42 AM        "
[facility] XR negative - No fracture, No dislocation, No foreign body
Electrophysiology study with ablation of focus atrial fibrillation

## 2023-03-30 ENCOUNTER — OFFICE VISIT (OUTPATIENT)
Dept: OPHTHALMOLOGY | Facility: CLINIC | Age: 68
End: 2023-03-30
Attending: OPHTHALMOLOGY
Payer: COMMERCIAL

## 2023-03-30 DIAGNOSIS — H04.123 DRY EYE SYNDROME OF BOTH EYES: ICD-10-CM

## 2023-03-30 DIAGNOSIS — H16.9 KERATITIS: ICD-10-CM

## 2023-03-30 DIAGNOSIS — B00.52 HERPES SIMPLEX DISCIFORM KERATITIS: ICD-10-CM

## 2023-03-30 DIAGNOSIS — H04.129 DRY EYES DUE TO DECREASED TEAR PRODUCTION: Primary | ICD-10-CM

## 2023-03-30 PROCEDURE — 99214 OFFICE O/P EST MOD 30 MIN: CPT | Mod: 25 | Performed by: OPHTHALMOLOGY

## 2023-03-30 PROCEDURE — 68761 CLOSE TEAR DUCT OPENING: CPT | Mod: RT | Performed by: OPHTHALMOLOGY

## 2023-03-30 RX ORDER — ACYCLOVIR 400 MG/1
400 TABLET ORAL 2 TIMES DAILY
Qty: 180 TABLET | Refills: 4 | Status: SHIPPED | OUTPATIENT
Start: 2023-03-30 | End: 2024-04-19

## 2023-03-30 RX ORDER — ERYTHROMYCIN 5 MG/G
OINTMENT OPHTHALMIC AT BEDTIME
Qty: 3.5 G | Refills: 11 | Status: SHIPPED | OUTPATIENT
Start: 2023-03-30 | End: 2024-04-12

## 2023-03-30 ASSESSMENT — VISUAL ACUITY
METHOD: SNELLEN - LINEAR
OD_CC: 20/20
OS_PH_CC+: -2
OS_PH_CC: 20/25
OS_CC+: -3
OS_CC: 20/25
OD_CC+: -2

## 2023-03-30 ASSESSMENT — TONOMETRY
IOP_METHOD: TONOPEN
OD_IOP_MMHG: 13
OS_IOP_MMHG: 12

## 2023-03-30 ASSESSMENT — EXTERNAL EXAM - LEFT EYE: OS_EXAM: NO VESICULAR LESIONS

## 2023-03-30 ASSESSMENT — EXTERNAL EXAM - RIGHT EYE: OD_EXAM: NO VESICULAR LESIONS

## 2023-03-30 NOTE — PROGRESS NOTES
HPI: Heather Wilkinson is a female (: 1955) who presents for fuv.    Interval hx 3/30/23:   Punctal plug fell out of right eye a couple months ago. Otherwise no issues. No changes since last year. Her eyes feel dry in the mornings in the winter. No new flashes, floaters, or diplopia. No pain, redness, or tearing.     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   - erythromycin ointment QHS both eyes - lubrication use  - PF AT 3-4x daily each eye.    Assessment & Plan     # Keratitis - Right Eye - Resolved, no corneal scar. doing well  # Dry eye syndrome right eye > left eye   # Floppy eyelid syndrome  # Oral HSV, resolved  - herpetic keratitis previously  -likely worsened with other confounding issues:  -incomplete blink, floppy eyelids with exposure pattern OU, hx of cold sores with suspect viral keratitis recently and previously weekly CL use (>15 years)    -permanent silicone plug fellow out of right lower lid a couple months ago.   PLAN:   - today 3/30/23 replacing punctal plug RLL   - continue acyclovir 400 mg PO BID for ppx (this is also an appropriate dose for oral mucocutaneous lesions, which pt would like to continue) - No cold sores since started   - In the past patient used dexamethasone 0.1% every day PRN irritation. Discussed risk of prolonged steroid use. Patient has not used this over the past year.    - continue PF AT QID and as needed    - continue EES or Refresh PM at bedtime/flat nightmask   - recommend using eyelid scrubs to remove makeup nightly.       Return to clinic: Annually, sooner if problems       Silverio Kate MD  Ophthalmology, PGY-3    Attending Physician Attestation:  Complete documentation of historical and exam elements from today's encounter can be found in the full encounter summary report  (not reduplicated in this progress note).  I personally obtained the chief complaint(s) and history of present illness.  I confirmed and edited as necessary the review of systems, past medical/surgical history, family history, social history, and examination findings as documented by others; and I examined the patient myself.  I personally reviewed the relevant tests, images, and reports as documented above.  I formulated and edited as necessary the assessment and plan and discussed the findings and management plan with the patient and family. - Alexander Rodriguez MD     I was present for the entire procedure(s). - Alexander Rodriguez MD

## 2023-04-07 NOTE — PROGRESS NOTES
SUBJECTIVE:                                                     Heather is a 67 year old  female who presents for annual exam.     Besides routine health maintenance,  she would like to discuss  Vaginal discharge and bladder infection.    Do you have a Health Care Directive?: Yes, advance care planning is on file.    Fall risk:   Fallen 2 or more times in the past year?: No  Any fall with injury in the past year?: No    HPI:  Patient presents today for annual well woman visit. Patient is postmenopausal, on estradiol 0.025mg bi-weekly patch. Hysterectomy in , transvaginal sling with cystoscopy in . Seen in urgent care on 3/11/22 for UTI. Prescribed Macrobid with improvement of symptoms. Endorses she did however have change in vaginal discharge. Reports it was thick green and yellow in color with possible blood. Unsure if the blood was the vagina or urethra. Hematuria was noted on the UA/UC. All lab work completed with cardiology and PCP. Otherwise, no concerns. Patient agreeable to have student perform exam component of today's visit.  The patient's PCP is  [facility].      No LMP recorded. Patient is postmenopausal..   Patient is sexually active, .  Using menopause for contraception.   STD testing offered?  Declined   reports that she has quit smoking. She has never used smokeless tobacco.    Health maintenance updated:    Last mammogram: 3/24/22, normal. Next due: Today  Last colonoscopy: Not found in chart.  Last Dexa: 3/24/2022, moderate osteopenia. Medication management not recommended at that time.      Overdue  Never   Done COLORECTAL CANCER SCREENING (COLONOSCOPY - Preferred) (Every 10 Years)   Never   Done HEPATITIS C SCREENING (Once)   Never   Done LUNG CANCER SCREENING (Yearly)   MAR 11   2021 ASTHMA ACTION PLAN (Yearly)  Last completed: Mar 11, 2020   JUL 20   2021 LIPID (Every 5 Years)  Last completed:  PHQ-2 (once per calendar year) (Yearly,  January to December)  Last completed: Mar 24, 2022   MAR 24   2023 FALL RISK ASSESSMENT (Yearly)  Last completed: Mar 24, 2022     Upcoming  SEP 7   2023 ASTHMA CONTROL TEST (Every 6 Months)  Last completed: Mar 7, 2023   SEP 15   2023 MEDICARE ANNUAL WELLNESS VISIT (Yearly)   Last completed: Sep 15, 2022   MAR 24   2024 MAMMO SCREENING (Every 2 Years)   Scheduled for: Apr 10, 2023   SEP 5   2024 DTAP/TDAP/TD IMMUNIZATION (2 - Tdap)  Last completed: Sep 5, 2014   FEB 16   2026 ADVANCE CARE PLANNING (Every 5 Years)  Last completed: Feb 16, 2021   MAR 24   2037 DEXA (Every 15 Years)  Last completed: Mar 24, 2022       Today's PHQ-2 Score:     3/24/2022     1:02 PM   PHQ-2 ( 1999 [employer])   Q1: Little interest or pleasure in doing things 0   Q2: Feeling down, depressed or hopeless 0   PHQ-2 Score 0     Today's PHQ-9 Score:       3/24/2022     1:02 PM   PHQ-9 SCORE   PHQ-9 Total Score 0     Today's ERICA-7 Score:       3/24/2022     1:02 PM   ERICA-7 SCORE   Total Score 0       Problem list and histories reviewed & adjusted, as indicated.  Additional history: as documented.    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     Allergic rhinitis due to mold     Allergic rhinitis due to animal dander     Thrush     Latex allergy     H/O: hysterectomy     Past Surgical History:   Procedure Laterality Date     CYSTOSCOPY  5/11/2012    Procedure:CYSTOSCOPY; Surgeon:RADHA COMER; Location:[facility]     GYN SURGERY      csection, infertility, hyst     HYSTERECTOMY  1992     HYSTERECTOMY, PAP NO LONGER INDICATED       SLING TRANSVAGINAL  5/11/2012    Procedure:SLING TRANSVAGINAL; mini arc sling  and cystoscopy; Surgeon:RADHA COMER; Location:[facility]      Social History     Tobacco Use     Smoking status: Former     Smokeless tobacco: Never   Vaping Use     Vaping status:  Never Used   Substance Use Topics     Alcohol use: Yes      Problem (# of Occurrences) Relation (Name,Age of Onset)    No Known Problems (9) Mother, Father, Sister, Brother, Maternal Grandmother, Maternal Grandfather, Paternal Grandmother, Paternal Grandfather, Other       Negative family history of: Glaucoma, Macular Degeneration            Current Outpatient Medications   Medication Sig     acyclovir (ZOVIRAX) 400 MG tablet Take 1 tablet (400 mg) by mouth 2 times daily     albuterol (ACCUNEB) 1.25 MG/3ML neb solution Take 2 vials (2.5 mg) by nebulization every 4 hours as needed for shortness of breath / dyspnea or wheezing     albuterol (PROAIR HFA/PROVENTIL HFA/VENTOLIN HFA) 108 (90 Base) MCG/ACT inhaler Inhale 2 puffs into the lungs every 4 hours as needed for shortness of breath or wheezing     albuterol (PROVENTIL) (2.5 MG/3ML) 0.083% neb solution Take 1 vial (2.5 mg) by nebulization every 4 hours as needed for shortness of breath / dyspnea or wheezing     Cholecalciferol (VITAMIN D-3 PO) Take 1 tablet by mouth     CLARITIN-D 24 HOUR  MG per 24 hr tablet TK 1 T PO  D     dexamethasone (DECADRON) 0.1 % ophthalmic solution Place 1 drop into the right eye every other day     ELIQUIS ANTICOAGULANT 5 MG tablet Take 1 tablet by mouth 2 times daily     EPINEPHrine (AUVI-Q) 0.3 MG/0.3ML injection 2-pack Inject 0.3 mLs (0.3 mg) into the muscle as needed for anaphylaxis     erythromycin (ROMYCIN) 5 MG/GM ophthalmic ointment Place into both eyes At Bedtime 1 application to each eye at bedtime     estradiol (HARVEY) 0.025 MG/24HR bi-weekly patch APPLY 1 PATCH TO THE SKIN TWICE A WEEK     fluconazole (DIFLUCAN) 100 MG tablet 200 mg loading dose on day one, followed by 100mg by mouth daily for 7 days     fluticasone (FLONASE) 50 MCG/ACT nasal spray Spray 2 sprays into both nostrils daily     glucagon 1 MG kit Inject 1mg as needed for anaphylaxis that is non-responding to epinephrine.     Hypromellose (ARTIFICIAL TEARS  "OP) Apply 1 drop to eye as needed Usually every 30 minutes.     metoprolol tartrate (LOPRESSOR) 25 MG tablet      montelukast (SINGULAIR) 10 MG tablet Take 1 tablet (10 mg) by mouth At Bedtime     order for DME Equipment being ordered: Nebulizer     SYMBICORT 160-4.5 MCG/ACT Inhaler Inhale 2 puffs into the lungs 2 times daily     triamterene-HCTZ (DYAZIDE) 37.5-25 MG capsule TAKE TWO CAPSULES BY MOUTH EVERY DAY AS NEEDED FOR FLUID RETENION     No current facility-administered medications for this visit.     Allergies   Allergen Reactions     Latex Anaphylaxis     Xray Dye  [Contrast Dye] Hives     Pt states she had allergic reaction to xray day several years ago (hives) Pt has been successfully premedicated for contrast allergy without problem.      Sulfa Drugs      Penicillins Rash     Other reaction(s): Swelling, lips/tongue  Childhood rash       ROS:  Genitourinary: Vaginal Discharge  No urinary frequency or dysuria, bladder or kidney problems, Normal menstrual cycles      OBJECTIVE:     /60   Ht 1.702 m (5' 7\")   Wt 70.1 kg (154 lb 9.6 oz)   BMI 24.21 kg/m    Body mass index is 24.21 kg/m .    Exam:  Constitutional:  Appearance: Well nourished, well developed alert, in no acute distress  Neck:  Lymph Nodes:  No lymphadenopathy present; Thyroid:  Gland size normal, nontender, no nodules or masses present on palpation  Chest:  Respiratory Effort:  Breathing unlabored. Clear to auscultation bilaterally.   Cardiovascular: Heart: Auscultation:  Regular rate, normal rhythm, no murmurs present  Breasts:  Inspection of Breasts:  Symmetric bilaterally.  No puckering.  No skin changes.  Palpation of Breasts and Axillae:  No masses present on palpation, no breast tenderness Axillary Lymph Nodes:  No lymphadenopathy present  Gastrointestinal:  Abdominal Examination:  Abdomen nontender to palpation, tone normal without rigidity or guarding, no masses present, umbilicus without lesions; Liver/Spleen:  No hepatomegaly " present, liver nontender to palpation; Hernias:  No hernias present  Skin: General Inspection:  No rashes present, no lesions present, no areas of discoloration.  Neurologic:  Mental Status:  Oriented X3.  Normal strength and tone, sensory exam grossly normal, mentation intact and speech normal.    Psychiatric:  Mentation appears normal and affect normal/bright.  Pelvic Exam:  External Genitalia:     Normal appearance for age, no discharge present, no tenderness present, no inflammatory lesions present, color normal  Vagina:     Normal vaginal vault without central or paravaginal defects, no discharge present, no inflammatory lesions present, no masses present. Pap collected at cervical cuff. Swab for vaginal culture collected.   Bladder:     Nontender to palpation  Urethra:   Urethral Body:  Urethra palpation normal, urethra structural support normal   Urethral Meatus:  No erythema or lesions present  Cervix:     Surgically absent.   Uterus:     Surgically absent  Adnexa:     Surgically absent  Perineum:     Perineum within normal limits, no evidence of trauma, no rashes or skin lesions present  Anus:     Anus within normal limits, no hemorrhoids present  Inguinal Lymph Nodes:     No lymphadenopathy present  Pubic Hair:     Normal pubic hair distribution for age  Genitalia and Groin:     No rashes present, no lesions present, no areas of discoloration, no masses present       In-Clinic Test Results:  Results for orders placed or performed in visit on 04/10/23 (from the past 24 hour(s))   Bacterial Vaginosis Smear    Specimen: Vagina; Swab    Narrative    The following orders were created for panel order Bacterial Vaginosis Smear.  Procedure                               Abnormality         Status                     ---------                               -----------         ------                     Bacterial Vaginosis Stain[[id number]]                                                     Please view results for  these tests on the individual orders.       ASSESSMENT/PLAN:                                                        ICD-10-CM    1. Encounter for gynecological examination without abnormal finding  Z01.419 Pap screen with HPV - recommended age 30 - 65 years      2. Vaginal discharge  N89.8 Bacterial Vaginosis Smear     Fungal or Yeast Culture Routine            PLAN  Normal gyn exam. Pap collection at cervical cuff. Will notify patient of results once available. Patient requests to have paps every year. Swab for vaginal culture collected d/t patient's recent history of change in vaginal discharge. Mammogram scheduled for after today's visit. She is up to date on colonoscopy per patient- not found in chart. Labs done with cardiology and PCP. Dexa scan up to date. Medications refilled.   RTC in 1 year for annual well woman visit or sooner if concerns arise.    I, Kaylyn Gusman, completed the PFSH and ROS. I then acted as a scribe for OMID Littlejohn CNP for the remainder of the visit.  Kaylyn Gusman BSN, RN  [facility] DNP, [facility] student    I was present with the student who participated in the service and in the documentation of the note. I have verified the history and medical decision-making.  Student participated in the annual exam and I can attest to being personally present for the entire exam. I agree with the assessment and plan of care as documented in the note. OMID Littlejohn CNP, APRN CNP  [facility] FOR [facility] [place]

## 2023-04-10 ENCOUNTER — ANCILLARY PROCEDURE (OUTPATIENT)
Dept: MAMMOGRAPHY | Facility: CLINIC | Age: 68
End: 2023-04-10
Payer: COMMERCIAL

## 2023-04-10 ENCOUNTER — OFFICE VISIT (OUTPATIENT)
Dept: OBGYN | Facility: CLINIC | Age: 68
End: 2023-04-10
Payer: COMMERCIAL

## 2023-04-10 VITALS
SYSTOLIC BLOOD PRESSURE: 116 MMHG | DIASTOLIC BLOOD PRESSURE: 60 MMHG | BODY MASS INDEX: 24.27 KG/M2 | HEIGHT: 67 IN | WEIGHT: 154.6 LBS

## 2023-04-10 DIAGNOSIS — N89.8 VAGINAL DISCHARGE: ICD-10-CM

## 2023-04-10 DIAGNOSIS — Z01.419 ENCOUNTER FOR GYNECOLOGICAL EXAMINATION WITHOUT ABNORMAL FINDING: Primary | ICD-10-CM

## 2023-04-10 DIAGNOSIS — Z12.31 VISIT FOR SCREENING MAMMOGRAM: ICD-10-CM

## 2023-04-10 LAB
NUGENT SCORE: 0
WHITE BLOOD CELLS: NORMAL

## 2023-04-10 PROCEDURE — 99397 PER PM REEVAL EST PAT 65+ YR: CPT | Performed by: NURSE PRACTITIONER

## 2023-04-10 PROCEDURE — 87205 SMEAR GRAM STAIN: CPT | Performed by: NURSE PRACTITIONER

## 2023-04-10 PROCEDURE — 87624 HPV HI-RISK TYP POOLED RSLT: CPT | Performed by: NURSE PRACTITIONER

## 2023-04-10 PROCEDURE — 77063 BREAST TOMOSYNTHESIS BI: CPT | Mod: TC | Performed by: RADIOLOGY

## 2023-04-10 PROCEDURE — 99213 OFFICE O/P EST LOW 20 MIN: CPT | Mod: 25 | Performed by: NURSE PRACTITIONER

## 2023-04-10 PROCEDURE — 77067 SCR MAMMO BI INCL CAD: CPT | Mod: TC | Performed by: RADIOLOGY

## 2023-04-10 PROCEDURE — G0145 SCR C/V CYTO,THINLAYER,RESCR: HCPCS | Performed by: NURSE PRACTITIONER

## 2023-04-10 PROCEDURE — 87102 FUNGUS ISOLATION CULTURE: CPT | Performed by: NURSE PRACTITIONER

## 2023-04-10 ASSESSMENT — ANXIETY QUESTIONNAIRES
1. FEELING NERVOUS, ANXIOUS, OR ON EDGE: NOT AT ALL
7. FEELING AFRAID AS IF SOMETHING AWFUL MIGHT HAPPEN: NOT AT ALL
2. NOT BEING ABLE TO STOP OR CONTROL WORRYING: NOT AT ALL
GAD7 TOTAL SCORE: 0
GAD7 TOTAL SCORE: 0
IF YOU CHECKED OFF ANY PROBLEMS ON THIS QUESTIONNAIRE, HOW DIFFICULT HAVE THESE PROBLEMS MADE IT FOR YOU TO DO YOUR WORK, TAKE CARE OF THINGS AT HOME, OR GET ALONG WITH OTHER PEOPLE: NOT DIFFICULT AT ALL
5. BEING SO RESTLESS THAT IT IS HARD TO SIT STILL: NOT AT ALL
3. WORRYING TOO MUCH ABOUT DIFFERENT THINGS: NOT AT ALL
6. BECOMING EASILY ANNOYED OR IRRITABLE: NOT AT ALL

## 2023-04-10 ASSESSMENT — PATIENT HEALTH QUESTIONNAIRE - PHQ9
SUM OF ALL RESPONSES TO PHQ QUESTIONS 1-9: 0
5. POOR APPETITE OR OVEREATING: NOT AT ALL

## 2023-04-11 ENCOUNTER — MYC MEDICAL ADVICE (OUTPATIENT)
Dept: OBGYN | Facility: CLINIC | Age: 68
End: 2023-04-11
Payer: COMMERCIAL

## 2023-04-11 DIAGNOSIS — R60.9 EDEMA, UNSPECIFIED TYPE: ICD-10-CM

## 2023-04-11 DIAGNOSIS — N95.1 SYMPTOMATIC MENOPAUSAL OR FEMALE CLIMACTERIC STATES: ICD-10-CM

## 2023-04-11 NOTE — TELEPHONE ENCOUNTER
"Requested Prescriptions   Pending Prescriptions Disp Refills     triamterene-HCTZ (DYAZIDE) 37.5-25 MG capsule [Pharmacy Med Name: TRIAMTERENE-HCTZ 37.5-25MG CAPS] 180 capsule 1     Sig: TAKE TWO CAPSULES BY MOUTH EVERY DAY AS NEEDED FOR FLUID RETENION       Diuretics (Including Combos) Protocol Failed - 4/11/2023  6:46 AM        Failed - Recent (12 mo) or future (30 days) visit within the authorizing provider's specialty     Patient has had an office visit with the authorizing provider or a provider within the authorizing providers department within the previous 12 mos or has a future within next 30 days. See \"Patient Info\" tab in inbasket, or \"Choose Columns\" in Meds & Orders section of the refill encounter.              Failed - Normal serum creatinine on file in past 12 months     No lab results found.           Failed - Normal serum potassium on file in past 12 months     No lab results found.                 Failed - Normal serum sodium on file in past 12 months     No lab results found.           Passed - Blood pressure under 140/90 in past 12 months     BP Readings from Last 3 Encounters:   04/10/23 116/60   03/07/23 121/78   04/12/22 98/63                 Passed - Medication is active on med list        Passed - Patient is age 18 or older        Passed - No active pregancy on record        Passed - No positive pregnancy test in past 12 months             "

## 2023-04-12 RX ORDER — TRIAMTERENE AND HYDROCHLOROTHIAZIDE 37.5; 25 MG/1; MG/1
2 CAPSULE ORAL DAILY
Qty: 180 CAPSULE | Refills: 3 | Status: SHIPPED | OUTPATIENT
Start: 2023-04-12 | End: 2024-06-14

## 2023-04-12 RX ORDER — ESTRADIOL 0.03 MG/D
FILM, EXTENDED RELEASE TRANSDERMAL
Qty: 24 PATCH | Refills: 3 | Status: SHIPPED | OUTPATIENT
Start: 2023-04-12 | End: 2024-03-21

## 2023-04-12 RX ORDER — TRIAMTERENE AND HYDROCHLOROTHIAZIDE 37.5; 25 MG/1; MG/1
CAPSULE ORAL
Qty: 180 CAPSULE | Refills: 1 | Status: SHIPPED | OUTPATIENT
Start: 2023-04-12 | End: 2023-04-12

## 2023-04-14 LAB
BACTERIA SPEC CULT: NO GROWTH
HUMAN PAPILLOMA VIRUS 16 DNA: NEGATIVE
HUMAN PAPILLOMA VIRUS 18 DNA: NEGATIVE
HUMAN PAPILLOMA VIRUS FINAL DIAGNOSIS: NORMAL
HUMAN PAPILLOMA VIRUS OTHER HR: NEGATIVE

## 2023-04-14 NOTE — RESULT ENCOUNTER NOTE
Normal pap/Neg HPV letter sent through [employer] results. Next pap smear and HPV due in 1 year.     Debby Russell, RN, BSN  Pap Tracking Nurse

## 2023-04-17 NOTE — RESULT ENCOUNTER NOTE
Heather      Your  vaginal culture results are negative.  If further questions please give me a call.    Toma Simmons RNC

## 2023-06-06 ENCOUNTER — OFFICE VISIT (OUTPATIENT)
Dept: ALLERGY | Facility: OTHER | Age: 68
End: 2023-06-06
Payer: COMMERCIAL

## 2023-06-06 VITALS
HEIGHT: 67 IN | BODY MASS INDEX: 24.53 KG/M2 | OXYGEN SATURATION: 98 % | SYSTOLIC BLOOD PRESSURE: 109 MMHG | DIASTOLIC BLOOD PRESSURE: 70 MMHG | HEART RATE: 60 BPM | WEIGHT: 156.31 LBS

## 2023-06-06 DIAGNOSIS — R49.0 HOARSENESS: ICD-10-CM

## 2023-06-06 DIAGNOSIS — J30.1 SEASONAL ALLERGIC RHINITIS DUE TO POLLEN: ICD-10-CM

## 2023-06-06 DIAGNOSIS — J30.89 ALLERGIC RHINITIS DUE TO MOLD: ICD-10-CM

## 2023-06-06 DIAGNOSIS — J45.40 MODERATE PERSISTENT ASTHMA WITHOUT COMPLICATION: Primary | ICD-10-CM

## 2023-06-06 DIAGNOSIS — J30.81 ALLERGIC RHINITIS DUE TO ANIMAL DANDER: ICD-10-CM

## 2023-06-06 LAB
FEF 25/75: NORMAL
FEV-1: NORMAL
FEV1/FVC: NORMAL
FVC: NORMAL

## 2023-06-06 PROCEDURE — 99214 OFFICE O/P EST MOD 30 MIN: CPT | Mod: 25 | Performed by: ALLERGY & IMMUNOLOGY

## 2023-06-06 PROCEDURE — 94010 BREATHING CAPACITY TEST: CPT | Performed by: ALLERGY & IMMUNOLOGY

## 2023-06-06 RX ORDER — AZELASTINE 1 MG/ML
2 SPRAY, METERED NASAL 2 TIMES DAILY PRN
Qty: 30 ML | Refills: 3 | Status: SHIPPED | OUTPATIENT
Start: 2023-06-06

## 2023-06-06 RX ORDER — ALBUTEROL SULFATE 90 UG/1
2 AEROSOL, METERED RESPIRATORY (INHALATION) EVERY 4 HOURS PRN
Qty: 18 G | Refills: 2 | Status: SHIPPED | OUTPATIENT
Start: 2023-06-06

## 2023-06-06 RX ORDER — FLUTICASONE PROPIONATE AND SALMETEROL 50; 500 UG/1; UG/1
1 POWDER RESPIRATORY (INHALATION) EVERY 12 HOURS
Qty: 60 EACH | Refills: 4 | Status: SHIPPED | OUTPATIENT
Start: 2023-06-06 | End: 2024-02-13

## 2023-06-06 ASSESSMENT — PAIN SCALES - GENERAL: PAINLEVEL: NO PAIN (0)

## 2023-06-06 ASSESSMENT — ENCOUNTER SYMPTOMS
CHEST TIGHTNESS: 0
CHILLS: 0
COUGH: 0
EYE REDNESS: 0
FEVER: 0
EYE ITCHING: 0
EYE DISCHARGE: 0
ACTIVITY CHANGE: 0
RHINORRHEA: 1
FACIAL SWELLING: 0
HEADACHES: 0
WHEEZING: 0
SINUS PRESSURE: 0
SHORTNESS OF BREATH: 0

## 2023-06-06 ASSESSMENT — ASTHMA QUESTIONNAIRES: ACT_TOTALSCORE: 22

## 2023-06-06 NOTE — LETTER
6/6/2023         RE: Heather Wilkinson  [address] [facility] [address]        Dear Colleague,    Thank you for referring your patient, Heather Wilkinson, to the [facility]. Please see a copy of my visit note below.    SUBJECTIVE:                                                                   Heather Wilkinson presents today to our Allergy Clinic at [facility] for a follow up visit. She is a 67 year old female with  history of asthma, food allergy, and allergic rhinitis.          Problem   Moderate Persistent Asthma Without Complication    History of moderate persistent asthma for years. Was hospitalized as a child. Stayed in ICU but no history of intubations for asthma flares. She smoked 1/2ppd for 20 years. Stopped in her 50's Has a history of recurrent thrush with ICS-containing medications.  In 2020, serum IgE was 330 KiU/L (0-114).  CBC with differential was within normal limits. Absolute eosinophil count was 300. Spirometry in March 2020 suggested mild obstruction without postbronchodilator reversibility.     Food Allergy    Had IgE mediated symptoms with avocado, banana, and latex, manifested by throat closing, lip angioedema, and hives. Required epi in the past. In the past, positive serum IgE for avocado and latex.     Seasonal Allergic Rhinitis Due to Pollen    History of allergic rhinoconjunctivitis symptoms. Was on allergen immunotherapy in the past, many years ago. Stopped immunotherapy secondary to systemic reactions.  In 2020, serum IgE for regional aeroallergen panel showed sensitivity to molds, cat, dog, grass pollen, and weed pollen.       Emma reports that her current asthma symptoms are well controlled with Symbicort 160/4.5 mcg 2 puffs twice daily, montelukast 10 mg by mouth once daily at bedtime, and albuterol as needed. Even with recent smoke in the area, she uses albuterol twice for the past week. She developed some significant  voice raspiness since she increased the dose to 2 puffs twice daily. She wakes up less than twice per month due to chest symptoms. The patient denies current chest tightness, cough, wheezing, or shortness of breath.        Regarding allergic rhinoconjunctivitis, she uses Claritin-D almost daily along with intranasal fluticasone 1 spray in each nostril once daily. She feels she is doing fairly well on this regimen, albeit, she admits having mild postnasal drainage and rhinorrhea due to smoke.         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     Allergic rhinitis due to mold     Allergic rhinitis due to animal dander     Thrush     Latex allergy     H/O: hysterectomy       Past Medical History:   Diagnosis Date     Allergic rhinitis      Asthma      Atrial fibrillation (H)      Cough      Fluid retention      Tachycardia       Problem (# of Occurrences) Relation (Name,Age of Onset)    No Known Problems (9) Mother, Father, Sister, Brother, Maternal Grandmother, Maternal Grandfather, Paternal Grandmother, Paternal Grandfather, Other       Negative family history of: Glaucoma, Macular Degeneration        Past Surgical History:   Procedure Laterality Date     CYSTOSCOPY  5/11/2012    Procedure:CYSTOSCOPY; Surgeon:RADHA COMER; Location:[facility]     GYN SURGERY      csection, infertility, hyst     HYSTERECTOMY  1992     HYSTERECTOMY, PAP NO LONGER INDICATED       SLING TRANSVAGINAL  5/11/2012    Procedure:SLING TRANSVAGINAL; mini arc sling  and cystoscopy; Surgeon:RADHA COMER; Location:[facility]     Social History     Socioeconomic History     Marital status:      Spouse name: None     Number of children: 4     Years of education: None     Highest education level: None   Occupational History     Comment: retired   Tobacco Use     Smoking status:  Former     Smokeless tobacco: Never   Vaping Use     Vaping status: Never Used   Substance and Sexual Activity     Alcohol use: Yes     Drug use: No     Sexual activity: Yes     Partners: Male     Birth control/protection: None, Post-menopausal, Female Surgical     Comment: YULY   Social History Narrative    6/06/23    ENVIRONMENTAL HISTORY: [place] The home is heated with a forced air. They do have central air conditioning. The patient's bedroom is furnished with carpeting in bedroom.  Pets inside the house include 1 dog(s). There is no history of cockroach or mice infestation. There is/are 0 smokers in the house.  The house does not have a damp basement.               Review of Systems   Constitutional: Negative for activity change, chills and fever.   HENT: Positive for postnasal drip and rhinorrhea. Negative for congestion, ear discharge, facial swelling, nosebleeds, sinus pressure and sneezing.         Raspy voice   Eyes: Negative for discharge, redness and itching.        Eye burning   Respiratory: Negative for cough, chest tightness, shortness of breath and wheezing.    Allergic/Immunologic: Positive for environmental allergies.   Neurological: Negative for headaches.           Current Outpatient Medications:      acyclovir (ZOVIRAX) 400 MG tablet, Take 1 tablet (400 mg) by mouth 2 times daily, Disp: 180 tablet, Rfl: 4     ADVAIR DISKUS 500-50 MCG/ACT inhaler, Inhale 1 puff into the lungs every 12 hours, Disp: 60 each, Rfl: 4     albuterol (ACCUNEB) 1.25 MG/3ML neb solution, Take 2 vials (2.5 mg) by nebulization every 4 hours as needed for shortness of breath / dyspnea or wheezing, Disp: 30 vial, Rfl: 4     albuterol (PROAIR HFA/PROVENTIL HFA/VENTOLIN HFA) 108 (90 Base) MCG/ACT inhaler, Inhale 2 puffs into the lungs every 4 hours as needed for shortness of breath or wheezing, Disp: 18 g, Rfl: 2     albuterol (PROVENTIL) (2.5 MG/3ML) 0.083% neb solution, Take 1 vial  (2.5 mg) by nebulization every 4 hours as needed for shortness of breath / dyspnea or wheezing, Disp: 120 mL, Rfl: 1     azelastine (ASTELIN) 0.1 % nasal spray, Spray 2 sprays into both nostrils 2 times daily as needed for rhinitis, Disp: 30 mL, Rfl: 3     Cholecalciferol (VITAMIN D-3 PO), Take 1 tablet by mouth, Disp: , Rfl:      CLARITIN-D 24 HOUR  MG per 24 hr tablet, TK 1 T PO  D, Disp: , Rfl: 0     dexamethasone (DECADRON) 0.1 % ophthalmic solution, Place 1 drop into the right eye every other day, Disp: 5 mL, Rfl: 6     ELIQUIS ANTICOAGULANT 5 MG tablet, Take 1 tablet by mouth 2 times daily, Disp: , Rfl:      EPINEPHrine (AUVI-Q) 0.3 MG/0.3ML injection 2-pack, Inject 0.3 mLs (0.3 mg) into the muscle as needed for anaphylaxis, Disp: 2 each, Rfl: 1     erythromycin (ROMYCIN) 5 MG/GM ophthalmic ointment, Place into both eyes At Bedtime 1 application to each eye at bedtime, Disp: 3.5 g, Rfl: 11     estradiol (HARVEY) 0.025 MG/24HR bi-weekly patch, APPLY 1 PATCH TO THE SKIN TWICE A WEEK, Disp: 24 patch, Rfl: 3     fluconazole (DIFLUCAN) 100 MG tablet, 200 mg loading dose on day one, followed by 100mg by mouth daily for 7 days, Disp: 8 tablet, Rfl: 0     fluticasone (FLONASE) 50 MCG/ACT nasal spray, Spray 2 sprays into both nostrils daily, Disp: 16 g, Rfl: 4     glucagon 1 MG kit, Inject 1mg as needed for anaphylaxis that is non-responding to epinephrine., Disp: 1 mg, Rfl: 0     Hypromellose (ARTIFICIAL TEARS OP), Apply 1 drop to eye as needed Usually every 30 minutes., Disp: , Rfl:      metoprolol tartrate (LOPRESSOR) 25 MG tablet, , Disp: , Rfl:      montelukast (SINGULAIR) 10 MG tablet, Take 1 tablet (10 mg) by mouth At Bedtime, Disp: 30 tablet, Rfl: 11     order for DME, Equipment being ordered: Nebulizer, Disp: 1 Box, Rfl: 0     triamterene-HCTZ (DYAZIDE) 37.5-25 MG capsule, Take 2 capsules by mouth daily As needed for fluid retention, Disp: 180 capsule, Rfl: 3  Immunization History   Administered Date(s)  "Administered     COVID-19 MONOVALENT 12+ (Pfizer) 03/05/2021, 03/26/2021, 11/08/2021     DTaP, Unspecified 09/05/2014     Pneumococcal 23 valent 10/16/2013     TD,PF 7+ (Tenivac) 04/01/2003     Allergies   Allergen Reactions     Latex Anaphylaxis     Xray Dye  [Contrast Dye] Hives     Pt states she had allergic reaction to xray day several years ago (hives) Pt has been successfully premedicated for contrast allergy without problem.      Sulfa Antibiotics      Penicillins Rash     Other reaction(s): Swelling, lips/tongue  Childhood rash     OBJECTIVE:                                                                 /70   Pulse 60   Ht 1.702 m (5' 7\")   Wt 70.9 kg (156 lb 4.9 oz)   SpO2 98%   BMI 24.48 kg/m          Physical Exam  Vitals and nursing note reviewed.   Constitutional:       General: She is not in acute distress.     Appearance: She is not ill-appearing, toxic-appearing or diaphoretic.   HENT:      Head: Normocephalic and atraumatic.      Comments: Raspy voice on today's exam.     Right Ear: Tympanic membrane, ear canal and external ear normal.      Left Ear: Tympanic membrane, ear canal and external ear normal.      Nose: No mucosal edema, congestion or rhinorrhea.      Right Turbinates: Not enlarged, swollen or pale.      Left Turbinates: Not enlarged, swollen or pale.      Mouth/Throat:      Lips: Pink.      Mouth: Mucous membranes are moist.      Pharynx: Oropharynx is clear. No pharyngeal swelling, oropharyngeal exudate, posterior oropharyngeal erythema or uvula swelling.   Eyes:      General:         Right eye: No discharge.         Left eye: No discharge.      Conjunctiva/sclera: Conjunctivae normal.   Cardiovascular:      Rate and Rhythm: Normal rate and regular rhythm.      Heart sounds: Normal heart sounds. No murmur heard.  Pulmonary:      Effort: Pulmonary effort is normal. No respiratory distress.      Breath sounds: Normal breath sounds and air entry. No stridor, decreased air " movement or transmitted upper airway sounds. No decreased breath sounds, wheezing, rhonchi or rales.   Musculoskeletal:      Cervical back: Normal range of motion.   Neurological:      Mental Status: She is alert and oriented to person, place, and time.   Psychiatric:         Mood and Affect: Mood normal.         Behavior: Behavior normal.               WORKUP:     SPIROMETRY       FVC 3.04L (88% of predicted).     FEV1 1.80L (68% of predicted).     FEV1/FVC 59%      I have reviewed and interpreted these results.  The office spirometry performed today suggests mild obstruction.  FVC has improved compared to previous visit.    Asthma Control Test (ACT) total score: 22       ASSESSMENT/PLAN:    Moderate persistent asthma without complication  Hoarseness    Asthma symptoms well controlled.  Spirometry improved after increasing his Symbicort 160/4.5 mcg to 2 puffs twice daily.  Unfortunately, she developed significant hoarseness, which is likely due to increasing the dose of ICS.  - Continue montelukast 10 mg by mouth once daily as is.  - Stop Symbicort.  - Start Advair 500/50 mcg 1 puff twice daily. rinse/gargle/spit water after use    - I will see her back in 10 to 12 weeks to assess asthma and repeat spirometry.    - BREATHING CAPACITY TEST [30691]  - ADVAIR DISKUS 500-50 MCG/ACT inhaler  Dispense: 60 each; Refill: 4  - albuterol (PROAIR HFA/PROVENTIL HFA/VENTOLIN HFA) 108 (90 Base) MCG/ACT inhaler  Dispense: 18 g; Refill: 2          Seasonal allergic rhinitis due to pollen  Allergic rhinitis due to animal dander  Allergic rhinitis due to mold    - Use intranasal fluticasone (Flonase) 1-2 sprays in each nostril once daily.  - Add azelastine nasal spray, 2 sprays in each nostril twice daily as needed.  Would be interesting to see if a combination of intranasal steroid and intranasal antihistamine will help reducing the need in oral decongestant.  - For now, continue with Claritin-D as needed.    - azelastine (ASTELIN)  0.1 % nasal spray  Dispense: 30 mL; Refill: 3        Return in about 10 weeks (around 8/15/2023), or if symptoms worsen or fail to improve.    Thank you for allowing us to participate in the care of this patient. Please feel free to contact us if there are any questions or concerns about the patient.    Disclaimer: This note consists of symbols derived from keyboarding, dictation and/or voice recognition software. As a result, there may be errors in the script that have gone undetected. Please consider this when interpreting information found in this chart.    Arturo Patel MD, FAAAAI, FACAAI  Allergy, Asthma and Immunology     [facility]        Again, thank you for allowing me to participate in the care of your patient.        Sincerely,        Arturo Patel MD

## 2023-06-06 NOTE — PROGRESS NOTES
SUBJECTIVE:                                                                   Heather Wilkinson presents today to our Allergy Clinic at [facility] for a follow up visit. She is a 67 year old female with  history of asthma, food allergy, and allergic rhinitis.      Problem   Moderate Persistent Asthma Without Complication    History of moderate persistent asthma for years. Was hospitalized as a child. Stayed in ICU but no history of intubations for asthma flares. She smoked 1/2ppd for 20 years. Stopped in her 50's Has a history of recurrent thrush with ICS-containing medications.  In 2020, serum IgE was 330 KiU/L (0-114).  CBC with differential was within normal limits. Absolute eosinophil count was 300. Spirometry in March 2020 suggested mild obstruction without postbronchodilator reversibility.     Food Allergy    Had IgE mediated symptoms with avocado, banana, and latex, manifested by throat closing, lip angioedema, and hives. Required epi in the past. In the past, positive serum IgE for avocado and latex.     Seasonal Allergic Rhinitis Due to Pollen    History of allergic rhinoconjunctivitis symptoms. Was on allergen immunotherapy in the past, many years ago. Stopped immunotherapy secondary to systemic reactions.  In 2020, serum IgE for regional aeroallergen panel showed sensitivity to molds, cat, dog, grass pollen, and weed pollen.       Emma reports that her current asthma symptoms are well controlled with Symbicort 160/4.5 mcg 2 puffs twice daily, montelukast 10 mg by mouth once daily at bedtime, and albuterol as needed. Even with recent smoke in the area, she uses albuterol twice for the past week. She developed some significant voice raspiness since she increased the dose to 2 puffs twice daily. She wakes up less than twice per month due to chest symptoms. The patient denies current chest tightness, cough, wheezing, or shortness of breath.        Regarding allergic  rhinoconjunctivitis, she uses Claritin-D almost daily along with intranasal fluticasone 1 spray in each nostril once daily. She feels she is doing fairly well on this regimen, albeit, she admits having mild postnasal drainage and rhinorrhea due to smoke.         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     Allergic rhinitis due to mold     Allergic rhinitis due to animal dander     Thrush     Latex allergy     H/O: hysterectomy       Past Medical History:   Diagnosis Date     Allergic rhinitis      Asthma      Atrial fibrillation (H)      Cough      Fluid retention      Tachycardia       Problem (# of Occurrences) Relation (Name,Age of Onset)    No Known Problems (9) Mother, Father, Sister, Brother, Maternal Grandmother, Maternal Grandfather, Paternal Grandmother, Paternal Grandfather, Other       Negative family history of: Glaucoma, Macular Degeneration        Past Surgical History:   Procedure Laterality Date     CYSTOSCOPY  5/11/2012    Procedure:CYSTOSCOPY; Surgeon:RADHA COMER; Location:[facility]     GYN SURGERY      csection, infertility, hyst     HYSTERECTOMY  1992     HYSTERECTOMY, PAP NO LONGER INDICATED       SLING TRANSVAGINAL  5/11/2012    Procedure:SLING TRANSVAGINAL; mini arc sling  and cystoscopy; Surgeon:RADHA COMER; Location:[facility]     Social History     Socioeconomic History     Marital status:      Spouse name: None     Number of children: 4     Years of education: None     Highest education level: None   Occupational History     Comment: retired   Tobacco Use     Smoking status: Former     Smokeless tobacco: Never   Vaping Use     Vaping status: Never Used   Substance and Sexual Activity     Alcohol use: Yes     Drug use: No     Sexual activity: Yes     Partners: Male     Birth control/protection: None, Post-menopausal,  Female Surgical     Comment: [facility]   Social History Narrative    6/06/23    ENVIRONMENTAL HISTORY: [place] The home is heated with a forced air. They do have central air conditioning. The patient's bedroom is furnished with carpeting in bedroom.  Pets inside the house include 1 dog(s). There is no history of cockroach or mice infestation. There is/are 0 smokers in the house.  The house does not have a damp basement.               Review of Systems   Constitutional: Negative for activity change, chills and fever.   HENT: Positive for postnasal drip and rhinorrhea. Negative for congestion, ear discharge, facial swelling, nosebleeds, sinus pressure and sneezing.         Raspy voice   Eyes: Negative for discharge, redness and itching.        Eye burning   Respiratory: Negative for cough, chest tightness, shortness of breath and wheezing.    Allergic/Immunologic: Positive for environmental allergies.   Neurological: Negative for headaches.           Current Outpatient Medications:      acyclovir (ZOVIRAX) 400 MG tablet, Take 1 tablet (400 mg) by mouth 2 times daily, Disp: 180 tablet, Rfl: 4     ADVAIR DISKUS 500-50 MCG/ACT inhaler, Inhale 1 puff into the lungs every 12 hours, Disp: 60 each, Rfl: 4     albuterol (ACCUNEB) 1.25 MG/3ML neb solution, Take 2 vials (2.5 mg) by nebulization every 4 hours as needed for shortness of breath / dyspnea or wheezing, Disp: 30 vial, Rfl: 4     albuterol (PROAIR HFA/PROVENTIL HFA/VENTOLIN HFA) 108 (90 Base) MCG/ACT inhaler, Inhale 2 puffs into the lungs every 4 hours as needed for shortness of breath or wheezing, Disp: 18 g, Rfl: 2     albuterol (PROVENTIL) (2.5 MG/3ML) 0.083% neb solution, Take 1 vial (2.5 mg) by nebulization every 4 hours as needed for shortness of breath / dyspnea or wheezing, Disp: 120 mL, Rfl: 1     azelastine (ASTELIN) 0.1 % nasal spray, Spray 2 sprays into both nostrils 2 times daily as needed for rhinitis, Disp: 30 mL,  Rfl: 3     Cholecalciferol (VITAMIN D-3 PO), Take 1 tablet by mouth, Disp: , Rfl:      CLARITIN-D 24 HOUR  MG per 24 hr tablet, TK 1 T PO  D, Disp: , Rfl: 0     dexamethasone (DECADRON) 0.1 % ophthalmic solution, Place 1 drop into the right eye every other day, Disp: 5 mL, Rfl: 6     ELIQUIS ANTICOAGULANT 5 MG tablet, Take 1 tablet by mouth 2 times daily, Disp: , Rfl:      EPINEPHrine (AUVI-Q) 0.3 MG/0.3ML injection 2-pack, Inject 0.3 mLs (0.3 mg) into the muscle as needed for anaphylaxis, Disp: 2 each, Rfl: 1     erythromycin (ROMYCIN) 5 MG/GM ophthalmic ointment, Place into both eyes At Bedtime 1 application to each eye at bedtime, Disp: 3.5 g, Rfl: 11     estradiol (HARVEY) 0.025 MG/24HR bi-weekly patch, APPLY 1 PATCH TO THE SKIN TWICE A WEEK, Disp: 24 patch, Rfl: 3     fluconazole (DIFLUCAN) 100 MG tablet, 200 mg loading dose on day one, followed by 100mg by mouth daily for 7 days, Disp: 8 tablet, Rfl: 0     fluticasone (FLONASE) 50 MCG/ACT nasal spray, Spray 2 sprays into both nostrils daily, Disp: 16 g, Rfl: 4     glucagon 1 MG kit, Inject 1mg as needed for anaphylaxis that is non-responding to epinephrine., Disp: 1 mg, Rfl: 0     Hypromellose (ARTIFICIAL TEARS OP), Apply 1 drop to eye as needed Usually every 30 minutes., Disp: , Rfl:      metoprolol tartrate (LOPRESSOR) 25 MG tablet, , Disp: , Rfl:      montelukast (SINGULAIR) 10 MG tablet, Take 1 tablet (10 mg) by mouth At Bedtime, Disp: 30 tablet, Rfl: 11     order for DME, Equipment being ordered: Nebulizer, Disp: 1 Box, Rfl: 0     triamterene-HCTZ (DYAZIDE) 37.5-25 MG capsule, Take 2 capsules by mouth daily As needed for fluid retention, Disp: 180 capsule, Rfl: 3  Immunization History   Administered Date(s) Administered     COVID-19 MONOVALENT 12+ (Pfizer) 03/05/2021, 03/26/2021, 11/08/2021     DTaP, Unspecified 09/05/2014     Pneumococcal 23 valent 10/16/2013     TD,PF 7+ (Tenivac) 04/01/2003     Allergies   Allergen Reactions     Latex Anaphylaxis  "    Xray Dye  [Contrast Dye] Hives     Pt states she had allergic reaction to xray day several years ago (hives) Pt has been successfully premedicated for contrast allergy without problem.      Sulfa Antibiotics      Penicillins Rash     Other reaction(s): Swelling, lips/tongue  Childhood rash     OBJECTIVE:                                                                 /70   Pulse 60   Ht 1.702 m (5' 7\")   Wt 70.9 kg (156 lb 4.9 oz)   SpO2 98%   BMI 24.48 kg/m          Physical Exam  Vitals and nursing note reviewed.   Constitutional:       General: She is not in acute distress.     Appearance: She is not ill-appearing, toxic-appearing or diaphoretic.   HENT:      Head: Normocephalic and atraumatic.      Comments: Raspy voice on today's exam.     Right Ear: Tympanic membrane, ear canal and external ear normal.      Left Ear: Tympanic membrane, ear canal and external ear normal.      Nose: No mucosal edema, congestion or rhinorrhea.      Right Turbinates: Not enlarged, swollen or pale.      Left Turbinates: Not enlarged, swollen or pale.      Mouth/Throat:      Lips: Pink.      Mouth: Mucous membranes are moist.      Pharynx: Oropharynx is clear. No pharyngeal swelling, oropharyngeal exudate, posterior oropharyngeal erythema or uvula swelling.   Eyes:      General:         Right eye: No discharge.         Left eye: No discharge.      Conjunctiva/sclera: Conjunctivae normal.   Cardiovascular:      Rate and Rhythm: Normal rate and regular rhythm.      Heart sounds: Normal heart sounds. No murmur heard.  Pulmonary:      Effort: Pulmonary effort is normal. No respiratory distress.      Breath sounds: Normal breath sounds and air entry. No stridor, decreased air movement or transmitted upper airway sounds. No decreased breath sounds, wheezing, rhonchi or rales.   Musculoskeletal:      Cervical back: Normal range of motion.   Neurological:      Mental Status: She is alert and oriented to person, place, and " time.   Psychiatric:         Mood and Affect: Mood normal.         Behavior: Behavior normal.               WORKUP:     SPIROMETRY       FVC 3.04L (88% of predicted).     FEV1 1.80L (68% of predicted).     FEV1/FVC 59%      I have reviewed and interpreted these results.  The office spirometry performed today suggests mild obstruction.  FVC has improved compared to previous visit.    Asthma Control Test (ACT) total score: 22       ASSESSMENT/PLAN:    Moderate persistent asthma without complication  Hoarseness    Asthma symptoms well controlled.  Spirometry improved after increasing his Symbicort 160/4.5 mcg to 2 puffs twice daily.  Unfortunately, she developed significant hoarseness, which is likely due to increasing the dose of ICS.  - Continue montelukast 10 mg by mouth once daily as is.  - Stop Symbicort.  - Start Advair 500/50 mcg 1 puff twice daily. rinse/gargle/spit water after use    - I will see her back in 10 to 12 weeks to assess asthma and repeat spirometry.    - BREATHING CAPACITY TEST [27208]  - ADVAIR DISKUS 500-50 MCG/ACT inhaler  Dispense: 60 each; Refill: 4  - albuterol (PROAIR HFA/PROVENTIL HFA/VENTOLIN HFA) 108 (90 Base) MCG/ACT inhaler  Dispense: 18 g; Refill: 2          Seasonal allergic rhinitis due to pollen  Allergic rhinitis due to animal dander  Allergic rhinitis due to mold    - Use intranasal fluticasone (Flonase) 1-2 sprays in each nostril once daily.  - Add azelastine nasal spray, 2 sprays in each nostril twice daily as needed.  Would be interesting to see if a combination of intranasal steroid and intranasal antihistamine will help reducing the need in oral decongestant.  - For now, continue with Claritin-D as needed.    - azelastine (ASTELIN) 0.1 % nasal spray  Dispense: 30 mL; Refill: 3        Return in about 10 weeks (around 8/15/2023), or if symptoms worsen or fail to improve.    Thank you for allowing us to participate in the care of this patient. Please feel free to contact us if  there are any questions or concerns about the patient.    Disclaimer: This note consists of symbols derived from keyboarding, dictation and/or voice recognition software. As a result, there may be errors in the script that have gone undetected. Please consider this when interpreting information found in this chart.    Arturo Patel MD, FAAAAI, FACAAI  Allergy, Asthma and Immunology     [facility]

## 2023-06-06 NOTE — PATIENT INSTRUCTIONS
Stop Symbicort.    Start Advair 500/50 mcg 1 puff twice daily. rinse/gargle/spit water after use   -Continue using albuterol inhaler 2-4 puffs every 4 hours as needed for chest tightness/wheezing/shortness of breath/persistent cough.  -Use it with spacer/chamber device.     -Use fluticasone (Flonase) 1-2 sprays in each nostril once daily.  -Add azelastine nasal spray, 2 sprays in each nostril twice daily as needed.     Ok, to continue Claritin D as needed for now.           Dr Patel Scheduling:  [facility] (Tues / Wed) appointment line: [phone]  [facility] allergy shot room: [phone]  [facility] (Thurs / Fri) appointment line: [phone]    Pulmonary Function Scheduling:  [place] - [phone]  [facility] [phone]  [place] - [phone]     Prescription Assistance  If you need assistance with your prescriptions (cost, coverage, etc) please contact: [facility] [phone]

## 2023-08-08 ENCOUNTER — OFFICE VISIT (OUTPATIENT)
Dept: ALLERGY | Facility: OTHER | Age: 68
End: 2023-08-08
Payer: COMMERCIAL

## 2023-08-08 ENCOUNTER — TRANSFERRED RECORDS (OUTPATIENT)
Dept: HEALTH INFORMATION MANAGEMENT | Facility: CLINIC | Age: 68
End: 2023-08-08

## 2023-08-08 VITALS
HEART RATE: 64 BPM | WEIGHT: 157.4 LBS | DIASTOLIC BLOOD PRESSURE: 71 MMHG | SYSTOLIC BLOOD PRESSURE: 119 MMHG | OXYGEN SATURATION: 98 % | BODY MASS INDEX: 24.65 KG/M2

## 2023-08-08 DIAGNOSIS — J30.89 ALLERGIC RHINITIS DUE TO MOLD: ICD-10-CM

## 2023-08-08 DIAGNOSIS — J45.40 MODERATE PERSISTENT ASTHMA WITHOUT COMPLICATION: Primary | ICD-10-CM

## 2023-08-08 DIAGNOSIS — H61.21 IMPACTED CERUMEN OF RIGHT EAR: ICD-10-CM

## 2023-08-08 DIAGNOSIS — J30.81 ALLERGIC RHINITIS DUE TO ANIMAL DANDER: ICD-10-CM

## 2023-08-08 DIAGNOSIS — J30.1 SEASONAL ALLERGIC RHINITIS DUE TO POLLEN: ICD-10-CM

## 2023-08-08 LAB
FEF 25/75: NORMAL
FEV-1: NORMAL
FEV1/FVC: NORMAL
FVC: NORMAL

## 2023-08-08 PROCEDURE — 94010 BREATHING CAPACITY TEST: CPT | Performed by: ALLERGY & IMMUNOLOGY

## 2023-08-08 PROCEDURE — 99214 OFFICE O/P EST MOD 30 MIN: CPT | Mod: 25 | Performed by: ALLERGY & IMMUNOLOGY

## 2023-08-08 RX ORDER — BUDESONIDE AND FORMOTEROL FUMARATE DIHYDRATE 160; 4.5 UG/1; UG/1
2 AEROSOL RESPIRATORY (INHALATION) 2 TIMES DAILY
Qty: 10.2 G | Refills: 11 | Status: SHIPPED | OUTPATIENT
Start: 2023-08-08 | End: 2024-02-13

## 2023-08-08 RX ORDER — CETIRIZINE HYDROCHLORIDE 10 MG/1
10 TABLET ORAL DAILY
COMMUNITY

## 2023-08-08 ASSESSMENT — ENCOUNTER SYMPTOMS
HEADACHES: 0
EYE REDNESS: 0
VOMITING: 0
FEVER: 0
CHILLS: 0
EYE DISCHARGE: 0
DIARRHEA: 0
SINUS PRESSURE: 0
RHINORRHEA: 0
NAUSEA: 0
CHEST TIGHTNESS: 0
JOINT SWELLING: 0
ARTHRALGIAS: 0
ADENOPATHY: 0
MYALGIAS: 0
FACIAL SWELLING: 0
WHEEZING: 0
COUGH: 0
SHORTNESS OF BREATH: 0
ACTIVITY CHANGE: 0
EYE ITCHING: 0

## 2023-08-08 ASSESSMENT — ASTHMA QUESTIONNAIRES: ACT_TOTALSCORE: 24

## 2023-08-08 NOTE — PROGRESS NOTES
SUBJECTIVE:                                                                   Heather Wilkinson presents today to our Allergy Clinic at [facility] for a follow up visit. She is a 67 year old female with history of asthma, food allergy, and allergic rhinitis.     Problem   Moderate Persistent Asthma Without Complication    History of moderate persistent asthma for years. Was hospitalized as a child. Stayed in ICU but no history of intubations for asthma flares. She smoked 1/2ppd for 20 years. Stopped in her 50's Has a history of recurrent thrush with ICS-containing medications.  In 2020, serum IgE was 330 KiU/L (0-114).  CBC with differential was within normal limits. Absolute eosinophil count was 300. Spirometry in March 2020 suggested mild obstruction without postbronchodilator reversibility.  In March 2023, the office spirometry suggested mild to moderate obstruction with postbronchodilator response in FVC.     Food Allergy    Had IgE mediated symptoms with avocado, banana, and latex, manifested by throat closing, lip angioedema, and hives. Required epi in the past. In the past, positive serum IgE for avocado and latex.     Seasonal Allergic Rhinitis Due to Pollen    History of allergic rhinoconjunctivitis symptoms. Was on allergen immunotherapy in the past, many years ago. Stopped immunotherapy secondary to systemic reactions.  In 2020, serum IgE for regional aeroallergen panel showed sensitivity to molds, cat, dog, grass pollen, and weed pollen.        Emma reports that her current asthma symptoms are well controlled with Symbicort 160/4.5 mcg 2 puffs twice daily, montelukast 10 mg by mouth once daily at bedtime, and albuterol as needed. She uses albuterol HFA  less than twice weekly for rescue from chest symptoms. The patient denies current chest tightness, cough, wheezing, or shortness of breath. She hasn't switched to Advair (to see if that helps with raspy voice).  Emma denies  current chest tightness, cough, wheezing, or shortness of breath. She denies experiencing any nocturnal awakenings or interval episodes of thrush.    Regarding allergic rhinoconjunctivitis, she uses intranasal fluticasone 1 spray in each nostril once daily. She has discontinued Claritin D and switched to plain cetirizine 10 mg by mouth once daily. She experiences occasional nasal stuffiness but not on a daily basis. The patient hasn't used azelastine spray yet.       She denies known exposures to avocado or latex.    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     Allergic rhinitis due to mold     Allergic rhinitis due to animal dander     Thrush     Latex allergy     H/O: hysterectomy       Past Medical History:   Diagnosis Date     Allergic rhinitis      Asthma      Atrial fibrillation (H)      Cough      Fluid retention      Tachycardia       Problem (# of Occurrences) Relation (Name,Age of Onset)    No Known Problems (9) Mother, Father, Sister, Brother, Maternal Grandmother, Maternal Grandfather, Paternal Grandmother, Paternal Grandfather, Other       Negative family history of: Glaucoma, Macular Degeneration        Past Surgical History:   Procedure Laterality Date     CYSTOSCOPY  5/11/2012    Procedure:CYSTOSCOPY; Surgeon:RADHA COMER; Location:[facility]     GYN SURGERY      csection, infertility, hyst     HYSTERECTOMY  1992     HYSTERECTOMY, PAP NO LONGER INDICATED       SLING TRANSVAGINAL  5/11/2012    Procedure:SLING TRANSVAGINAL; mini arc sling  and cystoscopy; Surgeon:RADHA COMER; Location:[facility]     Social History     Socioeconomic History     Marital status:      Spouse name: None     Number of children: 4     Years of education: None     Highest education level: None   Occupational History     Comment: retired   Tobacco Use      Smoking status: Former     Smokeless tobacco: Never   Vaping Use     Vaping Use: Never used   Substance and Sexual Activity     Alcohol use: Yes     Drug use: No     Sexual activity: Yes     Partners: Male     Birth control/protection: None, Post-menopausal, Female Surgical     Comment: [facility]   Social History Narrative    August 8, 2023        ENVIRONMENTAL HISTORY: [place] The home is heated with a forced air. They do have central air conditioning. The patient's bedroom is furnished with carpeting in bedroom.  Pets inside the house include 1 dog(s). There is no history of cockroach or mice infestation. There is/are 0 smokers in the house.  The house does not have a damp basement.               Review of Systems   Constitutional:  Negative for activity change, chills and fever.   HENT:  Negative for congestion, dental problem, ear pain, facial swelling, nosebleeds, postnasal drip, rhinorrhea, sinus pressure and sneezing.    Eyes:  Negative for discharge, redness and itching.   Respiratory:  Negative for cough, chest tightness, shortness of breath and wheezing.    Cardiovascular:  Negative for chest pain.   Gastrointestinal:  Negative for diarrhea, nausea and vomiting.   Musculoskeletal:  Negative for arthralgias, joint swelling and myalgias.   Skin:  Negative for rash.   Neurological:  Negative for headaches.   Hematological:  Negative for adenopathy.   Psychiatric/Behavioral:  Negative for behavioral problems and self-injury.            Current Outpatient Medications:      albuterol (PROAIR HFA/PROVENTIL HFA/VENTOLIN HFA) 108 (90 Base) MCG/ACT inhaler, Inhale 2 puffs into the lungs every 4 hours as needed for shortness of breath or wheezing, Disp: 18 g, Rfl: 2     albuterol (PROVENTIL) (2.5 MG/3ML) 0.083% neb solution, Take 1 vial (2.5 mg) by nebulization every 4 hours as needed for shortness of breath / dyspnea or wheezing, Disp: 120 mL, Rfl: 1     azelastine (ASTELIN)  0.1 % nasal spray, Spray 2 sprays into both nostrils 2 times daily as needed for rhinitis, Disp: 30 mL, Rfl: 3     cetirizine (ZYRTEC) 10 MG tablet, Take 10 mg by mouth daily, Disp: , Rfl:      fluticasone (FLONASE) 50 MCG/ACT nasal spray, Spray 2 sprays into both nostrils daily, Disp: 16 g, Rfl: 4     montelukast (SINGULAIR) 10 MG tablet, Take 1 tablet (10 mg) by mouth At Bedtime, Disp: 30 tablet, Rfl: 11     SYMBICORT 160-4.5 MCG/ACT Inhaler, Inhale 2 puffs into the lungs 2 times daily, Disp: 10.2 g, Rfl: 11     acyclovir (ZOVIRAX) 400 MG tablet, Take 1 tablet (400 mg) by mouth 2 times daily, Disp: 180 tablet, Rfl: 4     ADVAIR DISKUS 500-50 MCG/ACT inhaler, Inhale 1 puff into the lungs every 12 hours (Patient not taking: Reported on 8/8/2023), Disp: 60 each, Rfl: 4     Cholecalciferol (VITAMIN D-3 PO), Take 1 tablet by mouth, Disp: , Rfl:      CLARITIN-D 24 HOUR  MG per 24 hr tablet, TK 1 T PO  D (Patient not taking: Reported on 8/8/2023), Disp: , Rfl: 0     dexamethasone (DECADRON) 0.1 % ophthalmic solution, Place 1 drop into the right eye every other day, Disp: 5 mL, Rfl: 6     ELIQUIS ANTICOAGULANT 5 MG tablet, Take 1 tablet by mouth 2 times daily, Disp: , Rfl:      EPINEPHrine (AUVI-Q) 0.3 MG/0.3ML injection 2-pack, Inject 0.3 mLs (0.3 mg) into the muscle as needed for anaphylaxis, Disp: 2 each, Rfl: 1     erythromycin (ROMYCIN) 5 MG/GM ophthalmic ointment, Place into both eyes At Bedtime 1 application to each eye at bedtime, Disp: 3.5 g, Rfl: 11     estradiol (HARVEY) 0.025 MG/24HR bi-weekly patch, APPLY 1 PATCH TO THE SKIN TWICE A WEEK, Disp: 24 patch, Rfl: 3     fluconazole (DIFLUCAN) 100 MG tablet, 200 mg loading dose on day one, followed by 100mg by mouth daily for 7 days, Disp: 8 tablet, Rfl: 0     glucagon 1 MG kit, Inject 1mg as needed for anaphylaxis that is non-responding to epinephrine., Disp: 1 mg, Rfl: 0     Hypromellose (ARTIFICIAL TEARS OP), Apply 1 drop to eye as needed Usually every 30  minutes., Disp: , Rfl:      metoprolol tartrate (LOPRESSOR) 25 MG tablet, , Disp: , Rfl:      order for DME, Equipment being ordered: Nebulizer, Disp: 1 Box, Rfl: 0     triamterene-HCTZ (DYAZIDE) 37.5-25 MG capsule, Take 2 capsules by mouth daily As needed for fluid retention, Disp: 180 capsule, Rfl: 3  Immunization History   Administered Date(s) Administered     COVID-19 MONOVALENT 12+ (Pfizer) 03/05/2021, 03/26/2021, 11/08/2021     DTaP, Unspecified 09/05/2014     Pneumococcal 23 valent 10/16/2013     TD,PF 7+ (Tenivac) 04/01/2003     Allergies   Allergen Reactions     Latex Anaphylaxis     Xray Dye  [Contrast Dye] Hives     Pt states she had allergic reaction to xray day several years ago (hives) Pt has been successfully premedicated for contrast allergy without problem.      Avocado      Banana      Sulfa Antibiotics      Penicillins Rash     Other reaction(s): Swelling, lips/tongue  Childhood rash     OBJECTIVE:                                                                 /71   Pulse 64   Wt 71.4 kg (157 lb 6.4 oz)   SpO2 98%   BMI 24.65 kg/m          Physical Exam  Vitals and nursing note reviewed.   Constitutional:       General: She is not in acute distress.     Appearance: She is not ill-appearing, toxic-appearing or diaphoretic.   HENT:      Head: Normocephalic and atraumatic.      Right Ear: Tympanic membrane normal. There is impacted cerumen.      Left Ear: Tympanic membrane, ear canal and external ear normal.      Nose: No mucosal edema, congestion or rhinorrhea.      Right Turbinates: Not enlarged, swollen or pale.      Left Turbinates: Not enlarged, swollen or pale.      Mouth/Throat:      Lips: Pink.      Mouth: Mucous membranes are moist.      Pharynx: Oropharynx is clear. No pharyngeal swelling, oropharyngeal exudate, posterior oropharyngeal erythema or uvula swelling.   Eyes:      General:         Right eye: No discharge.         Left eye: No discharge.      Conjunctiva/sclera:  Conjunctivae normal.   Cardiovascular:      Rate and Rhythm: Normal rate and regular rhythm.      Heart sounds: Normal heart sounds. No murmur heard.  Pulmonary:      Effort: Pulmonary effort is normal. No respiratory distress.      Breath sounds: Normal breath sounds and air entry. No stridor, decreased air movement or transmitted upper airway sounds. No decreased breath sounds, wheezing, rhonchi or rales.   Musculoskeletal:      Cervical back: Normal range of motion.   Neurological:      Mental Status: She is alert and oriented to person, place, and time.   Psychiatric:         Mood and Affect: Mood normal.         Behavior: Behavior normal.         WORKUP:  SPIROMETRY       FVC 2.72L (78% of predicted).     FEV1 1.83L (69% of predicted).     FEV1/FVC 67%      I have reviewed and interpreted these results.  The office spirometry performed today suggests mild obstruction.  FVC is somewhat decreased when compared to previous visit.  Asthma Control Test (ACT) total score: 24      ASSESSMENT/PLAN:    Moderate persistent asthma without complication  Well controlled with Symbicort 160/4.5 mcg 2 puffs twice daily, montelukast 10 mg by mouth once daily at bedtime, and albuterol as needed.    -Continue as is.- BREATHING CAPACITY TEST [33560]  - SYMBICORT 160-4.5 MCG/ACT Inhaler  Dispense: 10.2 g; Refill: 11    Seasonal allergic rhinitis due to pollen  Allergic rhinitis due to mold  Allergic rhinitis due to animal dander  Well controlled.   -Use Flonase 1-2 sprays/each nostril once a day.  -Use azelastine 2 sprays in each nostril twice a day when necessary.  - Continue with cetirizine 10 mg by mouth once daily until the first frost and then as needed.     Impacted cerumen of right ear  Use Debrox (5 drops in the right ear for 5 days at bedtime)       Return in about 6 months (around 2/8/2024), or if symptoms worsen or fail to improve.    Thank you for allowing us to participate in the care of this patient. Please feel free  to contact us if there are any questions or concerns about the patient.    Disclaimer: This note consists of symbols derived from keyboarding, dictation and/or voice recognition software. As a result, there may be errors in the script that have gone undetected. Please consider this when interpreting information found in this chart.    Arturo Patel MD, FAAAAI, FACAAI  Allergy, Asthma and Immunology     [facility]

## 2023-08-08 NOTE — PATIENT INSTRUCTIONS
Symbicort 2 puffs twice daily  Montelukast 10 mg by mouth at bedtime.   -Continue using albuterol inhaler 2-4 puffs every 4 hours as needed for chest tightness/wheezing/shortness of breath/persistent cough.      -start Flonase 1-2 sprays/each nostril once a day.  -Use azelastine 2 sprays in each nostril twice a day when necessary.  Cetirizine 10 mg by mouth once daily until the first first and then as needed.     Call us when you can stop cetirizine, so we cold schedule venom testing in December-February.     Start Debrox 5 drops in the right ear for 5 days at at bedtime.

## 2023-08-08 NOTE — LETTER
8/8/2023         RE: Heather Wilkinson  [id number] [facility] [address]        Dear Colleague,    Thank you for referring your patient, Heather Wilkinson, to the [facility]. Please see a copy of my visit note below.    SUBJECTIVE:                                                                   Heather Wilkinson presents today to our Allergy Clinic at [facility] for a follow up visit. She is a 67 year old female with history of asthma, food allergy, and allergic rhinitis.     Problem   Moderate Persistent Asthma Without Complication    History of moderate persistent asthma for years. Was hospitalized as a child. Stayed in ICU but no history of intubations for asthma flares. She smoked 1/2ppd for 20 years. Stopped in her 50's Has a history of recurrent thrush with ICS-containing medications.  In 2020, serum IgE was 330 KiU/L (0-114).  CBC with differential was within normal limits. Absolute eosinophil count was 300. Spirometry in March 2020 suggested mild obstruction without postbronchodilator reversibility.  In March 2023, the office spirometry suggested mild to moderate obstruction with postbronchodilator response in FVC.     Food Allergy    Had IgE mediated symptoms with avocado, banana, and latex, manifested by throat closing, lip angioedema, and hives. Required epi in the past. In the past, positive serum IgE for avocado and latex.     Seasonal Allergic Rhinitis Due to Pollen    History of allergic rhinoconjunctivitis symptoms. Was on allergen immunotherapy in the past, many years ago. Stopped immunotherapy secondary to systemic reactions.  In 2020, serum IgE for regional aeroallergen panel showed sensitivity to molds, cat, dog, grass pollen, and weed pollen.        Emma reports that her current asthma symptoms are well controlled with Symbicort 160/4.5 mcg 2 puffs twice daily, montelukast 10 mg by mouth once daily at bedtime, and albuterol as  needed. She uses albuterol HFA  less than twice weekly for rescue from chest symptoms. The patient denies current chest tightness, cough, wheezing, or shortness of breath. She hasn't switched to Advair (to see if that helps with raspy voice).  Emma denies current chest tightness, cough, wheezing, or shortness of breath. She denies experiencing any nocturnal awakenings or interval episodes of thrush.    Regarding allergic rhinoconjunctivitis, she uses intranasal fluticasone 1 spray in each nostril once daily. She has discontinued Claritin D and switched to plain cetirizine 10 mg by mouth once daily. She experiences occasional nasal stuffiness but not on a daily basis. The patient hasn't used azelastine spray yet.       She denies known exposures to avocado or latex.    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     Allergic rhinitis due to mold     Allergic rhinitis due to animal dander     Thrush     Latex allergy     H/O: hysterectomy       Past Medical History:   Diagnosis Date     Allergic rhinitis      Asthma      Atrial fibrillation (H)      Cough      Fluid retention      Tachycardia       Problem (# of Occurrences) Relation (Name,Age of Onset)    No Known Problems (9) Mother, Father, Sister, Brother, Maternal Grandmother, Maternal Grandfather, Paternal Grandmother, Paternal Grandfather, Other       Negative family history of: Glaucoma, Macular Degeneration        Past Surgical History:   Procedure Laterality Date     CYSTOSCOPY  5/11/2012    Procedure:CYSTOSCOPY; Surgeon:RADHA COMER; Location:[facility]     GYN SURGERY      csection, infertility, hyst     HYSTERECTOMY  1992     HYSTERECTOMY, PAP NO LONGER INDICATED       SLING TRANSVAGINAL  5/11/2012    Procedure:SLING TRANSVAGINAL; mini arc sling  and cystoscopy; Surgeon:RADHA COMER;  Location:[facility]     Social History     Socioeconomic History     Marital status:      Spouse name: None     Number of children: 4     Years of education: None     Highest education level: None   Occupational History     Comment: retired   Tobacco Use     Smoking status: Former     Smokeless tobacco: Never   Vaping Use     Vaping Use: Never used   Substance and Sexual Activity     Alcohol use: Yes     Drug use: No     Sexual activity: Yes     Partners: Male     Birth control/protection: None, Post-menopausal, Female Surgical     Comment: [facility]   Social History Narrative    August 8, 2023        ENVIRONMENTAL HISTORY: [place] The home is heated with a forced air. They do have central air conditioning. The patient's bedroom is furnished with carpeting in bedroom.  Pets inside the house include 1 dog(s). There is no history of cockroach or mice infestation. There is/are 0 smokers in the house.  The house does not have a damp basement.               Review of Systems   Constitutional:  Negative for activity change, chills and fever.   HENT:  Negative for congestion, dental problem, ear pain, facial swelling, nosebleeds, postnasal drip, rhinorrhea, sinus pressure and sneezing.    Eyes:  Negative for discharge, redness and itching.   Respiratory:  Negative for cough, chest tightness, shortness of breath and wheezing.    Cardiovascular:  Negative for chest pain.   Gastrointestinal:  Negative for diarrhea, nausea and vomiting.   Musculoskeletal:  Negative for arthralgias, joint swelling and myalgias.   Skin:  Negative for rash.   Neurological:  Negative for headaches.   Hematological:  Negative for adenopathy.   Psychiatric/Behavioral:  Negative for behavioral problems and self-injury.            Current Outpatient Medications:      albuterol (PROAIR HFA/PROVENTIL HFA/VENTOLIN HFA) 108 (90 Base) MCG/ACT inhaler, Inhale 2 puffs into the lungs every 4 hours as needed for  shortness of breath or wheezing, Disp: 18 g, Rfl: 2     albuterol (PROVENTIL) (2.5 MG/3ML) 0.083% neb solution, Take 1 vial (2.5 mg) by nebulization every 4 hours as needed for shortness of breath / dyspnea or wheezing, Disp: 120 mL, Rfl: 1     azelastine (ASTELIN) 0.1 % nasal spray, Spray 2 sprays into both nostrils 2 times daily as needed for rhinitis, Disp: 30 mL, Rfl: 3     cetirizine (ZYRTEC) 10 MG tablet, Take 10 mg by mouth daily, Disp: , Rfl:      fluticasone (FLONASE) 50 MCG/ACT nasal spray, Spray 2 sprays into both nostrils daily, Disp: 16 g, Rfl: 4     montelukast (SINGULAIR) 10 MG tablet, Take 1 tablet (10 mg) by mouth At Bedtime, Disp: 30 tablet, Rfl: 11     SYMBICORT 160-4.5 MCG/ACT Inhaler, Inhale 2 puffs into the lungs 2 times daily, Disp: 10.2 g, Rfl: 11     acyclovir (ZOVIRAX) 400 MG tablet, Take 1 tablet (400 mg) by mouth 2 times daily, Disp: 180 tablet, Rfl: 4     ADVAIR DISKUS 500-50 MCG/ACT inhaler, Inhale 1 puff into the lungs every 12 hours (Patient not taking: Reported on 8/8/2023), Disp: 60 each, Rfl: 4     Cholecalciferol (VITAMIN D-3 PO), Take 1 tablet by mouth, Disp: , Rfl:      CLARITIN-D 24 HOUR  MG per 24 hr tablet, TK 1 T PO  D (Patient not taking: Reported on 8/8/2023), Disp: , Rfl: 0     dexamethasone (DECADRON) 0.1 % ophthalmic solution, Place 1 drop into the right eye every other day, Disp: 5 mL, Rfl: 6     ELIQUIS ANTICOAGULANT 5 MG tablet, Take 1 tablet by mouth 2 times daily, Disp: , Rfl:      EPINEPHrine (AUVI-Q) 0.3 MG/0.3ML injection 2-pack, Inject 0.3 mLs (0.3 mg) into the muscle as needed for anaphylaxis, Disp: 2 each, Rfl: 1     erythromycin (ROMYCIN) 5 MG/GM ophthalmic ointment, Place into both eyes At Bedtime 1 application to each eye at bedtime, Disp: 3.5 g, Rfl: 11     estradiol (HARVEY) 0.025 MG/24HR bi-weekly patch, APPLY 1 PATCH TO THE SKIN TWICE A WEEK, Disp: 24 patch, Rfl: 3     fluconazole (DIFLUCAN) 100 MG tablet, 200 mg loading dose on day one, followed  by 100mg by mouth daily for 7 days, Disp: 8 tablet, Rfl: 0     glucagon 1 MG kit, Inject 1mg as needed for anaphylaxis that is non-responding to epinephrine., Disp: 1 mg, Rfl: 0     Hypromellose (ARTIFICIAL TEARS OP), Apply 1 drop to eye as needed Usually every 30 minutes., Disp: , Rfl:      metoprolol tartrate (LOPRESSOR) 25 MG tablet, , Disp: , Rfl:      order for DME, Equipment being ordered: Nebulizer, Disp: 1 Box, Rfl: 0     triamterene-HCTZ (DYAZIDE) 37.5-25 MG capsule, Take 2 capsules by mouth daily As needed for fluid retention, Disp: 180 capsule, Rfl: 3  Immunization History   Administered Date(s) Administered     COVID-19 MONOVALENT 12+ (Pfizer) 03/05/2021, 03/26/2021, 11/08/2021     DTaP, Unspecified 09/05/2014     Pneumococcal 23 valent 10/16/2013     TD,PF 7+ (Tenivac) 04/01/2003     Allergies   Allergen Reactions     Latex Anaphylaxis     Xray Dye  [Contrast Dye] Hives     Pt states she had allergic reaction to xray day several years ago (hives) Pt has been successfully premedicated for contrast allergy without problem.      Avocado      Banana      Sulfa Antibiotics      Penicillins Rash     Other reaction(s): Swelling, lips/tongue  Childhood rash     OBJECTIVE:                                                                 /71   Pulse 64   Wt 71.4 kg (157 lb 6.4 oz)   SpO2 98%   BMI 24.65 kg/m          Physical Exam  Vitals and nursing note reviewed.   Constitutional:       General: She is not in acute distress.     Appearance: She is not ill-appearing, toxic-appearing or diaphoretic.   HENT:      Head: Normocephalic and atraumatic.      Right Ear: Tympanic membrane normal. There is impacted cerumen.      Left Ear: Tympanic membrane, ear canal and external ear normal.      Nose: No mucosal edema, congestion or rhinorrhea.      Right Turbinates: Not enlarged, swollen or pale.      Left Turbinates: Not enlarged, swollen or pale.      Mouth/Throat:      Lips: Pink.      Mouth: Mucous  membranes are moist.      Pharynx: Oropharynx is clear. No pharyngeal swelling, oropharyngeal exudate, posterior oropharyngeal erythema or uvula swelling.   Eyes:      General:         Right eye: No discharge.         Left eye: No discharge.      Conjunctiva/sclera: Conjunctivae normal.   Cardiovascular:      Rate and Rhythm: Normal rate and regular rhythm.      Heart sounds: Normal heart sounds. No murmur heard.  Pulmonary:      Effort: Pulmonary effort is normal. No respiratory distress.      Breath sounds: Normal breath sounds and air entry. No stridor, decreased air movement or transmitted upper airway sounds. No decreased breath sounds, wheezing, rhonchi or rales.   Musculoskeletal:      Cervical back: Normal range of motion.   Neurological:      Mental Status: She is alert and oriented to person, place, and time.   Psychiatric:         Mood and Affect: Mood normal.         Behavior: Behavior normal.         WORKUP:  SPIROMETRY       FVC 2.72L (78% of predicted).     FEV1 1.83L (69% of predicted).     FEV1/FVC 67%      I have reviewed and interpreted these results.  The office spirometry performed today suggests mild obstruction.  FVC is somewhat decreased when compared to previous visit.  Asthma Control Test (ACT) total score: 24      ASSESSMENT/PLAN:    Moderate persistent asthma without complication  Well controlled with Symbicort 160/4.5 mcg 2 puffs twice daily, montelukast 10 mg by mouth once daily at bedtime, and albuterol as needed.    -Continue as is.- BREATHING CAPACITY TEST [25563]  - SYMBICORT 160-4.5 MCG/ACT Inhaler  Dispense: 10.2 g; Refill: 11    Seasonal allergic rhinitis due to pollen  Allergic rhinitis due to mold  Allergic rhinitis due to animal dander  Well controlled.   -Use Flonase 1-2 sprays/each nostril once a day.  -Use azelastine 2 sprays in each nostril twice a day when necessary.  - Continue with cetirizine 10 mg by mouth once daily until the first frost and then as needed.      Impacted cerumen of right ear  Use Debrox (5 drops in the right ear for 5 days at bedtime)       Return in about 6 months (around 2/8/2024), or if symptoms worsen or fail to improve.    Thank you for allowing us to participate in the care of this patient. Please feel free to contact us if there are any questions or concerns about the patient.    Disclaimer: This note consists of symbols derived from keyboarding, dictation and/or voice recognition software. As a result, there may be errors in the script that have gone undetected. Please consider this when interpreting information found in this chart.    Arturo Patel MD, FAAAAI, FACAAI  Allergy, Asthma and Immunology     [facility]        Again, thank you for allowing me to participate in the care of your patient.        Sincerely,        Arturo Patel MD

## 2024-02-13 ENCOUNTER — OFFICE VISIT (OUTPATIENT)
Dept: ALLERGY | Facility: OTHER | Age: 69
End: 2024-02-13
Payer: COMMERCIAL

## 2024-02-13 VITALS
OXYGEN SATURATION: 99 % | BODY MASS INDEX: 25.95 KG/M2 | HEIGHT: 64 IN | HEART RATE: 61 BPM | SYSTOLIC BLOOD PRESSURE: 126 MMHG | RESPIRATION RATE: 16 BRPM | WEIGHT: 152 LBS | DIASTOLIC BLOOD PRESSURE: 73 MMHG

## 2024-02-13 DIAGNOSIS — Z91.018 FOOD ALLERGY: ICD-10-CM

## 2024-02-13 DIAGNOSIS — J30.81 ALLERGIC RHINITIS DUE TO ANIMAL DANDER: ICD-10-CM

## 2024-02-13 DIAGNOSIS — T63.481A ALLERGIC REACTION TO HYMENOPTERA VENOM: ICD-10-CM

## 2024-02-13 DIAGNOSIS — J30.89 ALLERGIC RHINITIS CAUSED BY MOLD: ICD-10-CM

## 2024-02-13 DIAGNOSIS — J45.40 MODERATE PERSISTENT ASTHMA WITHOUT COMPLICATION: Primary | ICD-10-CM

## 2024-02-13 DIAGNOSIS — J30.1 SEASONAL ALLERGIC RHINITIS DUE TO POLLEN: ICD-10-CM

## 2024-02-13 DIAGNOSIS — J30.89 ALLERGIC RHINITIS DUE TO MOLD: ICD-10-CM

## 2024-02-13 PROCEDURE — 99214 OFFICE O/P EST MOD 30 MIN: CPT | Performed by: ALLERGY & IMMUNOLOGY

## 2024-02-13 RX ORDER — EPINEPHRINE 0.3 MG/.3ML
0.3 INJECTION SUBCUTANEOUS PRN
Qty: 2 EACH | Refills: 3 | Status: SHIPPED | OUTPATIENT
Start: 2024-02-13

## 2024-02-13 RX ORDER — MONTELUKAST SODIUM 10 MG/1
10 TABLET ORAL AT BEDTIME
Qty: 30 TABLET | Refills: 11 | Status: SHIPPED | OUTPATIENT
Start: 2024-02-13

## 2024-02-13 RX ORDER — BUDESONIDE AND FORMOTEROL FUMARATE DIHYDRATE 160; 4.5 UG/1; UG/1
2 AEROSOL RESPIRATORY (INHALATION) 2 TIMES DAILY
COMMUNITY

## 2024-02-13 RX ORDER — BUDESONIDE AND FORMOTEROL FUMARATE DIHYDRATE 160; 4.5 UG/1; UG/1
2 AEROSOL RESPIRATORY (INHALATION) 2 TIMES DAILY
Qty: 10.2 G | Refills: 11 | Status: SHIPPED | OUTPATIENT
Start: 2024-02-13

## 2024-02-13 RX ORDER — FLUTICASONE PROPIONATE 50 MCG
1-2 SPRAY, SUSPENSION (ML) NASAL DAILY
Qty: 16 G | Refills: 4 | Status: SHIPPED | OUTPATIENT
Start: 2024-02-13

## 2024-02-13 ASSESSMENT — PAIN SCALES - GENERAL: PAINLEVEL: NO PAIN (0)

## 2024-02-13 ASSESSMENT — ASTHMA QUESTIONNAIRES: ACT_TOTALSCORE: 24

## 2024-02-13 NOTE — PATIENT INSTRUCTIONS
Dr Patel [phone]    All visits for food challenges, venom allergy testing, and medication/drug challenges MUST be scheduled through the allergy clinic nurse. Please send a [employer] message or call the allergy scheduling line and ask to speak with Dr Patel's team for scheduling these appointments. Appointments for these visits that are made through the schedulers or via [facility] may be cancelled or rescheduled.    Allergy Shot Room ([place]): [phone]    Pulmonary Function [phone]    Prescription Assistance  If you need assistance with your prescriptions (cost, coverage, etc) please contact: [place] [facility] [phone]

## 2024-02-13 NOTE — LETTER
2/13/2024         RE: Heather Wilkinson  [address] [address] [facility] [address]        Dear Colleague,    Thank you for referring your patient, Heather Wilkinson, to the [facility]. Please see a copy of my visit note below.    SUBJECTIVE:                                                                   Heather Wilkinson presents today to our Allergy Clinic at [facility] for a follow up visit. She is a 68 year old female with history of asthma, food allergy, and allergic rhinitis. .  Problem   Moderate Persistent Asthma Without Complication    History of moderate persistent asthma for years. Was hospitalized as a child. Stayed in ICU but no history of intubations for asthma flares. She smoked 1/2ppd for 20 years. Stopped in her 50's Has a history of recurrent thrush with ICS-containing medications.  In 2020, serum IgE was 330 KiU/L (0-114).  CBC with differential was within normal limits. Absolute eosinophil count was 300. Spirometry in March 2020 suggested mild obstruction without postbronchodilator reversibility.  In March 2023, the office spirometry suggested mild to moderate obstruction with postbronchodilator response in FVC.     Food Allergy    Had IgE mediated symptoms with avocado, banana, and latex, manifested by throat closing, lip angioedema, and hives. Required epi in the past. In the past, positive serum IgE for avocado and latex.     Seasonal Allergic Rhinitis Due to Pollen    History of allergic rhinoconjunctivitis symptoms. Was on allergen immunotherapy in the past, many years ago. Stopped immunotherapy secondary to systemic reactions.  In 2020, serum IgE for regional aeroallergen panel showed sensitivity to molds, cat, dog, grass pollen, and weed pollen.     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  In 2020, serum IgE for  venom was negative.          Emma reports that her current asthma symptoms are well controlled with Symbicort 160/4.5 mcg 2 puffs twice daily, montelukast 10 mg by mouth once daily at bedtime, and albuterol as needed. She uses albuterol HFA less than twice weekly for rescue from chest symptoms. One month ago, was exposed to a lot of chlorine at the [employer] pool. Developed cough and shortness of breath. Had to be seen in  and was prescribed prednisone.  Besides that episode, her asthma symptoms have been well-controlled. Emma denies current chest tightness, cough, wheezing, or shortness of breath.    Regarding allergic rhinoconjunctivitis, she uses intranasal fluticasone 1-2 sprays in each nostril once daily and takes cetirizine 10 mg by mouth once daily. She uses azelastine as needed.  In January, she had a sinus infection that was treated with doxycycline. States that doxycycline cleared the infection a couple of days after she started taking it.      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     Allergic rhinitis due to mold     Allergic rhinitis due to animal dander     Thrush     Latex allergy     H/O: hysterectomy       Past Medical History:   Diagnosis Date     Allergic rhinitis      Asthma      Atrial fibrillation (H)      Cough      Fluid retention      Tachycardia       Problem (# of Occurrences) Relation (Name,Age of Onset)    No Known Problems (9) Mother, Father, Sister, Brother, Maternal Grandmother, Maternal Grandfather, Paternal Grandmother, Paternal Grandfather, Other           Negative family history of: Glaucoma, Macular Degeneration          Past Surgical History:   Procedure Laterality Date     CYSTOSCOPY  5/11/2012    Procedure:CYSTOSCOPY; Surgeon:RADHA COMER; Location:[facility]     GYN SURGERY      csection, infertility, hyst      HYSTERECTOMY  1992     HYSTERECTOMY, PAP NO LONGER INDICATED       SLING TRANSVAGINAL  5/11/2012    Procedure:SLING TRANSVAGINAL; mini arc sling  and cystoscopy; Surgeon:RADHA COMER; Location:[facility]     Social History     Socioeconomic History     Marital status:      Spouse name: None     Number of children: 4     Years of education: None     Highest education level: None   Occupational History     Comment: retired   Tobacco Use     Smoking status: Former     Smokeless tobacco: Never   Vaping Use     Vaping Use: Never used   Substance and Sexual Activity     Alcohol use: Yes     Drug use: No     Sexual activity: Yes     Partners: Male     Birth control/protection: None, Post-menopausal, Female Surgical     Comment: [facility]   Social History Narrative    August 8, 2023        ENVIRONMENTAL HISTORY: [place] The home is heated with a forced air. They do have central air conditioning. The patient's bedroom is furnished with carpeting in bedroom.  Pets inside the house include 1 dog(s). There is no history of cockroach or mice infestation. There is/are 0 smokers in the house.  The house does not have a damp basement.               Current Outpatient Medications:      acyclovir (ZOVIRAX) 400 MG tablet, Take 1 tablet (400 mg) by mouth 2 times daily, Disp: 180 tablet, Rfl: 4     albuterol (PROAIR HFA/PROVENTIL HFA/VENTOLIN HFA) 108 (90 Base) MCG/ACT inhaler, Inhale 2 puffs into the lungs every 4 hours as needed for shortness of breath or wheezing, Disp: 18 g, Rfl: 2     albuterol (PROVENTIL) (2.5 MG/3ML) 0.083% neb solution, Take 1 vial (2.5 mg) by nebulization every 4 hours as needed for shortness of breath / dyspnea or wheezing, Disp: 120 mL, Rfl: 1     azelastine (ASTELIN) 0.1 % nasal spray, Spray 2 sprays into both nostrils 2 times daily as needed for rhinitis, Disp: 30 mL, Rfl: 3     budesonide-formoterol (SYMBICORT) 160-4.5 MCG/ACT Inhaler, Inhale 2 puffs into  the lungs 2 times daily, Disp: , Rfl:      budesonide-formoterol (SYMBICORT) 160-4.5 MCG/ACT Inhaler, Inhale 2 puffs into the lungs 2 times daily, Disp: 10.2 g, Rfl: 11     cetirizine (ZYRTEC) 10 MG tablet, Take 10 mg by mouth daily, Disp: , Rfl:      Cholecalciferol (VITAMIN D-3 PO), Take 1 tablet by mouth, Disp: , Rfl:      dexamethasone (DECADRON) 0.1 % ophthalmic solution, Place 1 drop into the right eye every other day, Disp: 5 mL, Rfl: 6     ELIQUIS ANTICOAGULANT 5 MG tablet, Take 1 tablet by mouth 2 times daily, Disp: , Rfl:      EPINEPHrine (AUVI-Q) 0.3 MG/0.3ML injection 2-pack, Inject 0.3 mLs (0.3 mg) into the muscle as needed for anaphylaxis, Disp: 2 each, Rfl: 1     erythromycin (ROMYCIN) 5 MG/GM ophthalmic ointment, Place into both eyes At Bedtime 1 application to each eye at bedtime, Disp: 3.5 g, Rfl: 11     estradiol (HARVEY) 0.025 MG/24HR bi-weekly patch, APPLY 1 PATCH TO THE SKIN TWICE A WEEK, Disp: 24 patch, Rfl: 3     fluconazole (DIFLUCAN) 100 MG tablet, 200 mg loading dose on day one, followed by 100mg by mouth daily for 7 days, Disp: 8 tablet, Rfl: 0     fluticasone (FLONASE) 50 MCG/ACT nasal spray, Spray 1-2 sprays into both nostrils daily, Disp: 16 g, Rfl: 4     glucagon 1 MG kit, Inject 1mg as needed for anaphylaxis that is non-responding to epinephrine., Disp: 1 mg, Rfl: 0     Hypromellose (ARTIFICIAL TEARS OP), Apply 1 drop to eye as needed Usually every 30 minutes., Disp: , Rfl:      metoprolol tartrate (LOPRESSOR) 25 MG tablet, , Disp: , Rfl:      montelukast (SINGULAIR) 10 MG tablet, Take 1 tablet (10 mg) by mouth at bedtime, Disp: 30 tablet, Rfl: 11     order for DME, Equipment being ordered: Nebulizer, Disp: 1 Box, Rfl: 0     triamterene-HCTZ (DYAZIDE) 37.5-25 MG capsule, Take 2 capsules by mouth daily As needed for fluid retention, Disp: 180 capsule, Rfl: 3     CLARITIN-D 24 HOUR  MG per 24 hr tablet, TK 1 T PO  D (Patient not taking: Reported on 8/8/2023), Disp: , Rfl:  "0  Immunization History   Administered Date(s) Administered     COVID-19 MONOVALENT 12+ (Pfizer) 03/05/2021, 03/26/2021, 11/08/2021     DTaP, Unspecified 09/05/2014     Pneumococcal 23 valent 10/16/2013     TD,PF 7+ (Tenivac) 04/01/2003     Allergies   Allergen Reactions     Latex Anaphylaxis     Xray Dye  [Contrast Dye] Hives     Pt states she had allergic reaction to xray day several years ago (hives) Pt has been successfully premedicated for contrast allergy without problem.      Avocado      Banana      Sulfa Antibiotics      Penicillins Rash     Other reaction(s): Swelling, lips/tongue  Childhood rash     OBJECTIVE:                                                                 /73   Pulse 61   Resp 16   Ht 1.626 m (5' 4\")   Wt 68.9 kg (152 lb)   SpO2 99%   BMI 26.09 kg/m          Physical Exam  Vitals and nursing note reviewed.   Constitutional:       General: She is not in acute distress.     Appearance: She is not ill-appearing, toxic-appearing or diaphoretic.   HENT:      Head: Normocephalic and atraumatic.      Right Ear: Tympanic membrane, ear canal and external ear normal.      Left Ear: Tympanic membrane, ear canal and external ear normal.      Nose: No mucosal edema, congestion or rhinorrhea.      Right Turbinates: Not enlarged, swollen or pale.      Left Turbinates: Not enlarged, swollen or pale.      Mouth/Throat:      Lips: Pink.      Mouth: Mucous membranes are moist.      Pharynx: Oropharynx is clear. No pharyngeal swelling, oropharyngeal exudate, posterior oropharyngeal erythema or uvula swelling.   Eyes:      General:         Right eye: No discharge.         Left eye: No discharge.      Conjunctiva/sclera: Conjunctivae normal.   Cardiovascular:      Rate and Rhythm: Normal rate and regular rhythm.      Heart sounds: Normal heart sounds. No murmur heard.  Pulmonary:      Effort: Pulmonary effort is normal. No respiratory distress.      Breath sounds: Normal breath sounds and air " entry. No stridor, decreased air movement or transmitted upper airway sounds. No decreased breath sounds, wheezing, rhonchi or rales.   Neurological:      Mental Status: She is alert and oriented to person, place, and time.   Psychiatric:         Mood and Affect: Mood normal.         Behavior: Behavior normal.           WORKUP: ACT 25    ASSESSMENT/PLAN:    Moderate persistent asthma without complication  She had an asthma exacerbation in December 2023 due to exposure to excessive chlorine at [employer] pool.  Required prednisone.  She thinks it was 1 prednisone within the past 12 months.  Overall, her symptoms are well-controlled with Symbicort 160/4.5 mcg 2 puffs twice daily, montelukast 10 mg by mouth once daily, and albuterol as needed.  - Continue as is.  - budesonide-formoterol (SYMBICORT) 160-4.5 MCG/ACT Inhaler  Dispense: 10.2 g; Refill: 11  - montelukast (SINGULAIR) 10 MG tablet  Dispense: 30 tablet; Refill: 11    Allergic rhinitis     Well-controlled with intranasal fluticasone 1-2 sprays in each nostril once daily and daily cetirizine.  - Continue as is.    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  -Will arrange venom testing when it will be possible for her to stop oral antihistamines.  She still needs to take cetirizine daily.    - EPINEPHrine (ANY BX GENERIC EQUIV) 0.3 MG/0.3ML injection 2-pack  Dispense: 2 each; Refill: 3        Follow up in 6 months or sooner if needed.     Thank you for allowing us to participate in the care of this patient. Please feel free to contact us if there are any questions or concerns about the patient.    Disclaimer: This note consists of symbols derived from keyboarding, dictation and/or voice recognition software. As a result, there may be errors in the script that have gone undetected. Please consider this when interpreting information found in this chart.    Arturo  MD Amanda, FAAAAI, FACANTHONYI  Allergy, Asthma and Immunology     [facility]        Again, thank you for allowing me to participate in the care of your patient.        Sincerely,        Arturo Patel MD

## 2024-02-13 NOTE — PROGRESS NOTES
SUBJECTIVE:                                                                   Heather Wilkinson presents today to our Allergy Clinic at [facility] for a follow up visit. She is a 68 year old female with history of asthma, food allergy, and allergic rhinitis. .  Problem   Moderate Persistent Asthma Without Complication    History of moderate persistent asthma for years. Was hospitalized as a child. Stayed in ICU but no history of intubations for asthma flares. She smoked 1/2ppd for 20 years. Stopped in her 50's Has a history of recurrent thrush with ICS-containing medications.  In 2020, serum IgE was 330 KiU/L (0-114).  CBC with differential was within normal limits. Absolute eosinophil count was 300. Spirometry in March 2020 suggested mild obstruction without postbronchodilator reversibility.  In March 2023, the office spirometry suggested mild to moderate obstruction with postbronchodilator response in FVC.     Food Allergy    Had IgE mediated symptoms with avocado, banana, and latex, manifested by throat closing, lip angioedema, and hives. Required epi in the past. In the past, positive serum IgE for avocado and latex.     Seasonal Allergic Rhinitis Due to Pollen    History of allergic rhinoconjunctivitis symptoms. Was on allergen immunotherapy in the past, many years ago. Stopped immunotherapy secondary to systemic reactions.  In 2020, serum IgE for regional aeroallergen panel showed sensitivity to molds, cat, dog, grass pollen, and weed pollen.     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  In 2020, serum IgE for venom was negative.          Emma reports that her current asthma symptoms are well controlled with Symbicort 160/4.5 mcg 2 puffs twice daily, montelukast 10 mg by mouth once daily at bedtime, and albuterol as needed. She uses albuterol HFA less than  twice weekly for rescue from chest symptoms. One month ago, was exposed to a lot of chlorine at the [employer] pool. Developed cough and shortness of breath. Had to be seen in UC and was prescribed prednisone.  Besides that episode, her asthma symptoms have been well-controlled. Emma denies current chest tightness, cough, wheezing, or shortness of breath.    Regarding allergic rhinoconjunctivitis, she uses intranasal fluticasone 1-2 sprays in each nostril once daily and takes cetirizine 10 mg by mouth once daily. She uses azelastine as needed.  In January, she had a sinus infection that was treated with doxycycline. States that doxycycline cleared the infection a couple of days after she started taking it.      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    Allergic rhinitis due to mold    Allergic rhinitis due to animal dander    Thrush    Latex allergy    H/O: hysterectomy       Past Medical History:   Diagnosis Date    Allergic rhinitis     Asthma     Atrial fibrillation (H)     Cough     Fluid retention     Tachycardia       Problem (# of Occurrences) Relation (Name,Age of Onset)    No Known Problems (9) Mother, Father, Sister, Brother, Maternal Grandmother, Maternal Grandfather, Paternal Grandmother, Paternal Grandfather, Other           Negative family history of: Glaucoma, Macular Degeneration          Past Surgical History:   Procedure Laterality Date    CYSTOSCOPY  5/11/2012    Procedure:CYSTOSCOPY; Surgeon:RADHA COMER; Location:[facility]    GYN SURGERY      csection, infertility, hyst    HYSTERECTOMY  1992    HYSTERECTOMY, PAP NO LONGER INDICATED      SLING TRANSVAGINAL  5/11/2012    Procedure:SLING TRANSVAGINAL; mini arc sling  and cystoscopy; Surgeon:RADHA COMER; Location:[facility]     Social History     Socioeconomic History    Marital status:       Spouse name: None    Number of children: 4    Years of education: None    Highest education level: None   Occupational History     Comment: retired   Tobacco Use    Smoking status: Former    Smokeless tobacco: Never   Vaping Use    Vaping Use: Never used   Substance and Sexual Activity    Alcohol use: Yes    Drug use: No    Sexual activity: Yes     Partners: Male     Birth control/protection: None, Post-menopausal, Female Surgical     Comment: [facility]   Social History Narrative    August 8, 2023        ENVIRONMENTAL HISTORY: [place] The home is heated with a forced air. They do have central air conditioning. The patient's bedroom is furnished with carpeting in bedroom.  Pets inside the house include 1 dog(s). There is no history of cockroach or mice infestation. There is/are 0 smokers in the house.  The house does not have a damp basement.               Current Outpatient Medications:     acyclovir (ZOVIRAX) 400 MG tablet, Take 1 tablet (400 mg) by mouth 2 times daily, Disp: 180 tablet, Rfl: 4    albuterol (PROAIR HFA/PROVENTIL HFA/VENTOLIN HFA) 108 (90 Base) MCG/ACT inhaler, Inhale 2 puffs into the lungs every 4 hours as needed for shortness of breath or wheezing, Disp: 18 g, Rfl: 2    albuterol (PROVENTIL) (2.5 MG/3ML) 0.083% neb solution, Take 1 vial (2.5 mg) by nebulization every 4 hours as needed for shortness of breath / dyspnea or wheezing, Disp: 120 mL, Rfl: 1    azelastine (ASTELIN) 0.1 % nasal spray, Spray 2 sprays into both nostrils 2 times daily as needed for rhinitis, Disp: 30 mL, Rfl: 3    budesonide-formoterol (SYMBICORT) 160-4.5 MCG/ACT Inhaler, Inhale 2 puffs into the lungs 2 times daily, Disp: , Rfl:     budesonide-formoterol (SYMBICORT) 160-4.5 MCG/ACT Inhaler, Inhale 2 puffs into the lungs 2 times daily, Disp: 10.2 g, Rfl: 11    cetirizine (ZYRTEC) 10 MG tablet, Take 10 mg by mouth daily, Disp: , Rfl:     Cholecalciferol (VITAMIN D-3 PO), Take 1  tablet by mouth, Disp: , Rfl:     dexamethasone (DECADRON) 0.1 % ophthalmic solution, Place 1 drop into the right eye every other day, Disp: 5 mL, Rfl: 6    ELIQUIS ANTICOAGULANT 5 MG tablet, Take 1 tablet by mouth 2 times daily, Disp: , Rfl:     EPINEPHrine (AUVI-Q) 0.3 MG/0.3ML injection 2-pack, Inject 0.3 mLs (0.3 mg) into the muscle as needed for anaphylaxis, Disp: 2 each, Rfl: 1    erythromycin (ROMYCIN) 5 MG/GM ophthalmic ointment, Place into both eyes At Bedtime 1 application to each eye at bedtime, Disp: 3.5 g, Rfl: 11    estradiol (HARVEY) 0.025 MG/24HR bi-weekly patch, APPLY 1 PATCH TO THE SKIN TWICE A WEEK, Disp: 24 patch, Rfl: 3    fluconazole (DIFLUCAN) 100 MG tablet, 200 mg loading dose on day one, followed by 100mg by mouth daily for 7 days, Disp: 8 tablet, Rfl: 0    fluticasone (FLONASE) 50 MCG/ACT nasal spray, Spray 1-2 sprays into both nostrils daily, Disp: 16 g, Rfl: 4    glucagon 1 MG kit, Inject 1mg as needed for anaphylaxis that is non-responding to epinephrine., Disp: 1 mg, Rfl: 0    Hypromellose (ARTIFICIAL TEARS OP), Apply 1 drop to eye as needed Usually every 30 minutes., Disp: , Rfl:     metoprolol tartrate (LOPRESSOR) 25 MG tablet, , Disp: , Rfl:     montelukast (SINGULAIR) 10 MG tablet, Take 1 tablet (10 mg) by mouth at bedtime, Disp: 30 tablet, Rfl: 11    order for DME, Equipment being ordered: Nebulizer, Disp: 1 Box, Rfl: 0    triamterene-HCTZ (DYAZIDE) 37.5-25 MG capsule, Take 2 capsules by mouth daily As needed for fluid retention, Disp: 180 capsule, Rfl: 3    CLARITIN-D 24 HOUR  MG per 24 hr tablet, TK 1 T PO  D (Patient not taking: Reported on 8/8/2023), Disp: , Rfl: 0  Immunization History   Administered Date(s) Administered    COVID-19 MONOVALENT 12+ (Pfizer) 03/05/2021, 03/26/2021, 11/08/2021    DTaP, Unspecified 09/05/2014    Pneumococcal 23 valent 10/16/2013    TD,PF 7+ (Tenivac) 04/01/2003     Allergies   Allergen Reactions    Latex Anaphylaxis    Xray Dye  [Contrast  "Dye] Hives     Pt states she had allergic reaction to xray day several years ago (hives) Pt has been successfully premedicated for contrast allergy without problem.     Avocado     Banana     Sulfa Antibiotics     Penicillins Rash     Other reaction(s): Swelling, lips/tongue  Childhood rash     OBJECTIVE:                                                                 /73   Pulse 61   Resp 16   Ht 1.626 m (5' 4\")   Wt 68.9 kg (152 lb)   SpO2 99%   BMI 26.09 kg/m          Physical Exam  Vitals and nursing note reviewed.   Constitutional:       General: She is not in acute distress.     Appearance: She is not ill-appearing, toxic-appearing or diaphoretic.   HENT:      Head: Normocephalic and atraumatic.      Right Ear: Tympanic membrane, ear canal and external ear normal.      Left Ear: Tympanic membrane, ear canal and external ear normal.      Nose: No mucosal edema, congestion or rhinorrhea.      Right Turbinates: Not enlarged, swollen or pale.      Left Turbinates: Not enlarged, swollen or pale.      Mouth/Throat:      Lips: Pink.      Mouth: Mucous membranes are moist.      Pharynx: Oropharynx is clear. No pharyngeal swelling, oropharyngeal exudate, posterior oropharyngeal erythema or uvula swelling.   Eyes:      General:         Right eye: No discharge.         Left eye: No discharge.      Conjunctiva/sclera: Conjunctivae normal.   Cardiovascular:      Rate and Rhythm: Normal rate and regular rhythm.      Heart sounds: Normal heart sounds. No murmur heard.  Pulmonary:      Effort: Pulmonary effort is normal. No respiratory distress.      Breath sounds: Normal breath sounds and air entry. No stridor, decreased air movement or transmitted upper airway sounds. No decreased breath sounds, wheezing, rhonchi or rales.   Neurological:      Mental Status: She is alert and oriented to person, place, and time.   Psychiatric:         Mood and Affect: Mood normal.         Behavior: Behavior normal. "           WORKUP: ACT 25    ASSESSMENT/PLAN:    Moderate persistent asthma without complication  She had an asthma exacerbation in December 2023 due to exposure to excessive chlorine at [facility].  Required prednisone.  She thinks it was 1 prednisone within the past 12 months.  Overall, her symptoms are well-controlled with Symbicort 160/4.5 mcg 2 puffs twice daily, montelukast 10 mg by mouth once daily, and albuterol as needed.  - Continue as is.  - budesonide-formoterol (SYMBICORT) 160-4.5 MCG/ACT Inhaler  Dispense: 10.2 g; Refill: 11  - montelukast (SINGULAIR) 10 MG tablet  Dispense: 30 tablet; Refill: 11    Allergic rhinitis     Well-controlled with intranasal fluticasone 1-2 sprays in each nostril once daily and daily cetirizine.  - Continue as is.    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  -Will arrange venom testing when it will be possible for her to stop oral antihistamines.  She still needs to take cetirizine daily.    - EPINEPHrine (ANY BX GENERIC EQUIV) 0.3 MG/0.3ML injection 2-pack  Dispense: 2 each; Refill: 3        Follow up in 6 months or sooner if needed.     Thank you for allowing us to participate in the care of this patient. Please feel free to contact us if there are any questions or concerns about the patient.    Disclaimer: This note consists of symbols derived from keyboarding, dictation and/or voice recognition software. As a result, there may be errors in the script that have gone undetected. Please consider this when interpreting information found in this chart.    Arturo Patel MD, FAAAAI, FACAAI  Allergy, Asthma and Immunology     [facility]

## 2024-03-21 ENCOUNTER — TELEPHONE (OUTPATIENT)
Dept: OBGYN | Facility: CLINIC | Age: 69
End: 2024-03-21
Payer: COMMERCIAL

## 2024-03-21 DIAGNOSIS — N95.1 SYMPTOMATIC MENOPAUSAL OR FEMALE CLIMACTERIC STATES: ICD-10-CM

## 2024-03-21 RX ORDER — ESTRADIOL 0.03 MG/D
FILM, EXTENDED RELEASE TRANSDERMAL
Qty: 24 PATCH | Refills: 0 | Status: SHIPPED | OUTPATIENT
Start: 2024-03-21 | End: 2024-06-13

## 2024-03-21 NOTE — TELEPHONE ENCOUNTER
Requested Prescriptions   Pending Prescriptions Disp Refills    estradiol (HARVEY) 0.025 MG/24HR bi-weekly patch 24 patch 3     Sig: APPLY 1 PATCH TO THE SKIN TWICE A WEEK       There is no refill protocol information for this order        Last Written Prescription Date:  4/12/23  Last Fill Quantity: 24,  # refills: 3   Last office visit: 4/10/2023 ; last virtual visit: Visit date not found with prescribing provider:  Iris   Future Office Visit:      Annual 5/6/24    Medication is being filled for 1 time refill only due to:  Patient needs to be seen because it has been more than one year since last visit.  oJnathon King RN on 3/21/2024 at 4:03 PM

## 2024-03-21 NOTE — TELEPHONE ENCOUNTER
[facility] Call Center    Phone Message    May a detailed message be left on voicemail: yes     Reason for Call: Medication Refill Request    Has the patient contacted the pharmacy for the refill? Yes   Name of medication being requested: estradiol (HARVEY) 0.025 MG/24HR bi-weekly patch    Provider who prescribed the medication: Nydia Simmons  Pharmacy: [id number] [address]  Date medication is needed: asap       Action Taken: Other: [facility]    Travel Screening: Not Applicable

## 2024-04-12 ENCOUNTER — MYC REFILL (OUTPATIENT)
Dept: OPHTHALMOLOGY | Facility: CLINIC | Age: 69
End: 2024-04-12
Payer: COMMERCIAL

## 2024-04-12 DIAGNOSIS — H16.9 KERATITIS: ICD-10-CM

## 2024-04-12 RX ORDER — ERYTHROMYCIN 5 MG/G
OINTMENT OPHTHALMIC AT BEDTIME
Qty: 3.5 G | Refills: 11 | Status: SHIPPED | OUTPATIENT
Start: 2024-04-12

## 2024-05-03 ENCOUNTER — TELEPHONE (OUTPATIENT)
Dept: OPHTHALMOLOGY | Facility: CLINIC | Age: 69
End: 2024-05-03
Payer: COMMERCIAL

## 2024-05-08 ENCOUNTER — TELEPHONE (OUTPATIENT)
Dept: OPHTHALMOLOGY | Facility: CLINIC | Age: 69
End: 2024-05-08
Payer: COMMERCIAL

## 2024-06-13 ENCOUNTER — MYC REFILL (OUTPATIENT)
Dept: OBGYN | Facility: CLINIC | Age: 69
End: 2024-06-13
Payer: COMMERCIAL

## 2024-06-13 DIAGNOSIS — R60.9 EDEMA, UNSPECIFIED TYPE: ICD-10-CM

## 2024-06-13 DIAGNOSIS — N95.1 SYMPTOMATIC MENOPAUSAL OR FEMALE CLIMACTERIC STATES: ICD-10-CM

## 2024-06-14 RX ORDER — TRIAMTERENE AND HYDROCHLOROTHIAZIDE 37.5; 25 MG/1; MG/1
2 CAPSULE ORAL DAILY
Qty: 180 CAPSULE | Refills: 3 | OUTPATIENT
Start: 2024-06-14

## 2024-06-14 RX ORDER — ESTRADIOL 0.03 MG/D
FILM, EXTENDED RELEASE TRANSDERMAL
Qty: 24 PATCH | Refills: 0 | Status: SHIPPED | OUTPATIENT
Start: 2024-06-14 | End: 2024-08-07

## 2024-06-14 NOTE — TELEPHONE ENCOUNTER
Requested Prescriptions   Pending Prescriptions Disp Refills    estradiol (HARVEY) 0.025 MG/24HR bi-weekly patch 24 patch 0     Sig: APPLY 1 PATCH TO THE SKIN TWICE A WEEK       Contraceptives Protocol Failed - 6/13/2024  8:45 PM        Failed - Recent (12 mo) or future (30 days) visit within the authorizing provider's specialty     The patient must have completed an in-person or virtual visit within the past 12 months or has a future visit scheduled within the next 90 days with the authorizing provider s specialty.  Urgent care and e-visits do not quality as an office visit for this protocol.          Failed - Medication indicated for associated diagnosis     Medication is associated with one or more of the following diagnoses:  Contraception  Acne  Dysmenorrhea  Menorrhagia  Amenorrhea  PCOS  Premenstrual Dysphoric Disorder          Passed - Patient is not a current smoker if age is 35 or older        Passed - Medication is active on med list        Passed - No active pregnancy on record        Passed - No positive pregnancy test in past 12 months       Hormone Replacement Therapy Failed - 6/13/2024  8:45 PM        Failed - Medication indicated for associated diagnosis     The medication is prescribed for one or more of the following conditions:    Menopause   Vulva/Vaginal atrophy   Low Estrogen   Gender Dysphoria   Male to Female transgender          Passed - Blood pressure under 140/90 in past 12 months     BP Readings from Last 3 Encounters:   02/13/24 126/73   08/08/23 119/71   06/06/23 109/70       No data recorded            Passed - Medication is active on med list        Passed - Recent (12 mo) or future (90 days) visit within the authorizing provider's specialty     The patient must have completed an in-person or virtual visit within the past 12 months or has a future visit scheduled within the next 90 days with the authorizing provider s specialty.  Urgent care and e-visits do not quality as an office  visit for this protocol.          Passed - Patient is 18 years of age or older        Passed - No active pregnancy on record        Passed - No positive pregnancy test on record in past 12 months       Hormone Replacement Therapy (vaginal) Failed - 6/13/2024  8:45 PM        Failed - Recent (12 mo) or future (90 days) visit within the authorizing provider's specialty     The patient must have completed an in-person or virtual visit within the past 12 months or has a future visit scheduled within the next 90 days with the authorizing provider s specialty.  Urgent care and e-visits do not quality as an office visit for this protocol.          Passed - Medication is active on med list        Passed - Medication indicated for associated diagnosis     Medication is associated with one or more of the following diagnoses:     Menopause   Vulva/Vaginal atrophy   UTI prophylaxis          Passed - Patient is 18 years of age or older        Passed - No active pregnancy on record        Passed - No positive pregnancy test on record in past 12 months           Last Written Prescription Date:  3/21/24  Last Fill Quantity: 24,  # refills: 0   Last office visit: 4/10/2023 ; last virtual visit: Visit date not found with prescribing provider:  Charanjit   Future Office Visit:  8/7/24 with Dr. Donohue    Medication is being filled for 1 time refill only due to:  Patient needs to be seen because it has been more than one year since last visit.  Appointment scheduled.  Melva Loya RN on 6/14/2024 at 5:28 AM

## 2024-07-06 ENCOUNTER — HEALTH MAINTENANCE LETTER (OUTPATIENT)
Age: 69
End: 2024-07-06

## 2024-08-06 NOTE — PROGRESS NOTES
SUBJECTIVE:                                                   Heather Wilkinson is a 68 year old female who presents to clinic today for the following health issue(s):  Patient presents with:  Breast and Pelvic Exam    HPI:  Patient here for breast and pelvic exam  She has had a hysterectomy, so no longer requires pap smear  She has been on HRT due to symptomatic hot flashes. She has tried to stop in the past, but was unsuccessful.  She understands risks associated with HRT usage, but feel quality of life is effected without it, so would like to continue    Additionally she is asking for Dyazide to be refilled. States she is using this for edema. Was started by Dr. Hassan.     No LMP recorded. Patient is postmenopausal..     Patient is sexually active, .  Using none and menopause for contraception.   STD testing offered?  Declined   reports that she has quit smoking. She has never used smokeless tobacco.    Health maintenance updated:  yes    Today's PHQ-2 Score:       2024     2:11 PM   PHQ-2 (  [employer])   Q1: Little interest or pleasure in doing things 0   Q2: Feeling down, depressed or hopeless 0   PHQ-2 Score 0     Today's PHQ-9 Score:       2024     2:11 PM   PHQ-9 SCORE   PHQ-9 Total Score 0     Today's ERICA-7 Score:       2024     2:11 PM   ERICA-7 SCORE   Total Score 0       Problem list and histories reviewed & adjusted, as indicated.  Additional history: as documented.    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    Allergic rhinitis due to mold    Allergic rhinitis due to animal dander    Thrush    Latex allergy    H/O: hysterectomy     Past Surgical History:   Procedure Laterality Date    CYSTOSCOPY  2012    Procedure:CYSTOSCOPY; Surgeon:RADHA HASSAN; Location:[facility]    FOOT SURGERY Right     GYN SURGERY       csection, infertility, hyst    HYSTERECTOMY  1992    HYSTERECTOMY, PAP NO LONGER INDICATED      SLING TRANSVAGINAL  05/11/2012    Procedure:SLING TRANSVAGINAL; mini arc sling  and cystoscopy; Surgeon:RADHA COMER; Location:[facility]      Social History     Tobacco Use    Smoking status: Former    Smokeless tobacco: Never   Substance Use Topics    Alcohol use: Yes      Problem (# of Occurrences) Relation (Name,Age of Onset)    No Known Problems (9) Mother, Father, Sister, Brother, Maternal Grandmother, Maternal Grandfather, Paternal Grandmother, Paternal Grandfather, Other           Negative family history of: Glaucoma, Macular Degeneration              Current Outpatient Medications   Medication Sig Dispense Refill    acyclovir (ZOVIRAX) 400 MG tablet Take 1 tablet (400 mg) by mouth 2 times daily 60 tablet 5    albuterol (PROAIR HFA/PROVENTIL HFA/VENTOLIN HFA) 108 (90 Base) MCG/ACT inhaler Inhale 2 puffs into the lungs every 4 hours as needed for shortness of breath or wheezing 18 g 2    albuterol (PROVENTIL) (2.5 MG/3ML) 0.083% neb solution Take 1 vial (2.5 mg) by nebulization every 4 hours as needed for shortness of breath / dyspnea or wheezing 120 mL 1    azelastine (ASTELIN) 0.1 % nasal spray Spray 2 sprays into both nostrils 2 times daily as needed for rhinitis 30 mL 3    budesonide-formoterol (SYMBICORT) 160-4.5 MCG/ACT Inhaler Inhale 2 puffs into the lungs 2 times daily      budesonide-formoterol (SYMBICORT) 160-4.5 MCG/ACT Inhaler Inhale 2 puffs into the lungs 2 times daily 10.2 g 11    Cholecalciferol (VITAMIN D-3 PO) Take 1 tablet by mouth      CLARITIN-D 24 HOUR  MG per 24 hr tablet   0    ELIQUIS ANTICOAGULANT 5 MG tablet Take 1 tablet by mouth 2 times daily      EPINEPHrine (ANY BX GENERIC EQUIV) 0.3 MG/0.3ML injection 2-pack Inject 0.3 mLs (0.3 mg) into the muscle as needed for anaphylaxis May repeat one time in 5-15 minutes if response to initial dose is inadequate. 2 each 3    erythromycin  "(ROMYCIN) 5 MG/GM ophthalmic ointment Place into both eyes at bedtime 1 application to each eye at bedtime 3.5 g 11    estradiol (HARVEY) 0.025 MG/24HR bi-weekly patch APPLY 1 PATCH TO THE SKIN TWICE A WEEK 24 patch 3    fluticasone (FLONASE) 50 MCG/ACT nasal spray Spray 1-2 sprays into both nostrils daily 16 g 4    glucagon 1 MG kit Inject 1mg as needed for anaphylaxis that is non-responding to epinephrine. 1 mg 0    Hypromellose (ARTIFICIAL TEARS OP) Apply 1 drop to eye as needed Usually every 30 minutes.      metoprolol tartrate (LOPRESSOR) 25 MG tablet       order for DME Equipment being ordered: Nebulizer 1 Box 0    triamterene-HCTZ (DYAZIDE) 37.5-25 MG capsule Take 2 capsules by mouth daily As needed for fluid retention 180 capsule 0    cetirizine (ZYRTEC) 10 MG tablet Take 10 mg by mouth daily (Patient not taking: Reported on 8/7/2024)      dexamethasone (DECADRON) 0.1 % ophthalmic solution Place 1 drop into the right eye every other day (Patient not taking: Reported on 8/7/2024) 5 mL 6    fluconazole (DIFLUCAN) 100 MG tablet 200 mg loading dose on day one, followed by 100mg by mouth daily for 7 days (Patient not taking: Reported on 8/7/2024) 8 tablet 0    montelukast (SINGULAIR) 10 MG tablet Take 1 tablet (10 mg) by mouth at bedtime (Patient not taking: Reported on 8/7/2024) 30 tablet 11     No current facility-administered medications for this visit.     Allergies   Allergen Reactions    Latex Anaphylaxis    Xray Dye  [Contrast Dye] Hives     Pt states she had allergic reaction to xray day several years ago (hives) Pt has been successfully premedicated for contrast allergy without problem.     Avocado     Banana     Sulfa Antibiotics     Penicillins Rash     Other reaction(s): Swelling, lips/tongue  Childhood rash           OBJECTIVE:     /62   Ht 1.626 m (5' 4\")   Wt 68.6 kg (151 lb 3.2 oz)   BMI 25.95 kg/m    Body mass index is 25.95 kg/m .    Exam:  Constitutional:  Appearance: Well nourished, " well developed alert, in no acute distress  Neck:  Lymph Nodes:  No lymphadenopathy present; Thyroid:  Gland size normal, nontender, no nodules or masses present on palpation  Breasts:  Inspection of Breasts:  Symmetric bilaterally.  No puckering.  No skin changes.  Palpation of Breasts and Axillae:  No masses present on palpation, no breast tenderness Axillary Lymph Nodes:  No lymphadenopathy present  Gastrointestinal:  Abdominal Examination:  Abdomen nontender to palpation, tone normal without rigidity or guarding, no masses present, umbilicus without lesions; Liver/Spleen:  No hepatomegaly present, liver nontender to palpation; Hernias:  No hernias present  Psychiatric:  Mentation appears normal and affect normal/bright.  Pelvic Exam:  External Genitalia:     Normal appearance for age, no discharge present, no tenderness present, no inflammatory lesions present, color normal  Vagina:     Normal vaginal vault without central or paravaginal defects, no discharge present, no inflammatory lesions present, no masses present  Bladder:     Nontender to palpation  Urethra:   Urethral Body:  Urethra palpation normal, urethra structural support normal   Urethral Meatus:  No erythema or lesions present  Cervix:     Surgically absent  Uterus:     Surgically absent  Adnexa:     No adnexal masses or tenderness  Perineum:     Perineum within normal limits, no evidence of trauma, no rashes or skin lesions present  Anus:     Anus within normal limits, no hemorrhoids present  Inguinal Lymph Nodes:     No lymphadenopathy present     In-Clinic Test Results:  No results found for this or any previous visit (from the past 24 hour(s)).    ASSESSMENT/PLAN:                                                        ICD-10-CM    1. Encounter for breast and pelvic examination  Z01.419       2. Edema, unspecified type  R60.9 triamterene-HCTZ (DYAZIDE) 37.5-25 MG capsule      3. Symptomatic menopausal or female climacteric states  N95.1  estradiol (HARVEY) 0.025 MG/24HR bi-weekly patch        Normal exam today  Refill HRT x 1 year. Reviewed risks/benefits  1 time refill of dyazide. Would like patients PCP or cardiologist to take over management in the future given indication for usage.  Follow-up in 1 year or sooner prn.    Joy Donohue MD  [facility] [place] [place]

## 2024-08-07 ENCOUNTER — ANCILLARY PROCEDURE (OUTPATIENT)
Dept: MAMMOGRAPHY | Facility: CLINIC | Age: 69
End: 2024-08-07
Attending: OBSTETRICS & GYNECOLOGY
Payer: COMMERCIAL

## 2024-08-07 ENCOUNTER — OFFICE VISIT (OUTPATIENT)
Dept: OBGYN | Facility: CLINIC | Age: 69
End: 2024-08-07
Attending: OBSTETRICS & GYNECOLOGY
Payer: COMMERCIAL

## 2024-08-07 VITALS
SYSTOLIC BLOOD PRESSURE: 118 MMHG | BODY MASS INDEX: 25.81 KG/M2 | HEIGHT: 64 IN | DIASTOLIC BLOOD PRESSURE: 62 MMHG | WEIGHT: 151.2 LBS

## 2024-08-07 DIAGNOSIS — Z01.419 ENCOUNTER FOR BREAST AND PELVIC EXAMINATION: Primary | ICD-10-CM

## 2024-08-07 DIAGNOSIS — Z12.31 VISIT FOR SCREENING MAMMOGRAM: ICD-10-CM

## 2024-08-07 DIAGNOSIS — N95.1 SYMPTOMATIC MENOPAUSAL OR FEMALE CLIMACTERIC STATES: ICD-10-CM

## 2024-08-07 DIAGNOSIS — R60.9 EDEMA, UNSPECIFIED TYPE: ICD-10-CM

## 2024-08-07 PROCEDURE — 99214 OFFICE O/P EST MOD 30 MIN: CPT | Mod: 25 | Performed by: OBSTETRICS & GYNECOLOGY

## 2024-08-07 PROCEDURE — G0101 CA SCREEN;PELVIC/BREAST EXAM: HCPCS | Performed by: OBSTETRICS & GYNECOLOGY

## 2024-08-07 PROCEDURE — 77067 SCR MAMMO BI INCL CAD: CPT | Mod: TC | Performed by: RADIOLOGY

## 2024-08-07 PROCEDURE — 77063 BREAST TOMOSYNTHESIS BI: CPT | Mod: TC | Performed by: RADIOLOGY

## 2024-08-07 PROCEDURE — 99459 PELVIC EXAMINATION: CPT | Performed by: OBSTETRICS & GYNECOLOGY

## 2024-08-07 RX ORDER — ESTRADIOL 0.03 MG/D
FILM, EXTENDED RELEASE TRANSDERMAL
Qty: 24 PATCH | Refills: 3 | Status: SHIPPED | OUTPATIENT
Start: 2024-08-07

## 2024-08-07 RX ORDER — TRIAMTERENE AND HYDROCHLOROTHIAZIDE 37.5; 25 MG/1; MG/1
2 CAPSULE ORAL DAILY
Qty: 180 CAPSULE | Refills: 0 | Status: SHIPPED | OUTPATIENT
Start: 2024-08-07

## 2024-08-07 ASSESSMENT — ANXIETY QUESTIONNAIRES
3. WORRYING TOO MUCH ABOUT DIFFERENT THINGS: NOT AT ALL
7. FEELING AFRAID AS IF SOMETHING AWFUL MIGHT HAPPEN: NOT AT ALL
GAD7 TOTAL SCORE: 0
5. BEING SO RESTLESS THAT IT IS HARD TO SIT STILL: NOT AT ALL
IF YOU CHECKED OFF ANY PROBLEMS ON THIS QUESTIONNAIRE, HOW DIFFICULT HAVE THESE PROBLEMS MADE IT FOR YOU TO DO YOUR WORK, TAKE CARE OF THINGS AT HOME, OR GET ALONG WITH OTHER PEOPLE: NOT DIFFICULT AT ALL
GAD7 TOTAL SCORE: 0
6. BECOMING EASILY ANNOYED OR IRRITABLE: NOT AT ALL
1. FEELING NERVOUS, ANXIOUS, OR ON EDGE: NOT AT ALL
2. NOT BEING ABLE TO STOP OR CONTROL WORRYING: NOT AT ALL

## 2024-08-07 ASSESSMENT — PATIENT HEALTH QUESTIONNAIRE - PHQ9
5. POOR APPETITE OR OVEREATING: NOT AT ALL
SUM OF ALL RESPONSES TO PHQ QUESTIONS 1-9: 0

## 2024-08-07 ASSESSMENT — LIFESTYLE VARIABLES
HOW OFTEN DO YOU HAVE A DRINK CONTAINING ALCOHOL: 2-3 TIMES A WEEK
HOW MANY STANDARD DRINKS CONTAINING ALCOHOL DO YOU HAVE ON A TYPICAL DAY: 1 OR 2
HOW OFTEN DO YOU HAVE SIX OR MORE DRINKS ON ONE OCCASION: NEVER
AUDIT-C TOTAL SCORE: 3
SKIP TO QUESTIONS 9-10: 1

## 2024-10-15 ENCOUNTER — MYC REFILL (OUTPATIENT)
Dept: ALLERGY | Facility: OTHER | Age: 69
End: 2024-10-15
Payer: COMMERCIAL

## 2024-10-15 DIAGNOSIS — J45.40 MODERATE PERSISTENT ASTHMA WITHOUT COMPLICATION: ICD-10-CM

## 2024-10-15 RX ORDER — ALBUTEROL SULFATE 90 UG/1
2 INHALANT RESPIRATORY (INHALATION) EVERY 4 HOURS PRN
Qty: 18 G | Refills: 0 | Status: SHIPPED | OUTPATIENT
Start: 2024-10-15

## 2024-10-15 NOTE — TELEPHONE ENCOUNTER
Pending Prescriptions:                       Disp   Refills    albuterol (PROAIR HFA/PROVENTIL HFA/[place]*18 g   0            Sig: Inhale 2 puffs into the lungs every 4 hours as           needed for shortness of breath or wheezing.    Routing refill request to provider for review/approval because:  Patient needs to be seen because:  LOV > 6 mos ago.  Last seen 2/13/24, no upcoming appt.    Kerry Condon MSN, RN   [facility], 10/15/2024 2:09 PM

## 2024-11-06 NOTE — ASSESSMENT & PLAN NOTE
History of allergic rhinoconjunctivitis.  Has been on allergen immunotherapy in the past.  Not recently.  Stopped immunotherapy secondary to systemic reactions.  Claritin-D is used as needed for nasal symptoms.  Significantly beneficial.     Allergy testing positive for aspergillus, cat, dog, molds, grass, ragweed.      -Claritin-D as needed. Well controlled.    How Severe Is Your Skin Discoloration?: moderate

## 2025-07-13 ENCOUNTER — HEALTH MAINTENANCE LETTER (OUTPATIENT)
Age: 70
End: 2025-07-13

## 2025-08-07 ENCOUNTER — OFFICE VISIT (OUTPATIENT)
Dept: OBGYN | Facility: OTHER | Age: 70
End: 2025-08-07
Payer: COMMERCIAL

## 2025-08-07 VITALS
BODY MASS INDEX: 25.04 KG/M2 | DIASTOLIC BLOOD PRESSURE: 68 MMHG | WEIGHT: 146.7 LBS | HEIGHT: 64 IN | SYSTOLIC BLOOD PRESSURE: 103 MMHG

## 2025-08-07 DIAGNOSIS — N95.1 SYMPTOMATIC MENOPAUSAL OR FEMALE CLIMACTERIC STATES: ICD-10-CM

## 2025-08-07 RX ORDER — ESTRADIOL 0.03 MG/D
FILM, EXTENDED RELEASE TRANSDERMAL
Qty: 24 PATCH | Refills: 3 | Status: SHIPPED | OUTPATIENT
Start: 2025-08-07

## 2025-08-11 ENCOUNTER — ANCILLARY PROCEDURE (OUTPATIENT)
Dept: MAMMOGRAPHY | Facility: CLINIC | Age: 70
End: 2025-08-11
Attending: OBSTETRICS & GYNECOLOGY
Payer: COMMERCIAL

## 2025-08-11 DIAGNOSIS — Z12.31 VISIT FOR SCREENING MAMMOGRAM: ICD-10-CM

## 2025-08-11 PROCEDURE — 77067 SCR MAMMO BI INCL CAD: CPT | Mod: GC | Performed by: RADIOLOGY

## 2025-08-11 PROCEDURE — 77063 BREAST TOMOSYNTHESIS BI: CPT | Mod: GC | Performed by: RADIOLOGY
